# Patient Record
Sex: MALE | NOT HISPANIC OR LATINO | Employment: OTHER | URBAN - NONMETROPOLITAN AREA
[De-identification: names, ages, dates, MRNs, and addresses within clinical notes are randomized per-mention and may not be internally consistent; named-entity substitution may affect disease eponyms.]

---

## 2022-05-15 ENCOUNTER — HOSPITAL ENCOUNTER (EMERGENCY)
Facility: HOSPITAL | Age: 62
Discharge: HOME | End: 2022-05-15
Attending: EMERGENCY MEDICINE | Admitting: EMERGENCY MEDICINE
Payer: COMMERCIAL

## 2022-05-15 ENCOUNTER — APPOINTMENT (EMERGENCY)
Dept: RADIOLOGY | Facility: HOSPITAL | Age: 62
End: 2022-05-15
Payer: COMMERCIAL

## 2022-05-15 VITALS
HEIGHT: 70 IN | BODY MASS INDEX: 26.48 KG/M2 | WEIGHT: 185 LBS | SYSTOLIC BLOOD PRESSURE: 144 MMHG | DIASTOLIC BLOOD PRESSURE: 81 MMHG | HEART RATE: 57 BPM | OXYGEN SATURATION: 97 % | TEMPERATURE: 97.3 F | RESPIRATION RATE: 18 BRPM

## 2022-05-15 DIAGNOSIS — S91.332A PUNCTURE WOUND OF LEFT FOOT, INITIAL ENCOUNTER: Primary | ICD-10-CM

## 2022-05-15 PROCEDURE — 73630 X-RAY EXAM OF FOOT: CPT

## 2022-05-15 PROCEDURE — 99283 EMERGENCY DEPT VISIT LOW MDM: CPT

## 2022-05-15 PROCEDURE — 99284 EMERGENCY DEPT VISIT MOD MDM: CPT | Performed by: PHYSICIAN ASSISTANT

## 2022-05-15 PROCEDURE — 90471 IMMUNIZATION ADMIN: CPT

## 2022-05-15 RX ORDER — ACETAMINOPHEN 500 MG
2 TABLET ORAL 2 TIMES DAILY
COMMUNITY
Start: 2022-03-24

## 2022-05-15 RX ORDER — CIPROFLOXACIN 500 MG/1
500 TABLET, FILM COATED ORAL ONCE
Status: COMPLETED | OUTPATIENT
Start: 2022-05-15 | End: 2022-05-15

## 2022-05-15 RX ORDER — PRAVASTATIN SODIUM 40 MG
40 TABLET ORAL
COMMUNITY
Start: 2022-03-24

## 2022-05-15 RX ORDER — CIPROFLOXACIN 500 MG/1
500 TABLET, FILM COATED ORAL 2 TIMES DAILY
Qty: 14 TABLET | Refills: 0 | Status: SHIPPED | OUTPATIENT
Start: 2022-05-15 | End: 2022-05-22

## 2022-05-15 RX ORDER — LISINOPRIL 5 MG/1
1 TABLET ORAL EVERY MORNING
COMMUNITY
Start: 2022-03-24

## 2022-05-15 RX ORDER — ALLOPURINOL 100 MG/1
100 TABLET ORAL
COMMUNITY
Start: 2022-03-24

## 2022-05-15 RX ORDER — VENLAFAXINE HYDROCHLORIDE 37.5 MG/1
37.5 CAPSULE, EXTENDED RELEASE ORAL
COMMUNITY
Start: 2022-04-26

## 2022-05-15 RX ORDER — BACITRACIN, NEOMYCIN, POLYMYXIN B 400; 3.5; 5 [USP'U]/G; MG/G; [USP'U]/G
1 OINTMENT TOPICAL ONCE
Status: COMPLETED | OUTPATIENT
Start: 2022-05-15 | End: 2022-05-15

## 2022-05-15 RX ADMIN — BACITRACIN, NEOMYCIN, POLYMYXIN B 1 SMALL APPLICATION: 400; 3.5; 5 OINTMENT TOPICAL at 16:58

## 2022-05-15 RX ADMIN — CIPROFLOXACIN HYDROCHLORIDE 500 MG: 500 TABLET, FILM COATED ORAL at 15:58

## 2022-05-15 NOTE — ED PROVIDER NOTES
History  Chief Complaint   Patient presents with    Laceration     Pt arrives reporting he was in the barn and accidentally stepped on a nail  Pt thinks he is up to date on tetanus  26-year-old male presents emergency department for evaluation of left foot wound  Patient states prior to arrival he was walking in a barn when he stepped on a nail  Reports now went through the sole of his shoe into the foot  Tetanus up to date      History provided by:  Patient  Medical Problem  Location:  Left foot  Quality:  Puncture wound  Onset quality:  Sudden  Duration:  1 hour  Chronicity:  New  Context:  Stepped on nail  Ineffective treatments:  None tried      Prior to Admission Medications   Prescriptions Last Dose Informant Patient Reported? Taking? Cholecalciferol 25 MCG (1000 UT) tablet   Yes Yes   Sig: Take 1 tablet by mouth every other day   acetaminophen (TYLENOL) 500 mg tablet   Yes Yes   Sig: Take 2 tablets by mouth in the morning and 2 tablets in the evening  allopurinol (ZYLOPRIM) 100 mg tablet   Yes Yes   Sig: Take 100 mg by mouth   lisinopril (ZESTRIL) 5 mg tablet   Yes Yes   Sig: Take 1 tablet by mouth every morning   pravastatin (PRAVACHOL) 40 mg tablet   Yes Yes   Sig: Take 40 mg by mouth   venlafaxine (EFFEXOR-XR) 37 5 mg 24 hr capsule   Yes Yes   Sig: Take 37 5 mg by mouth      Facility-Administered Medications: None       Past Medical History:   Diagnosis Date    Hypertension        Past Surgical History:   Procedure Laterality Date    ELBOW SURGERY      REPLACEMENT TOTAL KNEE Bilateral        History reviewed  No pertinent family history  I have reviewed and agree with the history as documented  E-Cigarette/Vaping     E-Cigarette/Vaping Substances     Social History     Tobacco Use    Smoking status: Never Smoker    Smokeless tobacco: Never Used   Substance Use Topics    Alcohol use: Yes    Drug use: Never       Review of Systems   Constitutional: Negative  HENT: Negative  Respiratory: Negative  Cardiovascular: Negative  Gastrointestinal: Negative  Musculoskeletal: Negative  Skin: Positive for wound  Neurological: Negative  All other systems reviewed and are negative  Physical Exam  Physical Exam  Vitals and nursing note reviewed  Constitutional:       General: He is not in acute distress  Appearance: Normal appearance  He is normal weight  He is not ill-appearing, toxic-appearing or diaphoretic  HENT:      Head: Normocephalic  Nose: Nose normal    Eyes:      Conjunctiva/sclera: Conjunctivae normal    Pulmonary:      Effort: Pulmonary effort is normal    Musculoskeletal:      Left foot: No bony tenderness  Feet:    Skin:     General: Skin is warm and dry  Capillary Refill: Capillary refill takes less than 2 seconds  Findings: No bruising, erythema or rash  Comments: Puncture wound to the plantar aspect of the left foot  no obvious foreign body  Bleeding controlled  No gross contamination    Neurological:      General: No focal deficit present  Mental Status: He is alert and oriented to person, place, and time     Psychiatric:         Mood and Affect: Mood normal          Behavior: Behavior normal          Vital Signs  ED Triage Vitals [05/15/22 1541]   Temperature Pulse Respirations Blood Pressure SpO2   (!) 97 3 °F (36 3 °C) 57 18 144/81 97 %      Temp src Heart Rate Source Patient Position - Orthostatic VS BP Location FiO2 (%)   -- -- -- -- --      Pain Score       4           Vitals:    05/15/22 1541   BP: 144/81   Pulse: 57         Visual Acuity      ED Medications  Medications   ciprofloxacin (CIPRO) tablet 500 mg (500 mg Oral Given 5/15/22 1558)   neomycin-bacitracin-polymyxin b (NEOSPORIN) ointment 1 small application (1 small application Topical Given 5/15/22 1658)       Diagnostic Studies  Results Reviewed     None                 XR foot 3+ vw left    (Results Pending)              Procedures  Procedures ED Course  ED Course as of 05/15/22 1716   Sun May 15, 2022   1647 ED interpretation of foot x-ray negative for foreign body  Discussed all results and findings with the patient and spouse  Discussed symptomatic treatment and strict return precautions  Both are agreeable to strengthen  Patient was clinically and hemodynamically stable for discharge                               SBIRT 20yo+    Flowsheet Row Most Recent Value   SBIRT (25 yo +)    In order to provide better care to our patients, we are screening all of our patients for alcohol and drug use  Would it be okay to ask you these screening questions? No Filed at: 05/15/2022 1546                    [facility]  Number of Diagnoses or Management Options  Puncture wound of left foot, initial encounter: new and requires workup  Diagnosis management comments: 61-year-old male presenting emergency department for evaluation of puncture wound left foot  Nail went through the sole of his shoe  Vitals and medical records reviewed  Puncture wound to the plantar aspect of the left foot  No obvious foreign body  ED interpretation of x-ray negative for acute foreign body  Tetanus is up-to-date per patient  Started on antibiotics  Wound was cleaned and Neosporin and bandage applied  Appropriate follow-up was discussed and strict return precautions were discussed and patient verbalized understanding    Patient was clinically and hemodynamically stable for discharge       Amount and/or Complexity of Data Reviewed  Tests in the radiology section of CPT®: ordered and reviewed  Review and summarize past medical records: yes  Independent visualization of images, tracings, or specimens: yes        Disposition  Final diagnoses:   Puncture wound of left foot, initial encounter     Time reflects when diagnosis was documented in both MDM as applicable and the Disposition within this note     Time User Action Codes Description Comment    5/15/2022  4:38 PM Eleuterio Houser Add [[id number]] Puncture wound of left foot, initial encounter       ED Disposition     ED Disposition   Discharge    Condition   Stable    Date/Time   Sun May 15, 2022  4:38 PM    Comment   Cynthia Officer discharge to home/self care  Follow-up Information    None         Discharge Medication List as of 5/15/2022  4:41 PM      START taking these medications    Details   ciprofloxacin (CIPRO) 500 mg tablet Take 1 tablet (500 mg total) by mouth in the morning and 1 tablet (500 mg total) in the evening  Do all this for 7 days  , Starting Sun 5/15/2022, Until Sun 5/22/2022, Normal         CONTINUE these medications which have NOT CHANGED    Details   acetaminophen (TYLENOL) 500 mg tablet Take 2 tablets by mouth in the morning and 2 tablets in the evening , Starting Thu 3/24/2022, Historical Med      allopurinol (ZYLOPRIM) 100 mg tablet Take 100 mg by mouth, Starting Thu 3/24/2022, Historical Med      Cholecalciferol 25 MCG (1000 UT) tablet Take 1 tablet by mouth every other day, Starting Fri 3/25/2022, Historical Med      lisinopril (ZESTRIL) 5 mg tablet Take 1 tablet by mouth every morning, Starting Thu 3/24/2022, Historical Med      pravastatin (PRAVACHOL) 40 mg tablet Take 40 mg by mouth, Starting Thu 3/24/2022, Historical Med      venlafaxine (EFFEXOR-XR) 37 5 mg 24 hr capsule Take 37 5 mg by mouth, Starting Tue 4/26/2022, Historical Med             No discharge procedures on file      PDMP Review     None          ED Provider  Electronically Signed by           Candy Conway PA-C  05/15/22 1980

## 2022-05-15 NOTE — DISCHARGE INSTRUCTIONS
Please take antibiotics as prescribed  Please have wound rechecked in 48 hrs  Return with new or worsening symptoms

## 2022-05-15 NOTE — ED NOTES
Pt in no acute distress  Ambulates with a steady gait   Verbalizes understanding of discharge instructions       Mar Linder RN  05/15/22 0270

## 2023-06-26 RX ORDER — PREDNISONE 50 MG/1
50 TABLET ORAL
COMMUNITY
Start: 2023-06-06 | End: 2023-06-28

## 2023-06-26 RX ORDER — LIDOCAINE 50 MG/G
PATCH TOPICAL
COMMUNITY
Start: 2023-05-30 | End: 2023-06-28

## 2023-06-26 RX ORDER — PREDNISONE 20 MG/1
40 TABLET ORAL
COMMUNITY
Start: 2023-06-09 | End: 2023-06-28

## 2023-06-28 ENCOUNTER — CONSULT (OUTPATIENT)
Dept: PAIN MEDICINE | Facility: CLINIC | Age: 63
End: 2023-06-28
Payer: COMMERCIAL

## 2023-06-28 VITALS
OXYGEN SATURATION: 98 % | DIASTOLIC BLOOD PRESSURE: 80 MMHG | SYSTOLIC BLOOD PRESSURE: 110 MMHG | HEART RATE: 52 BPM | WEIGHT: 204.6 LBS | TEMPERATURE: 97.8 F | BODY MASS INDEX: 29.29 KG/M2 | HEIGHT: 70 IN

## 2023-06-28 DIAGNOSIS — M54.16 LUMBAR RADICULOPATHY: Primary | ICD-10-CM

## 2023-06-28 DIAGNOSIS — M47.816 LUMBAR SPONDYLOSIS: ICD-10-CM

## 2023-06-28 PROCEDURE — 99204 OFFICE O/P NEW MOD 45 MIN: CPT | Performed by: ANESTHESIOLOGY

## 2023-06-28 RX ORDER — GABAPENTIN 300 MG/1
300 CAPSULE ORAL 3 TIMES DAILY
Qty: 90 CAPSULE | Refills: 2 | Status: SHIPPED | OUTPATIENT
Start: 2023-06-28 | End: 2023-06-28

## 2023-06-28 RX ORDER — METHYLPREDNISOLONE 4 MG/1
TABLET ORAL
Qty: 21 TABLET | Refills: 0 | Status: SHIPPED | OUTPATIENT
Start: 2023-06-28

## 2023-06-28 NOTE — PATIENT INSTRUCTIONS
Core Strengthening Exercises   WHAT YOU NEED TO KNOW:   Your core includes the muscles of your lower back, hip, pelvis, and abdomen  Core strengthening exercises help heal and strengthen these muscles  This helps prevent another injury, and keeps your pelvis, spine, and hips in the correct position  DISCHARGE INSTRUCTIONS:   Call your doctor or physical therapist if:   You have sharp or worsening pain during exercise or at rest     You have questions or concerns about your shoulder exercises  Safety tips:  Talk to your healthcare provider before you start an exercise program  A physical therapist can teach you how to do core strengthening exercises safely  Do the exercises on a mat or firm surface  A firm surface will support your spine and prevent low back pain  Do not do these exercises on a bed  Move slowly and smoothly  Avoid fast or jerky motions  Stop if you feel pain  You may feel some discomfort at first, but you should not feel pain  Tell your provider or physical therapist if you have pain while you exercise  Regular exercise will help decrease your discomfort over time  Breathe normally during core exercises  Do not hold your breath  This may cause an increase in blood pressure and prevent muscle strengthening  Your healthcare provider will tell you when to inhale and exhale during the exercise  Begin all of your exercises with abdominal bracing  Abdominal bracing helps warm up your core muscles  You can also practice abdominal bracing throughout the day  Lie on your back with your knees bent and feet flat on the floor  Place your arms in a relaxed position beside your body  Tighten your abdominal muscles  Pull your belly button in and up toward your spine  Hold for 5 seconds  Relax your muscles  Repeat 10 times  Core strengthening exercises: Your healthcare provider will tell you how often to do these exercises   The provider will also tell you how many repetitions of each exercise you should do  Hold each exercise for 5 seconds or as directed  As you get stronger, increase your hold to 10 to 15 seconds  You can do some of these exercises on a stability ball, or with a weight  Ask your healthcare provider how to use a stability ball or weight for these exercises:  Bridging:  Lie on your back with your knees bent and feet flat on the floor  Rest your arms at your side  Tighten your buttocks, and then lift your hips 1 inch off the floor  Hold for 5 seconds  When you can do this exercise without pain for 10 seconds, increase the distance you lift your hips  A good goal is to be able to lift your hips so that your shoulders, hips, and knees are in a straight line  Dead bug:  Lie on your back with your knees bent and feet flat on the floor  Place your arms in a relaxed position beside your body  Begin with abdominal bracing  Next, raise one leg, keeping your knee bent  Hold for 5 seconds  Repeat with the other leg  When you can do this exercise without pain for 10 to 15 seconds, you may raise one straight leg and hold  Repeat with the other leg  Quadruped:  Place your hands and knees on the floor  Keep your wrists directly below your shoulders and your knees directly below your hips  Pull your belly button in toward your spine  Do not flatten or arch your back  Tighten your abdominal muscles below your belly button  Hold for 5 seconds  When you can do this exercise without pain for 10 to 15 seconds, you may extend one arm and hold  Repeat on the other side  Side bridge exercises:      Standing side bridge:  Stand next to a wall and extend one arm toward the wall  Place your palm flat on the wall with your fingers pointing upward  Begin with abdominal bracing  Next, without moving your feet, slowly bend your arm to 90 degrees  Hold for 5 seconds  Repeat on the other side   When you can do this exercise without pain for 10 to 15 seconds, you may do the bent leg side bridge on the floor  Bent leg side bridge:  Lie on one side with your legs, hips, and shoulders in a straight line  Prop yourself up onto your forearm so your elbow is directly below your shoulder  Bend your knees back to 90 degrees  Begin with abdominal bracing  Next, lift your hips and balance yourself on your forearm and knees  Hold for 5 seconds  Repeat on the other side  When you can do this exercise without pain for 10 to 15 seconds, you may do the straight leg side bridge on the floor  Straight leg side bridge:  Lie on one side with your legs, hips, and shoulders in a straight line  Prop yourself up onto your forearm so your elbow is directly below your shoulder  Begin with abdominal bracing  Lift your hips off the floor and balance yourself on your forearm and the outside of your flexed foot  Do not let your ankle bend sideways  Hold for 5 seconds  Repeat on the other side  When you can do this exercise without pain for 10 to 15 seconds, ask your healthcare provider for more advanced exercises  Superman:  Lie on your stomach  Extend your arms forward on the floor  Tighten your abdominal muscles and lift your right hand and left leg off the floor  Hold this position  Slowly return to the starting position  Tighten your abdominal muscles and lift your left hand and right leg off the floor  Hold this position  Slowly return to the starting position  Clam:  Lie on your side with your knees bent  Put your bottom arm under your head to keep your neck in line  Put your top hand on your hip to keep your pelvis from moving  Put your heels together, and keep them together during this exercise  Slowly raise your top knee toward the ceiling  Then lower your leg so your knees are together  Repeat this exercise 10 times  Then switch sides and do the exercise 10 times with the other leg  Curl up:  Lie on your back with your knees bent and feet flat on the floor   Place your hands, palms down, underneath your lower back  Next, with your elbows on the floor, lift your shoulders and chest 2 to 3 inches off the floor  Keep your head in line with your shoulders  Hold this position  Slowly return to the starting position  Straight leg raises:  Lie on your back with one leg straight  Bend the other knee and place your foot flat on the floor  Tighten your abdominal muscles  Keep your leg straight and slowly lift it straight up 6 to 12 inches off the floor  Hold this position  Lower your leg slowly  Do as many repetitions as directed on this side  Repeat with the other leg  Plank:  Lie on your stomach  Bend your elbows and place your forearms flat on the floor  Lift your chest, stomach, and knees off the floor  Make sure your elbows are below your shoulders  Your body should be in a straight line  Do not let your hips or lower back sink to the ground  Squeeze your abdominal muscles together and hold for 15 seconds  To make this exercise harder, hold for 30 seconds or lift 1 leg at a time  Bicycles:  Lie on your back  Bend both knees and bring them toward your chest  Your calves should be parallel to the floor  Place the palms of your hands on the back of your head  Straighten your right leg and keep it lifted 2 inches off the floor  Raise your head and shoulders off the floor and twist towards your left  Keep your head and shoulders lifted  Bend your right knee while you straighten your left leg  Keep your left leg 2 inches off the floor  Twist your head and chest towards the left leg  Continue to straighten 1 leg at a time and twist        Follow up with your doctor or physical therapist as directed:  Write down your questions so you remember to ask them during your visits  © Copyright [employer] Harrell 2022 Information is for End User's use only and may not be sold, redistributed or otherwise used for commercial purposes  The above information is an  only   It is not intended as medical advice for individual conditions or treatments  Talk to your doctor, nurse or pharmacist before following any medical regimen to see if it is safe and effective for you

## 2023-06-28 NOTE — PROGRESS NOTES
Assessment  1  Lumbar radiculopathy    2  Lumbar spondylosis - Bilateral    Left-sided lumbar radicular pain in the L5 dermatomal distribution accompanied by pain limited weakness numbness and paresthesias  Patient has not yet participated with PT recently but has in the past  Chronic pain with decreased participation with IADLs over the past 3 months  Has been taking OTC tylenol in addition to gabapentin and steroids with modest benefit  5/5 strength bilaterally, positive SLR left-sided  Reflexes 2+  Additionally there is positive facet loading, left greater than right  Denies any gait instability, saddle anesthesia  No imaging to review  Risks, benefits alternatives to epidural steroid injections thoroughly discussed with patient  Handouts provided questions answered to patient's satisfaction  Lifestyle modifications extensively discussed including diet, exercise and weight loss in addition to core strengthening  Will proceed with multimodal pain therapy plan as noted below:    Plan  -f/u after MRI lumbar spine noncontrast  -medrol dose pack rx  -script provided for formal physical therapy for right-sided lumbar radiculopathy; Physician directed home exercise plan as per [facility] demonstrated and handouts provided that patient plans to participate with for 1 hour, twice a week for the next 6 weeks  There are risks associated with opioid medications, including dependence, addiction and tolerance  The patient understands and agrees to use these medications only as prescribed  Potential side effects of the medications include, but are not limited to, constipation, drowsiness, addiction, impaired judgment and risk of fatal overdose if not taken as prescribed  The patient was warned against driving while taking sedation medications or operating heavy machinery  The patient voiced understanding  Sharing medications is a felony   At this point in time, the patient is showing no signs of addiction, abuse, diversion or suicidal ideation  [place] Prescription Drug Monitoring Program report was reviewed and was appropriate      Complete risks and benefits including bleeding, infection, tissue reaction, nerve injury and allergic reaction were discussed  The approach was demonstrated using models and literature was provided  Verbal and written consent was obtained  My impressions and treatment recommendations were discussed in detail with the patient who verbalized understanding and had no further questions  Discharge instructions were provided  I personally saw and examined the patient and I agree with the above discussed plan of care  New Medications Ordered This Visit   Medications   • lidocaine (LIDODERM) 5 %     Sig: Place on the skin   • predniSONE 20 mg tablet     Si mg   • predniSONE 50 mg tablet     Si mg       History of Present Illness    Dipesh Gilbert is a 70 y o  male  presenting with chronic lumbar radicular pain in the left L5 dermatomal distribution since April  Debilitating pain limited weakness numbness and paresthesias accompany the pain  The patient rates the pain at a 8/10 accompanied by electric shock-like shooting features and crampy burning pain in the aforementioned dermatomal distributions  The pain is worse in the mornings as well as the end of the day; exertion such as walking for long periods of time seems to exacerbate the pain  The patient can hardly walk more than a few blocks without having debilitating pain  He tries to maintain an active lifestyle and finds that the current degree of pain seems to compromises his efforts  The pain significantly impacts independent activities of daily living and contributes to significant disability  He has attempted physical therapy with exacerbation of the pain  He has taken naproxen, tylenol as well as gabapentin with limited relief of the pain as well    He has never tried epidural steroid injections in the past  He denies any bowel or bladder dysfunction/incontinence, saddle anesthesia or gait instability  I have personally reviewed and/or updated the patient's past medical history, past surgical history, family history, social history, current medications, allergies, and vital signs today  Review of Systems   Constitutional: Positive for activity change  HENT: Negative  Eyes: Negative  Respiratory: Negative  Cardiovascular: Negative  Gastrointestinal: Negative  Endocrine: Negative  Genitourinary: Negative  Musculoskeletal: Positive for arthralgias, back pain, gait problem and myalgias  Skin: Negative  Allergic/Immunologic: Negative  Neurological: Positive for weakness and numbness  Hematological: Negative  Psychiatric/Behavioral: Negative  All other systems reviewed and are negative  Patient Active Problem List   Diagnosis   • Lumbar spondylosis - Bilateral   • Lumbar radiculopathy       Past Medical History:   Diagnosis Date   • Hypertension        Past Surgical History:   Procedure Laterality Date   • ELBOW SURGERY     • REPLACEMENT TOTAL KNEE Bilateral        History reviewed  No pertinent family history  Social History     Occupational History   • Not on file   Tobacco Use   • Smoking status: Never   • Smokeless tobacco: Never   Substance and Sexual Activity   • Alcohol use: Yes   • Drug use: Never   • Sexual activity: Not on file       Current Outpatient Medications on File Prior to Visit   Medication Sig   • acetaminophen (TYLENOL) 500 mg tablet Take 2 tablets by mouth in the morning and 2 tablets in the evening     • allopurinol (ZYLOPRIM) 100 mg tablet Take 100 mg by mouth   • Cholecalciferol 25 MCG (1000 UT) tablet Take 1 tablet by mouth every other day   • lisinopril (ZESTRIL) 5 mg tablet Take 1 tablet by mouth every morning   • venlafaxine (EFFEXOR-XR) 37 5 mg 24 hr capsule Take 37 5 mg by mouth   • lidocaine (LIDODERM) 5 % Place on the skin (Patient not taking: "Reported on 6/28/2023)   • pravastatin (PRAVACHOL) 40 mg tablet Take 40 mg by mouth (Patient not taking: Reported on 6/28/2023)   • predniSONE 20 mg tablet 40 mg (Patient not taking: Reported on 6/28/2023)   • predniSONE 50 mg tablet 50 mg (Patient not taking: Reported on 6/28/2023)     No current facility-administered medications on file prior to visit  Allergies   Allergen Reactions   • Ibuprofen Other (See Comments)         Physical Exam    /80   Pulse (!) 52   Temp 97 8 °F (36 6 °C)   Ht 5' 10\" (1 778 m)   Wt 92 8 kg (204 lb 9 6 oz)   SpO2 98%   BMI 29 36 kg/m²     Constitutional: normal, well developed, well nourished, alert, in no distress and non-toxic and no overt pain behavior  and overweight  Eyes: anicteric  HEENT: grossly intact  Neck: supple, symmetric, trachea midline and no masses   Pulmonary:even and unlabored  Cardiovascular:No edema or pitting edema present  Skin:Normal without rashes or lesions and well hydrated  Psychiatric:Mood and affect appropriate  Neurologic:Cranial Nerves II-XII grossly intact Sensation grossly intact; no clonus negative anders's  Reflexes 2+ and brisk  SLR positive left sided  Musculoskeletal:normal gait  5/5 strength bilaterally with AROM in all extremities  Difficulty with normal heel toe and tip toe walking  Significant pain with lumbar facet loading bilaterally and with lateral spine rotation, left greater than right  ttp over lumbar paraspinal muscles  Negative basilio's test, negative gaenslen's negative SIJ loading bilaterally      Imaging    No pertinent imaging to review at this time      "

## 2023-06-28 NOTE — H&P (VIEW-ONLY)
Assessment  1. Lumbar radiculopathy    2. Lumbar spondylosis - Bilateral    Left-sided lumbar radicular pain in the L5 dermatomal distribution accompanied by pain limited weakness numbness and paresthesias. Patient has not yet participated with PT recently but has in the past. Chronic pain with decreased participation with IADLs over the past 3 months. Has been taking OTC tylenol in addition to gabapentin and steroids with modest benefit. 5/5 strength bilaterally, positive SLR left-sided. Reflexes 2+. Additionally there is positive facet loading, left greater than right. Denies any gait instability, saddle anesthesia. No imaging to review. Risks, benefits alternatives to epidural steroid injections thoroughly discussed with patient. Handouts provided questions answered to patient's satisfaction. Lifestyle modifications extensively discussed including diet, exercise and weight loss in addition to core strengthening. Will proceed with multimodal pain therapy plan as noted below:    Plan  -f/u after MRI lumbar spine noncontrast  -medrol dose pack rx  -script provided for formal physical therapy for right-sided lumbar radiculopathy; Physician directed home exercise plan as per [facility] demonstrated and handouts provided that patient plans to participate with for 1 hour, twice a week for the next 6 weeks. There are risks associated with opioid medications, including dependence, addiction and tolerance. The patient understands and agrees to use these medications only as prescribed. Potential side effects of the medications include, but are not limited to, constipation, drowsiness, addiction, impaired judgment and risk of fatal overdose if not taken as prescribed. The patient was warned against driving while taking sedation medications or operating heavy machinery. The patient voiced understanding. Sharing medications is a felony.  At this point in time, the patient is showing no signs of addiction, abuse, diversion or suicidal ideation. [place] Prescription Drug Monitoring Program report was reviewed and was appropriate      Complete risks and benefits including bleeding, infection, tissue reaction, nerve injury and allergic reaction were discussed. The approach was demonstrated using models and literature was provided. Verbal and written consent was obtained. My impressions and treatment recommendations were discussed in detail with the patient who verbalized understanding and had no further questions. Discharge instructions were provided. I personally saw and examined the patient and I agree with the above discussed plan of care. New Medications Ordered This Visit   Medications   • lidocaine (LIDODERM) 5 %     Sig: Place on the skin   • predniSONE 20 mg tablet     Si mg   • predniSONE 50 mg tablet     Si mg       History of Present Illness    Hildreth Dandy is a 70 y.o. male  presenting with chronic lumbar radicular pain in the left L5 dermatomal distribution since April. Debilitating pain limited weakness numbness and paresthesias accompany the pain. The patient rates the pain at a 8/10 accompanied by electric shock-like shooting features and crampy burning pain in the aforementioned dermatomal distributions. The pain is worse in the mornings as well as the end of the day; exertion such as walking for long periods of time seems to exacerbate the pain. The patient can hardly walk more than a few blocks without having debilitating pain. He tries to maintain an active lifestyle and finds that the current degree of pain seems to compromises his efforts. The pain significantly impacts independent activities of daily living and contributes to significant disability. He has attempted physical therapy with exacerbation of the pain. He has taken naproxen, tylenol as well as gabapentin with limited relief of the pain as well.   He has never tried epidural steroid injections in the past. He denies any bowel or bladder dysfunction/incontinence, saddle anesthesia or gait instability. I have personally reviewed and/or updated the patient's past medical history, past surgical history, family history, social history, current medications, allergies, and vital signs today. Review of Systems   Constitutional: Positive for activity change. HENT: Negative. Eyes: Negative. Respiratory: Negative. Cardiovascular: Negative. Gastrointestinal: Negative. Endocrine: Negative. Genitourinary: Negative. Musculoskeletal: Positive for arthralgias, back pain, gait problem and myalgias. Skin: Negative. Allergic/Immunologic: Negative. Neurological: Positive for weakness and numbness. Hematological: Negative. Psychiatric/Behavioral: Negative. All other systems reviewed and are negative. Patient Active Problem List   Diagnosis   • Lumbar spondylosis - Bilateral   • Lumbar radiculopathy       Past Medical History:   Diagnosis Date   • Hypertension        Past Surgical History:   Procedure Laterality Date   • ELBOW SURGERY     • REPLACEMENT TOTAL KNEE Bilateral        History reviewed. No pertinent family history. Social History     Occupational History   • Not on file   Tobacco Use   • Smoking status: Never   • Smokeless tobacco: Never   Substance and Sexual Activity   • Alcohol use: Yes   • Drug use: Never   • Sexual activity: Not on file       Current Outpatient Medications on File Prior to Visit   Medication Sig   • acetaminophen (TYLENOL) 500 mg tablet Take 2 tablets by mouth in the morning and 2 tablets in the evening.    • allopurinol (ZYLOPRIM) 100 mg tablet Take 100 mg by mouth   • Cholecalciferol 25 MCG (1000 UT) tablet Take 1 tablet by mouth every other day   • lisinopril (ZESTRIL) 5 mg tablet Take 1 tablet by mouth every morning   • venlafaxine (EFFEXOR-XR) 37.5 mg 24 hr capsule Take 37.5 mg by mouth   • lidocaine (LIDODERM) 5 % Place on the skin (Patient not taking: Reported on 6/28/2023)   • pravastatin (PRAVACHOL) 40 mg tablet Take 40 mg by mouth (Patient not taking: Reported on 6/28/2023)   • predniSONE 20 mg tablet 40 mg (Patient not taking: Reported on 6/28/2023)   • predniSONE 50 mg tablet 50 mg (Patient not taking: Reported on 6/28/2023)     No current facility-administered medications on file prior to visit. Allergies   Allergen Reactions   • Ibuprofen Other (See Comments)         Physical Exam    /80   Pulse (!) 52   Temp 97.8 °F (36.6 °C)   Ht 5' 10" (1.778 m)   Wt 92.8 kg (204 lb 9.6 oz)   SpO2 98%   BMI 29.36 kg/m²     Constitutional: normal, well developed, well nourished, alert, in no distress and non-toxic and no overt pain behavior. and overweight  Eyes: anicteric  HEENT: grossly intact  Neck: supple, symmetric, trachea midline and no masses   Pulmonary:even and unlabored  Cardiovascular:No edema or pitting edema present  Skin:Normal without rashes or lesions and well hydrated  Psychiatric:Mood and affect appropriate  Neurologic:Cranial Nerves II-XII grossly intact Sensation grossly intact; no clonus negative anders's. Reflexes 2+ and brisk. SLR positive left sided. Musculoskeletal:normal gait. 5/5 strength bilaterally with AROM in all extremities. Difficulty with normal heel toe and tip toe walking. Significant pain with lumbar facet loading bilaterally and with lateral spine rotation, left greater than right. ttp over lumbar paraspinal muscles. Negative basilio's test, negative gaenslen's negative SIJ loading bilaterally.     Imaging    No pertinent imaging to review at this time

## 2023-07-20 ENCOUNTER — TELEPHONE (OUTPATIENT)
Dept: PAIN MEDICINE | Facility: CLINIC | Age: 63
End: 2023-07-20

## 2023-07-20 ENCOUNTER — PREP FOR PROCEDURE (OUTPATIENT)
Dept: PAIN MEDICINE | Facility: CLINIC | Age: 63
End: 2023-07-20

## 2023-07-20 DIAGNOSIS — M54.16 LUMBAR RADICULOPATHY: Primary | ICD-10-CM

## 2023-07-20 NOTE — TELEPHONE ENCOUNTER
RN attempted to reach pt regarding previous. [facility] with c/b number office hours and location provided. Pt is on the schedule for a procedure on 7/27.

## 2023-07-20 NOTE — TELEPHONE ENCOUNTER
.Caller: pt    Doctor: Godwin Isabel    Reason for call: Pt is calling in asking to schedule for a procedure. uhe is still in a lot of pain. [facility]      Call back#: [phone]

## 2023-07-24 NOTE — TELEPHONE ENCOUNTER
Attempted to reach patient [facility] with call back number & office hours provided.    Pt scheduled for PRO: L5-S1 ILESI on 7/27 with VS.

## 2023-07-24 NOTE — TELEPHONE ENCOUNTER
S/W pt. Advised Pt he is scheduled for PRO: L5-S1 ILESI on 7/27 with VS. Pt verbalized understanding & was appreciative for the clarification.

## 2023-07-26 NOTE — DISCHARGE INSTR - AVS FIRST PAGE
YOUR 2 WEEK FOLLOW UP HAS BEEN SCHEDULED; IF YOU WISH TO CHANGE THE FOLLOW UP, PLEASE CALL THE [facility] AT [place]: [phone]    Epidural Steroid Injection   WHAT YOU NEED TO KNOW:   An epidural steroid injection (LUIS) is a procedure to inject steroid medicine into the epidural space. The epidural space is between your spinal cord and vertebrae. Steroids reduce inflammation and fluid buildup in your spine that may be causing pain. You may be given pain medicine along with the steroids. DISCHARGE INSTRUCTIONS:   Call your local emergency number (911 in the [address]) if:   You have a seizure. You have trouble moving your legs. Seek care immediately if:   Blood soaks through your bandage. You have a fever or chills, severe back pain, and the procedure area is sensitive to the touch. You cannot control when you urinate or have a bowel movement. Call your doctor if:   You have weakness or numbness in your legs. Your wound is red, swollen, or draining pus. You have nausea or are vomiting. Your face or neck is red and you feel warm. You have more pain than you had before the procedure. You have swelling in your hands or feet. You have questions or concerns about your condition or care. Care for your wound as directed: You may remove the bandage before you go to bed the day of your procedure. You may take a shower, but do not take a bath for at least 24 hours. Self-care:   Do not drive,  use machines, or do strenuous activity for 24 hours after your procedure or as directed. Continue other treatments  as directed. Steroid injections alone will not control your pain. The injections are meant to be used with other treatments, such as physical therapy. Follow up with your doctor as directed:  Write down your questions so you remember to ask them during your visits.    © Copyright [employer]  Information is for End User's use only and may not be sold, redistributed or otherwise used for commercial purposes. The above information is an  only. It is not intended as medical advice for individual conditions or treatments. Talk to your doctor, nurse or pharmacist before following any medical regimen to see if it is safe and effective for you.

## 2023-07-27 ENCOUNTER — APPOINTMENT (OUTPATIENT)
Dept: RADIOLOGY | Facility: HOSPITAL | Age: 63
End: 2023-07-27
Payer: COMMERCIAL

## 2023-07-27 ENCOUNTER — HOSPITAL ENCOUNTER (OUTPATIENT)
Facility: HOSPITAL | Age: 63
Setting detail: OUTPATIENT SURGERY
Discharge: HOME | End: 2023-07-27
Attending: ANESTHESIOLOGY | Admitting: ANESTHESIOLOGY
Payer: COMMERCIAL

## 2023-07-27 VITALS
DIASTOLIC BLOOD PRESSURE: 83 MMHG | WEIGHT: 204 LBS | OXYGEN SATURATION: 96 % | RESPIRATION RATE: 18 BRPM | TEMPERATURE: 97.9 F | HEIGHT: 70 IN | HEART RATE: 50 BPM | BODY MASS INDEX: 29.2 KG/M2 | SYSTOLIC BLOOD PRESSURE: 141 MMHG

## 2023-07-27 PROCEDURE — 62323 NJX INTERLAMINAR LMBR/SAC: CPT | Performed by: ANESTHESIOLOGY

## 2023-07-27 RX ORDER — LIDOCAINE HYDROCHLORIDE 10 MG/ML
INJECTION, SOLUTION EPIDURAL; INFILTRATION; INTRACAUDAL; PERINEURAL AS NEEDED
Status: DISCONTINUED | OUTPATIENT
Start: 2023-07-27 | End: 2023-07-27 | Stop reason: HOSPADM

## 2023-07-27 RX ORDER — SODIUM CHLORIDE 9 MG/ML
INJECTION INTRAVENOUS AS NEEDED
Status: DISCONTINUED | OUTPATIENT
Start: 2023-07-27 | End: 2023-07-27 | Stop reason: HOSPADM

## 2023-07-27 RX ORDER — METHYLPREDNISOLONE ACETATE 80 MG/ML
INJECTION, SUSPENSION INTRA-ARTICULAR; INTRALESIONAL; INTRAMUSCULAR; SOFT TISSUE AS NEEDED
Status: DISCONTINUED | OUTPATIENT
Start: 2023-07-27 | End: 2023-07-27 | Stop reason: HOSPADM

## 2023-07-27 NOTE — PROCEDURES
Pre-procedure Diagnosis:       Lumbar Radiculopathy  Post-procedure Diagnosis:     Lumbar Radiculopathy  Procedure Title(s):                  1. [L5-S1] interlaminar epidural steroid injection                                                  2. Intraoperative fluoroscopy  Attending Surgeon:                 Bri Flores MD  Anesthesia:                             Local      Indications: The patient is a  70y.o. year-old male  with a diagnosis of Lumbar Radiculopathy. The patient's history and physical exam were reviewed. The risks, benefits and alternatives to the procedure were discussed, and all questions were answered to the patient's satisfaction. The patient agreed to proceed, and written informed consent was obtained. Procedure in Detail: The patient was brought into the procedure room and placed in the prone position on the fluoroscopy table. The area of the lumbar spine was prepped with chlorhexidine gluconate solution times one and draped in a sterile manner. The [L5-S1] interspace was identified and marked under AP fluoroscopy. The skin and subcutaneous tissues in the area were anesthetized with 1% lidocaine. A 18-gauge Tuohy epidural needle was directed toward the interspace under fluoroscopic guidance until the ligamentum flavum was engaged. From this point, a loss of resistance technique with saline was used to identify entrance of the needle into the epidural space. Once an appropriate loss was obtained, negative aspiration was confirmed, and 1 ml Omnipaque 240 contrast solution was injected. An appropriate epidurogram was noted. Then, after negative aspiration, a solution consisting of 1-mL depo-medrol (80mg/mL) and 3-mL preservative-free saline was easily injected. The needle was removed with a 1% lidocaine flush. The patient's back was cleaned and a bandage was placed over the site of needle insertion.      Disposition: The patient tolerated the procedure well, and there were no apparent complications. The patient was taken to the recovery area where written discharge instructions for the procedure were given.       Estimated Blood Loss: None  Specimens Obtained: N/A

## 2023-07-27 NOTE — OP NOTE
OPERATIVE REPORT  PATIENT NAME: Obed Cole    :  1960  MRN: [id number]  Pt Location:  GI ROOM 01    SURGERY DATE: 2023    Surgeon(s) and Role: Kenna Snow MD - Primary    Preop Diagnosis:  Lumbar radiculopathy [M54.16]    Post-Op Diagnosis Codes:     * Lumbar radiculopathy [M54.16]    Procedure(s):  L5-S1 ILESI    Specimen(s):  * No specimens in log *    Estimated Blood Loss:   Minimal    Drains:  * No LDAs found *    Anesthesia Type:   Local    Operative Indications:  Lumbar radiculopathy [M54.16]    Operative Findings:  L5-S1 epidurogram    Complications:   None    Procedure and Technique:  Please see detailed procedure note    I was present for the entire procedure.     Patient Disposition:  PACU     SIGNATURE: Kenna Snow MD  DATE: 2023  TIME: 12:36 PM

## 2023-07-27 NOTE — INTERVAL H&P NOTE
H&P reviewed. After examining the patient I find no changes in the patients condition since the H&P had been written.     Vitals:    07/27/23 1214   BP: 128/81   Pulse: 56   Resp: 20   Temp: 97.7 °F (36.5 °C)   SpO2: 96%

## 2023-08-16 ENCOUNTER — PREP FOR PROCEDURE (OUTPATIENT)
Dept: PAIN MEDICINE | Facility: CLINIC | Age: 63
End: 2023-08-16

## 2023-08-16 DIAGNOSIS — M54.16 LUMBAR RADICULOPATHY: Primary | ICD-10-CM

## 2023-08-16 DIAGNOSIS — M47.816 LUMBAR SPONDYLOSIS: ICD-10-CM

## 2023-08-21 ENCOUNTER — OFFICE VISIT (OUTPATIENT)
Dept: PAIN MEDICINE | Facility: CLINIC | Age: 63
End: 2023-08-21
Payer: COMMERCIAL

## 2023-08-21 VITALS
SYSTOLIC BLOOD PRESSURE: 120 MMHG | HEART RATE: 61 BPM | WEIGHT: 208.4 LBS | DIASTOLIC BLOOD PRESSURE: 72 MMHG | BODY MASS INDEX: 29.84 KG/M2 | HEIGHT: 70 IN | OXYGEN SATURATION: 96 % | TEMPERATURE: 97.2 F

## 2023-08-21 DIAGNOSIS — M54.16 LUMBAR RADICULOPATHY: Primary | ICD-10-CM

## 2023-08-21 DIAGNOSIS — M47.816 LUMBAR SPONDYLOSIS: ICD-10-CM

## 2023-08-21 PROCEDURE — 99214 OFFICE O/P EST MOD 30 MIN: CPT | Performed by: ANESTHESIOLOGY

## 2023-08-21 RX ORDER — PREGABALIN 50 MG/1
50 CAPSULE ORAL 3 TIMES DAILY
Qty: 90 CAPSULE | Refills: 2 | Status: SHIPPED | OUTPATIENT
Start: 2023-08-21

## 2023-08-21 RX ORDER — LIDOCAINE 50 MG/G
1 PATCH TOPICAL DAILY
COMMUNITY

## 2023-08-21 NOTE — PATIENT INSTRUCTIONS
Core Strengthening Exercises   WHAT YOU NEED TO KNOW:   Your core includes the muscles of your lower back, hip, pelvis, and abdomen. Core strengthening exercises help heal and strengthen these muscles. This helps prevent another injury, and keeps your pelvis, spine, and hips in the correct position. DISCHARGE INSTRUCTIONS:   Call your doctor or physical therapist if:   You have sharp or worsening pain during exercise or at rest.    You have questions or concerns about your shoulder exercises. Safety tips:  Talk to your healthcare provider before you start an exercise program. A physical therapist can teach you how to do core strengthening exercises safely. Do the exercises on a mat or firm surface. A firm surface will support your spine and prevent low back pain. Do not do these exercises on a bed. Move slowly and smoothly. Avoid fast or jerky motions. Stop if you feel pain. You may feel some discomfort at first, but you should not feel pain. Tell your provider or physical therapist if you have pain while you exercise. Regular exercise will help decrease your discomfort over time. Breathe normally during core exercises. Do not hold your breath. This may cause an increase in blood pressure and prevent muscle strengthening. Your healthcare provider will tell you when to inhale and exhale during the exercise. Begin all of your exercises with abdominal bracing. Abdominal bracing helps warm up your core muscles. You can also practice abdominal bracing throughout the day. Lie on your back with your knees bent and feet flat on the floor. Place your arms in a relaxed position beside your body. Tighten your abdominal muscles. Pull your belly button in and up toward your spine. Hold for 5 seconds. Relax your muscles. Repeat 10 times. Core strengthening exercises: Your healthcare provider will tell you how often to do these exercises.  The provider will also tell you how many repetitions of each exercise you should do. Hold each exercise for 5 seconds or as directed. As you get stronger, increase your hold to 10 to 15 seconds. You can do some of these exercises on a stability ball, or with a weight. Ask your healthcare provider how to use a stability ball or weight for these exercises:  Bridging:  Lie on your back with your knees bent and feet flat on the floor. Rest your arms at your side. Tighten your buttocks, and then lift your hips 1 inch off the floor. Hold for 5 seconds. When you can do this exercise without pain for 10 seconds, increase the distance you lift your hips. A good goal is to be able to lift your hips so that your shoulders, hips, and knees are in a straight line. Dead bug:  Lie on your back with your knees bent and feet flat on the floor. Place your arms in a relaxed position beside your body. Begin with abdominal bracing. Next, raise one leg, keeping your knee bent. Hold for 5 seconds. Repeat with the other leg. When you can do this exercise without pain for 10 to 15 seconds, you may raise one straight leg and hold. Repeat with the other leg. Quadruped:  Place your hands and knees on the floor. Keep your wrists directly below your shoulders and your knees directly below your hips. Pull your belly button in toward your spine. Do not flatten or arch your back. Tighten your abdominal muscles below your belly button. Hold for 5 seconds. When you can do this exercise without pain for 10 to 15 seconds, you may extend one arm and hold. Repeat on the other side. Side bridge exercises:      Standing side bridge:  Stand next to a wall and extend one arm toward the wall. Place your palm flat on the wall with your fingers pointing upward. Begin with abdominal bracing. Next, without moving your feet, slowly bend your arm to 90 degrees. Hold for 5 seconds. Repeat on the other side.  When you can do this exercise without pain for 10 to 15 seconds, you may do the bent leg side bridge on the floor. Bent leg side bridge:  Lie on one side with your legs, hips, and shoulders in a straight line. Prop yourself up onto your forearm so your elbow is directly below your shoulder. Bend your knees back to 90 degrees. Begin with abdominal bracing. Next, lift your hips and balance yourself on your forearm and knees. Hold for 5 seconds. Repeat on the other side. When you can do this exercise without pain for 10 to 15 seconds, you may do the straight leg side bridge on the floor. Straight leg side bridge:  Lie on one side with your legs, hips, and shoulders in a straight line. Prop yourself up onto your forearm so your elbow is directly below your shoulder. Begin with abdominal bracing. Lift your hips off the floor and balance yourself on your forearm and the outside of your flexed foot. Do not let your ankle bend sideways. Hold for 5 seconds. Repeat on the other side. When you can do this exercise without pain for 10 to 15 seconds, ask your healthcare provider for more advanced exercises. Superman:  Lie on your stomach. Extend your arms forward on the floor. Tighten your abdominal muscles and lift your right hand and left leg off the floor. Hold this position. Slowly return to the starting position. Tighten your abdominal muscles and lift your left hand and right leg off the floor. Hold this position. Slowly return to the starting position. Clam:  Lie on your side with your knees bent. Put your bottom arm under your head to keep your neck in line. Put your top hand on your hip to keep your pelvis from moving. Put your heels together, and keep them together during this exercise. Slowly raise your top knee toward the ceiling. Then lower your leg so your knees are together. Repeat this exercise 10 times. Then switch sides and do the exercise 10 times with the other leg. Curl up:  Lie on your back with your knees bent and feet flat on the floor.  Place your hands, palms down, underneath your lower back. Next, with your elbows on the floor, lift your shoulders and chest 2 to 3 inches off the floor. Keep your head in line with your shoulders. Hold this position. Slowly return to the starting position. Straight leg raises:  Lie on your back with one leg straight. Bend the other knee and place your foot flat on the floor. Tighten your abdominal muscles. Keep your leg straight and slowly lift it straight up 6 to 12 inches off the floor. Hold this position. Lower your leg slowly. Do as many repetitions as directed on this side. Repeat with the other leg. Plank:  Lie on your stomach. Bend your elbows and place your forearms flat on the floor. Lift your chest, stomach, and knees off the floor. Make sure your elbows are below your shoulders. Your body should be in a straight line. Do not let your hips or lower back sink to the ground. Squeeze your abdominal muscles together and hold for 15 seconds. To make this exercise harder, hold for 30 seconds or lift 1 leg at a time. Bicycles:  Lie on your back. Bend both knees and bring them toward your chest. Your calves should be parallel to the floor. Place the palms of your hands on the back of your head. Straighten your right leg and keep it lifted 2 inches off the floor. Raise your head and shoulders off the floor and twist towards your left. Keep your head and shoulders lifted. Bend your right knee while you straighten your left leg. Keep your left leg 2 inches off the floor. Twist your head and chest towards the left leg. Continue to straighten 1 leg at a time and twist.       Follow up with your doctor or physical therapist as directed:  Write down your questions so you remember to ask them during your visits. © Copyright [facility] 2022 Information is for End User's use only and may not be sold, redistributed or otherwise used for commercial purposes. The above information is an  only.  It is not intended as medical advice for individual conditions or treatments. Talk to your doctor, nurse or pharmacist before following any medical regimen to see if it is safe and effective for you.

## 2023-08-21 NOTE — PROGRESS NOTES
Assessment  1. Lumbar radiculopathy - Left  -     pregabalin (LYRICA) 50 mg capsule; Take 1 capsule (50 mg total) by mouth 3 (three) times a day    2. Lumbar spondylosis - Bilateral  -     pregabalin (LYRICA) 50 mg capsule; Take 1 capsule (50 mg total) by mouth 3 (three) times a day    No significant relief of pain or improved ability to participate with IADLs after ILESI at L5-S1. Previously reported the following symptomatology:     Left-sided lumbar radicular pain in the L5 dermatomal distribution accompanied by pain limited weakness numbness and paresthesias. Patient has not yet participated with PT recently but has in the past. Chronic pain with decreased participation with IADLs over the past 3 months. Has been taking OTC tylenol in addition to gabapentin and steroids with modest benefit. 5/5 strength bilaterally, positive SLR left-sided. Reflexes 2+. Additionally there is positive facet loading, left greater than right. Denies any gait instability, saddle anesthesia. No imaging to review. Risks, benefits alternatives to epidural steroid injections thoroughly discussed with patient. Handouts provided questions answered to patient's satisfaction. Lifestyle modifications extensively discussed including diet, exercise and weight loss in addition to core strengthening. Will proceed with multimodal pain therapy plan as noted below:    Plan  -Left L5 and S1 TFESI; f/u 2 weeks post procedure  -lyrica 50 mg t.i.d. Ordered for patient; counseled regarding sedative effects of taking this medication and provided up titration calendar. Counseled not to take medication while driving or operating heavy machinery/using stairs  -script provided for formal physical therapy for left-sided lumbar radiculopathy; Physician directed home exercise plan as per [facility] demonstrated and handouts provided that patient plans to participate with for 1 hour, twice a week for the next 6 weeks.      There are risks associated with opioid medications, including dependence, addiction and tolerance. The patient understands and agrees to use these medications only as prescribed. Potential side effects of the medications include, but are not limited to, constipation, drowsiness, addiction, impaired judgment and risk of fatal overdose if not taken as prescribed. The patient was warned against driving while taking sedation medications or operating heavy machinery. The patient voiced understanding. Sharing medications is a felony. At this point in time, the patient is showing no signs of addiction, abuse, diversion or suicidal ideation. [place] Prescription Drug Monitoring Program report was reviewed and was appropriate      Complete risks and benefits including bleeding, infection, tissue reaction, nerve injury and allergic reaction were discussed. The approach was demonstrated using models and literature was provided. Verbal and written consent was obtained. My impressions and treatment recommendations were discussed in detail with the patient who verbalized understanding and had no further questions. Discharge instructions were provided. I personally saw and examined the patient and I agree with the above discussed plan of care. New Medications Ordered This Visit   Medications   • lidocaine (LIDODERM) 5 %     Sig: Apply 1 patch topically daily Remove & Discard patch within 12 hours or as directed by MD       History of Present Illness    No significant relief of pain or improved ability to participate with IADLs after ILESI at L5-S1. Previously reported the following symptomatology:     Crescencio Carreon is a 70 y.o. male  presenting with chronic lumbar radicular pain in the left L5 dermatomal distribution since April. Debilitating pain limited weakness numbness and paresthesias accompany the pain.  The patient rates the pain at a 8/10 accompanied by electric shock-like shooting features and crampy burning pain in the aforementioned dermatomal distributions. The pain is worse in the mornings as well as the end of the day; exertion such as walking for long periods of time seems to exacerbate the pain. The patient can hardly walk more than a few blocks without having debilitating pain. He tries to maintain an active lifestyle and finds that the current degree of pain seems to compromises his efforts. The pain significantly impacts independent activities of daily living and contributes to significant disability. He has attempted physical therapy with exacerbation of the pain. He has taken naproxen, tylenol as well as gabapentin with limited relief of the pain as well. He has never tried epidural steroid injections in the past. He denies any bowel or bladder dysfunction/incontinence, saddle anesthesia or gait instability. I have personally reviewed and/or updated the patient's past medical history, past surgical history, family history, social history, current medications, allergies, and vital signs today. Review of Systems   Constitutional: Positive for activity change. HENT: Negative. Eyes: Negative. Respiratory: Negative. Cardiovascular: Negative. Gastrointestinal: Negative. Endocrine: Negative. Genitourinary: Negative. Musculoskeletal: Positive for arthralgias, back pain, gait problem and myalgias. Skin: Negative. Allergic/Immunologic: Negative. Neurological: Positive for weakness and numbness. Hematological: Negative. Psychiatric/Behavioral: Negative. All other systems reviewed and are negative.       Patient Active Problem List   Diagnosis   • Lumbar spondylosis - Bilateral   • Lumbar radiculopathy       Past Medical History:   Diagnosis Date   • Hypertension        Past Surgical History:   Procedure Laterality Date   • ELBOW SURGERY     • EPIDURAL BLOCK INJECTION N/A 7/27/2023    Procedure: L5-S1 ILESI;  Surgeon: Pili Wright MD;  Location: [facility];  Service: Pain Management    • REPLACEMENT TOTAL KNEE Bilateral        History reviewed. No pertinent family history. Social History     Occupational History   • Not on file   Tobacco Use   • Smoking status: Never   • Smokeless tobacco: Never   Substance and Sexual Activity   • Alcohol use: Yes   • Drug use: Never   • Sexual activity: Not on file       Current Outpatient Medications on File Prior to Visit   Medication Sig   • acetaminophen (TYLENOL) 500 mg tablet Take 2 tablets by mouth in the morning and 2 tablets in the evening. • Cholecalciferol 25 MCG (1000 UT) tablet Take 1 tablet by mouth every other day   • lidocaine (LIDODERM) 5 % Apply 1 patch topically daily Remove & Discard patch within 12 hours or as directed by MD   • lisinopril (ZESTRIL) 5 mg tablet Take 1 tablet by mouth every morning   • pravastatin (PRAVACHOL) 40 mg tablet Take 40 mg by mouth   • venlafaxine (EFFEXOR-XR) 37.5 mg 24 hr capsule Take 37.5 mg by mouth   • allopurinol (ZYLOPRIM) 100 mg tablet Take 100 mg by mouth (Patient not taking: Reported on 8/21/2023)   • methylPREDNISolone 4 MG tablet therapy pack Use as directed on package (Patient not taking: Reported on 8/21/2023)     No current facility-administered medications on file prior to visit. Allergies   Allergen Reactions   • Ibuprofen Other (See Comments)         Physical Exam    /72 (BP Location: Left arm, Patient Position: Sitting, Cuff Size: Adult)   Pulse 61   Temp (!) 97.2 °F (36.2 °C)   Ht 5' 10" (1.778 m)   Wt 94.5 kg (208 lb 6.4 oz)   SpO2 96%   BMI 29.90 kg/m²     Constitutional: normal, well developed, well nourished, alert, in no distress and non-toxic and no overt pain behavior.  and overweight  Eyes: anicteric  HEENT: grossly intact  Neck: supple, symmetric, trachea midline and no masses   Pulmonary:even and unlabored  Cardiovascular:No edema or pitting edema present  Skin:Normal without rashes or lesions and well hydrated  Psychiatric:Mood and affect appropriate  Neurologic:Cranial Nerves II-XII grossly intact Sensation grossly intact; no clonus negative anders's. Reflexes 2+ and brisk. SLR positive left sided. Musculoskeletal:normal gait. 5/5 strength bilaterally with AROM in all extremities. Difficulty with normal heel toe and tip toe walking. Significant pain with lumbar facet loading bilaterally and with lateral spine rotation, left greater than right. ttp over lumbar paraspinal muscles. Negative basilio's test, negative gaenslen's negative SIJ loading bilaterally.     Imaging    No pertinent imaging to review at this time

## 2023-08-21 NOTE — H&P (VIEW-ONLY)
Assessment  1. Lumbar radiculopathy - Left  -     pregabalin (LYRICA) 50 mg capsule; Take 1 capsule (50 mg total) by mouth 3 (three) times a day    2. Lumbar spondylosis - Bilateral  -     pregabalin (LYRICA) 50 mg capsule; Take 1 capsule (50 mg total) by mouth 3 (three) times a day    No significant relief of pain or improved ability to participate with IADLs after ILESI at L5-S1. Previously reported the following symptomatology:     Left-sided lumbar radicular pain in the L5 dermatomal distribution accompanied by pain limited weakness numbness and paresthesias. Patient has not yet participated with PT recently but has in the past. Chronic pain with decreased participation with IADLs over the past 3 months. Has been taking OTC tylenol in addition to gabapentin and steroids with modest benefit. 5/5 strength bilaterally, positive SLR left-sided. Reflexes 2+. Additionally there is positive facet loading, left greater than right. Denies any gait instability, saddle anesthesia. No imaging to review. Risks, benefits alternatives to epidural steroid injections thoroughly discussed with patient. Handouts provided questions answered to patient's satisfaction. Lifestyle modifications extensively discussed including diet, exercise and weight loss in addition to core strengthening. Will proceed with multimodal pain therapy plan as noted below:    Plan  -Left L5 and S1 TFESI; f/u 2 weeks post procedure  -lyrica 50 mg t.i.d. Ordered for patient; counseled regarding sedative effects of taking this medication and provided up titration calendar. Counseled not to take medication while driving or operating heavy machinery/using stairs  -script provided for formal physical therapy for left-sided lumbar radiculopathy; Physician directed home exercise plan as per AAOS demonstrated and handouts provided that patient plans to participate with for 1 hour, twice a week for the next 6 weeks.      There are risks associated with opioid medications, including dependence, addiction and tolerance. The patient understands and agrees to use these medications only as prescribed. Potential side effects of the medications include, but are not limited to, constipation, drowsiness, addiction, impaired judgment and risk of fatal overdose if not taken as prescribed. The patient was warned against driving while taking sedation medications or operating heavy machinery. The patient voiced understanding. Sharing medications is a felony. At this point in time, the patient is showing no signs of addiction, abuse, diversion or suicidal ideation. [place] Prescription Drug Monitoring Program report was reviewed and was appropriate      Complete risks and benefits including bleeding, infection, tissue reaction, nerve injury and allergic reaction were discussed. The approach was demonstrated using models and literature was provided. Verbal and written consent was obtained. My impressions and treatment recommendations were discussed in detail with the patient who verbalized understanding and had no further questions. Discharge instructions were provided. I personally saw and examined the patient and I agree with the above discussed plan of care. New Medications Ordered This Visit   Medications   • lidocaine (LIDODERM) 5 %     Sig: Apply 1 patch topically daily Remove & Discard patch within 12 hours or as directed by MD       History of Present Illness    No significant relief of pain or improved ability to participate with IADLs after ILESI at L5-S1. Previously reported the following symptomatology:     Genoveva Spence is a 70 y.o. male  presenting with chronic lumbar radicular pain in the left L5 dermatomal distribution since April. Debilitating pain limited weakness numbness and paresthesias accompany the pain.  The patient rates the pain at a 8/10 accompanied by electric shock-like shooting features and crampy burning pain in the aforementioned dermatomal distributions. The pain is worse in the mornings as well as the end of the day; exertion such as walking for long periods of time seems to exacerbate the pain. The patient can hardly walk more than a few blocks without having debilitating pain. He tries to maintain an active lifestyle and finds that the current degree of pain seems to compromises his efforts. The pain significantly impacts independent activities of daily living and contributes to significant disability. He has attempted physical therapy with exacerbation of the pain. He has taken naproxen, tylenol as well as gabapentin with limited relief of the pain as well. He has never tried epidural steroid injections in the past. He denies any bowel or bladder dysfunction/incontinence, saddle anesthesia or gait instability. I have personally reviewed and/or updated the patient's past medical history, past surgical history, family history, social history, current medications, allergies, and vital signs today. Review of Systems   Constitutional: Positive for activity change. HENT: Negative. Eyes: Negative. Respiratory: Negative. Cardiovascular: Negative. Gastrointestinal: Negative. Endocrine: Negative. Genitourinary: Negative. Musculoskeletal: Positive for arthralgias, back pain, gait problem and myalgias. Skin: Negative. Allergic/Immunologic: Negative. Neurological: Positive for weakness and numbness. Hematological: Negative. Psychiatric/Behavioral: Negative. All other systems reviewed and are negative.       Patient Active Problem List   Diagnosis   • Lumbar spondylosis - Bilateral   • Lumbar radiculopathy       Past Medical History:   Diagnosis Date   • Hypertension        Past Surgical History:   Procedure Laterality Date   • ELBOW SURGERY     • EPIDURAL BLOCK INJECTION N/A 7/27/2023    Procedure: L5-S1 ILESI;  Surgeon: hRea Rosas MD;  Location: [facility];  Service: Pain Management    • REPLACEMENT TOTAL KNEE Bilateral        History reviewed. No pertinent family history. Social History     Occupational History   • Not on file   Tobacco Use   • Smoking status: Never   • Smokeless tobacco: Never   Substance and Sexual Activity   • Alcohol use: Yes   • Drug use: Never   • Sexual activity: Not on file       Current Outpatient Medications on File Prior to Visit   Medication Sig   • acetaminophen (TYLENOL) 500 mg tablet Take 2 tablets by mouth in the morning and 2 tablets in the evening. • Cholecalciferol 25 MCG (1000 UT) tablet Take 1 tablet by mouth every other day   • lidocaine (LIDODERM) 5 % Apply 1 patch topically daily Remove & Discard patch within 12 hours or as directed by MD   • lisinopril (ZESTRIL) 5 mg tablet Take 1 tablet by mouth every morning   • pravastatin (PRAVACHOL) 40 mg tablet Take 40 mg by mouth   • venlafaxine (EFFEXOR-XR) 37.5 mg 24 hr capsule Take 37.5 mg by mouth   • allopurinol (ZYLOPRIM) 100 mg tablet Take 100 mg by mouth (Patient not taking: Reported on 8/21/2023)   • methylPREDNISolone 4 MG tablet therapy pack Use as directed on package (Patient not taking: Reported on 8/21/2023)     No current facility-administered medications on file prior to visit. Allergies   Allergen Reactions   • Ibuprofen Other (See Comments)         Physical Exam    /72 (BP Location: Left arm, Patient Position: Sitting, Cuff Size: Adult)   Pulse 61   Temp (!) 97.2 °F (36.2 °C)   Ht 5' 10" (1.778 m)   Wt 94.5 kg (208 lb 6.4 oz)   SpO2 96%   BMI 29.90 kg/m²     Constitutional: normal, well developed, well nourished, alert, in no distress and non-toxic and no overt pain behavior.  and overweight  Eyes: anicteric  HEENT: grossly intact  Neck: supple, symmetric, trachea midline and no masses   Pulmonary:even and unlabored  Cardiovascular:No edema or pitting edema present  Skin:Normal without rashes or lesions and well hydrated  Psychiatric:Mood and affect appropriate  Neurologic:Cranial Nerves II-XII grossly intact Sensation grossly intact; no clonus negative anders's. Reflexes 2+ and brisk. SLR positive left sided. Musculoskeletal:normal gait. 5/5 strength bilaterally with AROM in all extremities. Difficulty with normal heel toe and tip toe walking. Significant pain with lumbar facet loading bilaterally and with lateral spine rotation, left greater than right. ttp over lumbar paraspinal muscles. Negative basilio's test, negative gaenslen's negative SIJ loading bilaterally.     Imaging    No pertinent imaging to review at this time

## 2023-08-22 ENCOUNTER — HOSPITAL ENCOUNTER (OUTPATIENT)
Facility: HOSPITAL | Age: 63
Setting detail: OUTPATIENT SURGERY
Discharge: HOME | End: 2023-08-22
Attending: ANESTHESIOLOGY | Admitting: ANESTHESIOLOGY
Payer: COMMERCIAL

## 2023-08-22 ENCOUNTER — APPOINTMENT (OUTPATIENT)
Dept: RADIOLOGY | Facility: HOSPITAL | Age: 63
End: 2023-08-22
Payer: COMMERCIAL

## 2023-08-22 VITALS
SYSTOLIC BLOOD PRESSURE: 143 MMHG | WEIGHT: 208 LBS | DIASTOLIC BLOOD PRESSURE: 69 MMHG | OXYGEN SATURATION: 99 % | BODY MASS INDEX: 29.78 KG/M2 | HEIGHT: 70 IN | HEART RATE: 53 BPM | RESPIRATION RATE: 20 BRPM | TEMPERATURE: 97.7 F

## 2023-08-22 PROCEDURE — 64483 NJX AA&/STRD TFRM EPI L/S 1: CPT | Performed by: ANESTHESIOLOGY

## 2023-08-22 PROCEDURE — 64484 NJX AA&/STRD TFRM EPI L/S EA: CPT | Performed by: ANESTHESIOLOGY

## 2023-08-22 RX ORDER — BETAMETHASONE SODIUM PHOSPHATE AND BETAMETHASONE ACETATE 3; 3 MG/ML; MG/ML
INJECTION, SUSPENSION INTRA-ARTICULAR; INTRALESIONAL; INTRAMUSCULAR; SOFT TISSUE AS NEEDED
Status: DISCONTINUED | OUTPATIENT
Start: 2023-08-22 | End: 2023-08-22 | Stop reason: HOSPADM

## 2023-08-22 RX ORDER — LIDOCAINE HYDROCHLORIDE 10 MG/ML
INJECTION, SOLUTION EPIDURAL; INFILTRATION; INTRACAUDAL; PERINEURAL AS NEEDED
Status: DISCONTINUED | OUTPATIENT
Start: 2023-08-22 | End: 2023-08-22 | Stop reason: HOSPADM

## 2023-08-22 NOTE — INTERVAL H&P NOTE
H&P reviewed. After examining the patient I find no changes in the patients condition since the H&P had been written.     Vitals:    08/22/23 1333   BP: 134/74   Pulse: (!) 50   Resp: 18   Temp: 97.7 °F (36.5 °C)   SpO2: 98%

## 2023-08-22 NOTE — DISCHARGE INSTR - AVS FIRST PAGE
YOUR 2 WEEK FOLLOW UP HAS BEEN SCHEDULED; IF YOU WISH TO CHANGE THE FOLLOW UP, PLEASE CALL THE [facility] [place]: [phone]    Epidural Steroid Injection   AMBULATORY CARE:   What you need to know about an epidural steroid injection (LUIS):  An LUIS is a procedure to inject steroid medicine into the epidural space. The epidural space is between your spinal cord and vertebrae. Steroids reduce inflammation and fluid buildup in your spine that may be causing pain. You may be given pain medicine along with the steroids. How to prepare for an LUIS:  Your provider will talk to you about how to prepare for your procedure. He or she will tell you what medicines to take or not take on the day of your procedure. You may need to stop taking blood thinners or other medicines several days before your procedure. You may need to adjust any diabetes medicine you take on the day of your procedure. Steroid medicine can increase your blood sugar level. Arrange for someone to drive you home when you are discharged. What will happen during an LUIS:   You will be given medicine to numb the procedure area. You will be awake for the procedure, but you will not feel pain. You may also be given medicine to help you relax. Contrast liquid will be used to help your provider see the area better. Tell the provider if you have ever had an allergic reaction to contrast liquid. Your provider may place the needle into your neck area, middle of your back, or tailbone area. He or she may inject the medicine next to the nerves that are causing your pain. He or she may instead inject the medicine into a larger area of the epidural space. This helps the medicine spread to more nerves. Your provider will use a fluoroscope to help guide the needle to the right place. A fluoroscope is a type of x-ray. After the procedure, a bandage will be placed over the injection site to prevent infection.     What will happen after an LUIS: You will have a bandage over the injection site to prevent infection. Your provider will tell you when you can bathe and any activity guidelines. You will be able to go home. Risks of an LUIS:  You may have temporary or permanent nerve damage or paralysis. You may have bleeding or develop a serious infection, such as meningitis (swelling of the brain coverings). An abscess may also develop. An abscess is a pus-filled area under the skin. You may need surgery to fix the abscess. You may have a seizure, anxiety, or trouble sleeping. If you are a man, you may have temporary erectile dysfunction (not able to have an erection). Call your local emergency number (911 in the [address]) if:   You have a seizure. You have trouble moving your legs. Seek care immediately if:   Blood soaks through your bandage. You have a fever or chills, severe back pain, and the procedure area is sensitive to the touch. You cannot control when you urinate or have a bowel movement. Call your doctor if:   You have weakness or numbness in your legs. Your wound is red, swollen, or draining pus. You have nausea or are vomiting. Your face or neck is red and you feel warm. You have more pain than you had before the procedure. You have swelling in your hands or feet. You have questions or concerns about your condition or care. Care for your wound as directed: You may remove the bandage before you go to bed the day of your procedure. You may take a shower, but do not take a bath for at least 24 hours. Self-care:   Do not drive,  use machines, or do strenuous activity for 24 hours after your procedure or as directed. Continue other treatments  as directed. Steroid injections alone will not control your pain. The injections are meant to be used with other treatments, such as physical therapy. Follow up with your doctor as directed:  Write down your questions so you remember to ask them during your visits.    © Copyright [employer] 2022 Information is for End User's use only and may not be sold, redistributed or otherwise used for commercial purposes. The above information is an  only. It is not intended as medical advice for individual conditions or treatments. Talk to your doctor, nurse or pharmacist before following any medical regimen to see if it is safe and effective for you.

## 2023-08-22 NOTE — PROCEDURES
Pre-procedure Diagnosis: Lumbar radiculopathy  Post-procedure Diagnosis: Lumbar radiculopathy  Procedure Title(s):  [LEFT L5 and S1 TRANSFORAMINAL EPIDURAL STEROID INJECTION]  Attending Surgeon:   Latesha Miller MD  Anesthesia:   Local     Indications: The patient is a 70y.o. year-old male with a diagnosis of Lumbar radiculopathyThe patient's history and physical exam were reviewed. The risks, benefits and alternatives to the procedure were discussed, and all questions were answered to the patient's satisfaction. The patient agreed to proceed, and written informed consent was obtained. Procedure in Detail: The patient was brought into the procedure room and placed in the prone position on the fluoroscopy table. The area of the lumbar spine was prepped with chloraprep solution  then draped in a sterile manner. The [L5] vertebral body was identified with AP fluoroscopy. An oblique view to the [LEFT] was obtained to better visualize the inferior junction of the pedicle and transverse process. The 6 o'clock position of the pedicle was marked and identified. The skin and subcutaneous tissues in the area were anesthetized with 1% lidocaine. A 22-gauge, [3.5] inch needle was directed toward the targeted point under fluoroscopy until bone was contacted. The needle was then walked inferiorly until the neural foramen was entered. A lateral fluoroscopic view was then used to place the needle tip at the 10 o'clock position of the foramen. The [LEFT]  S1 foramen was identified and the 2 o'clock position was marked. The skin and subcutaneous tissues in the area were anesthetized with 1% lidocaine. A 22-gauge,  [3.5] inch needle was directed toward the targeted point under fluoroscopy until bone was contacted. The needle was then walked inferiorly until the neural foramen was entered. A lateral fluoroscopic view was then used to place the needle tip in the middle of the foramen.     Negative aspiration was confirmed, and 1 ml Omnipaque 240 was injected at each level. Appropriate neurograms were observed under AP fluoroscopy. Digital subtraction angiography was performed showing no vascular uptake and appropriate spread in the epidural space. Then, after negative aspiration, a solution consisting of [2.5mL] betamethasone (6mg/mL) was easily injected at each level. The needles were removed with a 1% lidocaine flush. The patient's back was cleaned and a bandage was placed over the needle insertion points. Disposition: The patient tolerated the procedure well, and there were no apparent complications. Strength at baseline when examined in post-op area. The patient was taken to the recovery area where written discharge instructions for the procedure were given.       Estimated Blood Loss: None  Specimens Obtained: N/A

## 2023-08-22 NOTE — OP NOTE
OPERATIVE REPORT  PATIENT NAME: Dany Farias    :  1960  MRN: [id number]  Pt Location:  GI ROOM 01    SURGERY DATE: 2023    Surgeon(s) and Role: Dylan Burton MD - Primary    Preop Diagnosis:  Lumbar radiculopathy [M54.16]  Lumbar spondylosis [M47.816]    Post-Op Diagnosis Codes:     * Lumbar radiculopathy [M54.16]     * Lumbar spondylosis [M47.816]    Procedure(s):  Left - LT L5 & S1 TFESI    Specimen(s):  * No specimens in log *    Estimated Blood Loss:   Minimal    Drains:  * No LDAs found *    Anesthesia Type:   Local    Operative Indications:  Lumbar radiculopathy [M54.16]  Lumbar spondylosis [M47.816]    Operative Findings:  Left L5 and S1 nerve roots identified under fluoroscopic guidance with contrast    Complications:   None    Procedure and Technique:  Please see detailed procedure note    I was present for the entire procedure.     Patient Disposition:  PACU     SIGNATURE: Dylan Burton MD  DATE: 2023  TIME: 2:10 PM

## 2023-08-29 ENCOUNTER — TELEPHONE (OUTPATIENT)
Dept: RADIOLOGY | Facility: MEDICAL CENTER | Age: 63
End: 2023-08-29

## 2023-09-18 ENCOUNTER — OFFICE VISIT (OUTPATIENT)
Dept: PAIN MEDICINE | Facility: CLINIC | Age: 63
End: 2023-09-18
Payer: COMMERCIAL

## 2023-09-18 VITALS
SYSTOLIC BLOOD PRESSURE: 138 MMHG | OXYGEN SATURATION: 96 % | HEART RATE: 66 BPM | DIASTOLIC BLOOD PRESSURE: 88 MMHG | WEIGHT: 205 LBS | TEMPERATURE: 97.7 F | HEIGHT: 70 IN | BODY MASS INDEX: 29.35 KG/M2

## 2023-09-18 DIAGNOSIS — M47.816 LUMBAR SPONDYLOSIS: Primary | ICD-10-CM

## 2023-09-18 PROCEDURE — 99214 OFFICE O/P EST MOD 30 MIN: CPT | Performed by: ANESTHESIOLOGY

## 2023-09-18 RX ORDER — LIDOCAINE HYDROCHLORIDE 10 MG/ML
10 INJECTION, SOLUTION EPIDURAL; INFILTRATION; INTRACAUDAL; PERINEURAL ONCE
OUTPATIENT
Start: 2023-09-18 | End: 2023-09-18

## 2023-09-18 RX ORDER — METHYLPREDNISOLONE ACETATE 80 MG/ML
80 INJECTION, SUSPENSION INTRA-ARTICULAR; INTRALESIONAL; INTRAMUSCULAR; PARENTERAL; SOFT TISSUE ONCE
OUTPATIENT
Start: 2023-09-18 | End: 2023-09-18

## 2023-09-18 RX ORDER — BUPIVACAINE HCL/PF 2.5 MG/ML
10 VIAL (ML) INJECTION ONCE
OUTPATIENT
Start: 2023-09-18 | End: 2023-09-18

## 2023-09-18 NOTE — PROGRESS NOTES
Assessment  1. Lumbar spondylosis - Bilateral  -     Case request operating room: BLOCK MEDIAL BRANCH L3, L4, L5 #1; Standing  -     Case request operating room: BLOCK MEDIAL BRANCH L3, L4, L5 #1     Greater than 90% relief of radicular pain and improved ability to participate with IADLs after Left L5 and S1 TFESI. Now mostly describes achy, nagging, indolent, crampy and throbbing pain described by arthritic features; +ttp and facet loading in lumbar spine eliciting pain consistent with lumbar facet arthropathy noted on MRI. Risks discussed with regard to MBB/RFA. Previously reported the following symptomatology:     Left-sided lumbar radicular pain in the L5 dermatomal distribution accompanied by pain limited weakness numbness and paresthesias. Patient has not yet participated with PT recently but has in the past. Chronic pain with decreased participation with IADLs over the past 3 months. Has been taking OTC tylenol in addition to gabapentin and steroids with modest benefit. 5/5 strength bilaterally, positive SLR left-sided. Reflexes 2+. Additionally there is positive facet loading, left greater than right. Denies any gait instability, saddle anesthesia. No imaging to review. Risks, benefits alternatives to epidural steroid injections thoroughly discussed with patient. Handouts provided questions answered to patient's satisfaction. Lifestyle modifications extensively discussed including diet, exercise and weight loss in addition to core strengthening. Will proceed with multimodal pain therapy plan as noted below:    Plan  -Bilateral L3, L4, L5 medial branch blocks #1; f/u 2 weeks post procedure  -lyrica 50 mg t.i.d. Ordered for patient; counseled regarding sedative effects of taking this medication and provided up titration calendar.   Counseled not to take medication while driving or operating heavy machinery/using stairs  -script provided for formal physical therapy for left-sided lumbar radiculopathy; Physician directed home exercise plan as per [employer] demonstrated and handouts provided that patient plans to participate with for 1 hour, twice a week for the next 6 weeks. There are risks associated with opioid medications, including dependence, addiction and tolerance. The patient understands and agrees to use these medications only as prescribed. Potential side effects of the medications include, but are not limited to, constipation, drowsiness, addiction, impaired judgment and risk of fatal overdose if not taken as prescribed. The patient was warned against driving while taking sedation medications or operating heavy machinery. The patient voiced understanding. Sharing medications is a felony. At this point in time, the patient is showing no signs of addiction, abuse, diversion or suicidal ideation. [place] Prescription Drug Monitoring Program report was reviewed and was appropriate      Complete risks and benefits including bleeding, infection, tissue reaction, nerve injury and allergic reaction were discussed. The approach was demonstrated using models and literature was provided. Verbal and written consent was obtained. My impressions and treatment recommendations were discussed in detail with the patient who verbalized understanding and had no further questions. Discharge instructions were provided. I personally saw and examined the patient and I agree with the above discussed plan of care. No orders of the defined types were placed in this encounter. History of Present Illness     Greater than 90% relief of radicular pain and improved ability to participate with IADLs after Left L5 and S1 TFESI.  Now mostly describes achy, nagging, indolent, crampy and throbbing pain described by arthritic featuresPreviously reported the following symptomatology:     Azalea Leach is a 70 y.o. male  presenting with chronic lumbar radicular pain in the left L5 dermatomal distribution since April. Debilitating pain limited weakness numbness and paresthesias accompany the pain. The patient rates the pain at a 8/10 accompanied by electric shock-like shooting features and crampy burning pain in the aforementioned dermatomal distributions. The pain is worse in the mornings as well as the end of the day; exertion such as walking for long periods of time seems to exacerbate the pain. The patient can hardly walk more than a few blocks without having debilitating pain. He tries to maintain an active lifestyle and finds that the current degree of pain seems to compromises his efforts. The pain significantly impacts independent activities of daily living and contributes to significant disability. He has attempted physical therapy with exacerbation of the pain. He has taken naproxen, tylenol as well as gabapentin with limited relief of the pain as well. He has never tried epidural steroid injections in the past. He denies any bowel or bladder dysfunction/incontinence, saddle anesthesia or gait instability. I have personally reviewed and/or updated the patient's past medical history, past surgical history, family history, social history, current medications, allergies, and vital signs today. Review of Systems   Constitutional: Positive for activity change. HENT: Negative. Eyes: Negative. Respiratory: Negative. Cardiovascular: Negative. Gastrointestinal: Negative. Endocrine: Negative. Genitourinary: Negative. Musculoskeletal: Positive for arthralgias, back pain, gait problem and myalgias. Skin: Negative. Allergic/Immunologic: Negative. Neurological: Positive for weakness and numbness. Hematological: Negative. Psychiatric/Behavioral: Negative. All other systems reviewed and are negative.       Patient Active Problem List   Diagnosis   • Lumbar spondylosis - Bilateral   • Lumbar radiculopathy       Past Medical History:   Diagnosis Date   • Hypertension Past Surgical History:   Procedure Laterality Date   • ELBOW SURGERY     • EPIDURAL BLOCK INJECTION N/A 7/27/2023    Procedure: L5-S1 ILESI;  Surgeon: Camelia Wen MD;  Location: [facility];  Service: Pain Management    • EPIDURAL BLOCK INJECTION Left 8/22/2023    Procedure: LT L5 & S1 TFESI;  Surgeon: Camelia Wen MD;  Location: [facility];  Service: Pain Management    • REPLACEMENT TOTAL KNEE Bilateral        History reviewed. No pertinent family history. Social History     Occupational History   • Not on file   Tobacco Use   • Smoking status: Never   • Smokeless tobacco: Never   Substance and Sexual Activity   • Alcohol use: Yes   • Drug use: Never   • Sexual activity: Not on file       Current Outpatient Medications on File Prior to Visit   Medication Sig   • acetaminophen (TYLENOL) 500 mg tablet Take 2 tablets by mouth in the morning and 2 tablets in the evening. • Cholecalciferol 25 MCG (1000 UT) tablet Take 1 tablet by mouth every other day   • lidocaine (LIDODERM) 5 % Apply 1 patch topically daily Remove & Discard patch within 12 hours or as directed by MD   • lisinopril (ZESTRIL) 5 mg tablet Take 1 tablet by mouth every morning   • pravastatin (PRAVACHOL) 40 mg tablet Take 40 mg by mouth   • allopurinol (ZYLOPRIM) 100 mg tablet Take 100 mg by mouth (Patient not taking: Reported on 8/21/2023)   • methylPREDNISolone 4 MG tablet therapy pack Use as directed on package (Patient not taking: Reported on 8/21/2023)   • pregabalin (LYRICA) 50 mg capsule Take 1 capsule (50 mg total) by mouth 3 (three) times a day (Patient not taking: Reported on 9/18/2023)   • venlafaxine (EFFEXOR-XR) 37.5 mg 24 hr capsule Take 37.5 mg by mouth (Patient not taking: Reported on 9/18/2023)     No current facility-administered medications on file prior to visit.        Allergies   Allergen Reactions   • Ibuprofen Other (See Comments)         Physical Exam    /88 (BP Location: Left arm, Patient Position: Sitting, Cuff Size: Adult)   Pulse 66   Temp 97.7 °F (36.5 °C)   Ht 5' 10" (1.778 m)   Wt 93 kg (205 lb)   SpO2 96%   BMI 29.41 kg/m²     Constitutional: normal, well developed, well nourished, alert, in no distress and non-toxic and no overt pain behavior. and overweight  Eyes: anicteric  HEENT: grossly intact  Neck: supple, symmetric, trachea midline and no masses   Pulmonary:even and unlabored  Cardiovascular:No edema or pitting edema present  Skin:Normal without rashes or lesions and well hydrated  Psychiatric:Mood and affect appropriate  Neurologic:Cranial Nerves II-XII grossly intact Sensation grossly intact; no clonus negative anders's. Reflexes 2+ and brisk. SLR positive left sided. Musculoskeletal:normal gait. 5/5 strength bilaterally with AROM in all extremities. Difficulty with normal heel toe and tip toe walking. Significant pain with lumbar facet loading bilaterally and with lateral spine rotation, left greater than right. ttp over lumbar paraspinal muscles. Negative basilio's test, negative gaenslen's negative SIJ loading bilaterally.     Imaging    No pertinent imaging to review at this time

## 2023-09-18 NOTE — PATIENT INSTRUCTIONS
Core Strengthening Exercises   WHAT YOU NEED TO KNOW:   Your core includes the muscles of your lower back, hip, pelvis, and abdomen. Core strengthening exercises help heal and strengthen these muscles. This helps prevent another injury, and keeps your pelvis, spine, and hips in the correct position. DISCHARGE INSTRUCTIONS:   Call your doctor or physical therapist if:   You have sharp or worsening pain during exercise or at rest.    You have questions or concerns about your shoulder exercises. Safety tips:  Talk to your healthcare provider before you start an exercise program. A physical therapist can teach you how to do core strengthening exercises safely. Do the exercises on a mat or firm surface. A firm surface will support your spine and prevent low back pain. Do not do these exercises on a bed. Move slowly and smoothly. Avoid fast or jerky motions. Stop if you feel pain. You may feel some discomfort at first, but you should not feel pain. Tell your provider or physical therapist if you have pain while you exercise. Regular exercise will help decrease your discomfort over time. Breathe normally during core exercises. Do not hold your breath. This may cause an increase in blood pressure and prevent muscle strengthening. Your healthcare provider will tell you when to inhale and exhale during the exercise. Begin all of your exercises with abdominal bracing. Abdominal bracing helps warm up your core muscles. You can also practice abdominal bracing throughout the day. Lie on your back with your knees bent and feet flat on the floor. Place your arms in a relaxed position beside your body. Tighten your abdominal muscles. Pull your belly button in and up toward your spine. Hold for 5 seconds. Relax your muscles. Repeat 10 times. Core strengthening exercises: Your healthcare provider will tell you how often to do these exercises.  The provider will also tell you how many repetitions of each exercise you should do. Hold each exercise for 5 seconds or as directed. As you get stronger, increase your hold to 10 to 15 seconds. You can do some of these exercises on a stability ball, or with a weight. Ask your healthcare provider how to use a stability ball or weight for these exercises:  Bridging:  Lie on your back with your knees bent and feet flat on the floor. Rest your arms at your side. Tighten your buttocks, and then lift your hips 1 inch off the floor. Hold for 5 seconds. When you can do this exercise without pain for 10 seconds, increase the distance you lift your hips. A good goal is to be able to lift your hips so that your shoulders, hips, and knees are in a straight line. Dead bug:  Lie on your back with your knees bent and feet flat on the floor. Place your arms in a relaxed position beside your body. Begin with abdominal bracing. Next, raise one leg, keeping your knee bent. Hold for 5 seconds. Repeat with the other leg. When you can do this exercise without pain for 10 to 15 seconds, you may raise one straight leg and hold. Repeat with the other leg. Quadruped:  Place your hands and knees on the floor. Keep your wrists directly below your shoulders and your knees directly below your hips. Pull your belly button in toward your spine. Do not flatten or arch your back. Tighten your abdominal muscles below your belly button. Hold for 5 seconds. When you can do this exercise without pain for 10 to 15 seconds, you may extend one arm and hold. Repeat on the other side. Side bridge exercises:      Standing side bridge:  Stand next to a wall and extend one arm toward the wall. Place your palm flat on the wall with your fingers pointing upward. Begin with abdominal bracing. Next, without moving your feet, slowly bend your arm to 90 degrees. Hold for 5 seconds. Repeat on the other side.  When you can do this exercise without pain for 10 to 15 seconds, you may do the bent leg side bridge on the floor. Bent leg side bridge:  Lie on one side with your legs, hips, and shoulders in a straight line. Prop yourself up onto your forearm so your elbow is directly below your shoulder. Bend your knees back to 90 degrees. Begin with abdominal bracing. Next, lift your hips and balance yourself on your forearm and knees. Hold for 5 seconds. Repeat on the other side. When you can do this exercise without pain for 10 to 15 seconds, you may do the straight leg side bridge on the floor. Straight leg side bridge:  Lie on one side with your legs, hips, and shoulders in a straight line. Prop yourself up onto your forearm so your elbow is directly below your shoulder. Begin with abdominal bracing. Lift your hips off the floor and balance yourself on your forearm and the outside of your flexed foot. Do not let your ankle bend sideways. Hold for 5 seconds. Repeat on the other side. When you can do this exercise without pain for 10 to 15 seconds, ask your healthcare provider for more advanced exercises. Superman:  Lie on your stomach. Extend your arms forward on the floor. Tighten your abdominal muscles and lift your right hand and left leg off the floor. Hold this position. Slowly return to the starting position. Tighten your abdominal muscles and lift your left hand and right leg off the floor. Hold this position. Slowly return to the starting position. Clam:  Lie on your side with your knees bent. Put your bottom arm under your head to keep your neck in line. Put your top hand on your hip to keep your pelvis from moving. Put your heels together, and keep them together during this exercise. Slowly raise your top knee toward the ceiling. Then lower your leg so your knees are together. Repeat this exercise 10 times. Then switch sides and do the exercise 10 times with the other leg. Curl up:  Lie on your back with your knees bent and feet flat on the floor.  Place your hands, palms down, underneath your lower back. Next, with your elbows on the floor, lift your shoulders and chest 2 to 3 inches off the floor. Keep your head in line with your shoulders. Hold this position. Slowly return to the starting position. Straight leg raises:  Lie on your back with one leg straight. Bend the other knee and place your foot flat on the floor. Tighten your abdominal muscles. Keep your leg straight and slowly lift it straight up 6 to 12 inches off the floor. Hold this position. Lower your leg slowly. Do as many repetitions as directed on this side. Repeat with the other leg. Plank:  Lie on your stomach. Bend your elbows and place your forearms flat on the floor. Lift your chest, stomach, and knees off the floor. Make sure your elbows are below your shoulders. Your body should be in a straight line. Do not let your hips or lower back sink to the ground. Squeeze your abdominal muscles together and hold for 15 seconds. To make this exercise harder, hold for 30 seconds or lift 1 leg at a time. Bicycles:  Lie on your back. Bend both knees and bring them toward your chest. Your calves should be parallel to the floor. Place the palms of your hands on the back of your head. Straighten your right leg and keep it lifted 2 inches off the floor. Raise your head and shoulders off the floor and twist towards your left. Keep your head and shoulders lifted. Bend your right knee while you straighten your left leg. Keep your left leg 2 inches off the floor. Twist your head and chest towards the left leg. Continue to straighten 1 leg at a time and twist.       Follow up with your doctor or physical therapist as directed:  Write down your questions so you remember to ask them during your visits. © Copyright Juadh Campbell 2022 Information is for End User's use only and may not be sold, redistributed or otherwise used for commercial purposes. The above information is an  only.  It is not intended as medical advice for individual conditions or treatments. Talk to your doctor, nurse or pharmacist before following any medical regimen to see if it is safe and effective for you.

## 2023-10-09 ENCOUNTER — TELEPHONE (OUTPATIENT)
Dept: PAIN MEDICINE | Facility: CLINIC | Age: 63
End: 2023-10-09

## 2023-10-09 NOTE — TELEPHONE ENCOUNTER
Caller: Joni    Doctor: Shavon Adams    Reason for call: patient was called with time to confirm appt but he does not have ride so he cancelled procedure he is calling to reschedule procedure     Call back#: [phone]

## 2023-10-09 NOTE — TELEPHONE ENCOUNTER
Called patient and he needs to hold off for now so procedure is canceled for 10/10/23. He is aware his [facility] is only good until 12/25/23 and then he will need to get updated auth beyond that.

## 2023-11-06 NOTE — DISCHARGE INSTR - AVS FIRST PAGE
YOUR 2 WEEK FOLLOW UP HAS BEEN SCHEDULED; IF YOU WISH TO CHANGE THE FOLLOW UP, PLEASE CALL THE [facility] [place]: [phone]    Epidural Steroid Injection   WHAT YOU NEED TO KNOW:   An epidural steroid injection (LUIS) is a procedure to inject steroid medicine into the epidural space. The epidural space is between your spinal cord and vertebrae. Steroids reduce inflammation and fluid buildup in your spine that may be causing pain. You may be given pain medicine along with the steroids. DISCHARGE INSTRUCTIONS:   Call your local emergency number (91 in the [address]) if:   You have a seizure. You have trouble moving your legs. Seek care immediately if:   Blood soaks through your bandage. You have a fever or chills, severe back pain, and the procedure area is sensitive to the touch. You cannot control when you urinate or have a bowel movement. Call your doctor if:   You have weakness or numbness in your legs. Your wound is red, swollen, or draining pus. You have nausea or are vomiting. Your face or neck is red and you feel warm. You have more pain than you had before the procedure. You have swelling in your hands or feet. You have questions or concerns about your condition or care. Care for your wound as directed: You may remove the bandage before you go to bed the day of your procedure. You may take a shower, but do not take a bath for at least 24 hours. Self-care:   Do not drive,  use machines, or do strenuous activity for 24 hours after your procedure or as directed. Continue other treatments  as directed. Steroid injections alone will not control your pain. The injections are meant to be used with other treatments, such as physical therapy. Follow up with your doctor as directed:  Write down your questions so you remember to ask them during your visits.      EPIDURAL STEROID INJECTION DISCHARGE INSTRUCTIONS      ACTIVITY  Do not drive or operate machinery today. No strenuous activity today - bending, lifting, etc.   You may resume normal activities starting tomorrow - start slowly and as tolerated. You may shower today, but not tub baths or hot tubs. You may have numbness for several hours from the local anesthetics. Please use caution and common sense, especially with weight-bearing activities. CARE OF THE INJECTION SITE  If you have soreness or pain apply ice to the area today (20 minutes on and 20 minutes off). Starting tomorrow, you   Notify the [facility] if you have any of the following: redness, drainage, swelling or fever above 100°F.    SPECIAL INSTRUCTIONS  Please return the MBB diary to our office by mail, fax, or drop it off. MEDICATIONS  Please do not take any break through or short acting pain medications for 8 hours after the block. Continue to take all routine medications. Our office may have instructed you to hold some medications.   You may resume ______

## 2023-11-07 ENCOUNTER — APPOINTMENT (OUTPATIENT)
Dept: RADIOLOGY | Facility: HOSPITAL | Age: 63
End: 2023-11-07
Payer: COMMERCIAL

## 2023-11-07 ENCOUNTER — HOSPITAL ENCOUNTER (OUTPATIENT)
Facility: HOSPITAL | Age: 63
Setting detail: OUTPATIENT SURGERY
Discharge: HOME | End: 2023-11-07
Attending: ANESTHESIOLOGY | Admitting: ANESTHESIOLOGY
Payer: COMMERCIAL

## 2023-11-07 VITALS
DIASTOLIC BLOOD PRESSURE: 80 MMHG | TEMPERATURE: 97.7 F | RESPIRATION RATE: 18 BRPM | BODY MASS INDEX: 29.35 KG/M2 | HEIGHT: 70 IN | SYSTOLIC BLOOD PRESSURE: 129 MMHG | WEIGHT: 205 LBS | OXYGEN SATURATION: 100 % | HEART RATE: 47 BPM

## 2023-11-07 PROCEDURE — 64484 NJX AA&/STRD TFRM EPI L/S EA: CPT | Performed by: ANESTHESIOLOGY

## 2023-11-07 PROCEDURE — 64483 NJX AA&/STRD TFRM EPI L/S 1: CPT | Performed by: ANESTHESIOLOGY

## 2023-11-07 RX ORDER — LIDOCAINE HYDROCHLORIDE 10 MG/ML
INJECTION, SOLUTION EPIDURAL; INFILTRATION; INTRACAUDAL; PERINEURAL AS NEEDED
Status: DISCONTINUED | OUTPATIENT
Start: 2023-11-07 | End: 2023-11-07 | Stop reason: HOSPADM

## 2023-11-07 RX ORDER — BETAMETHASONE SODIUM PHOSPHATE AND BETAMETHASONE ACETATE 3; 3 MG/ML; MG/ML
INJECTION, SUSPENSION INTRA-ARTICULAR; INTRALESIONAL; INTRAMUSCULAR; SOFT TISSUE AS NEEDED
Status: DISCONTINUED | OUTPATIENT
Start: 2023-11-07 | End: 2023-11-07 | Stop reason: HOSPADM

## 2023-11-07 NOTE — H&P
Assessment  1. Lumbar spondylosis - Bilateral  -     Case request operating room: BLOCK MEDIAL BRANCH L3, L4, L5 #1; Standing  -     Case request operating room: BLOCK MEDIAL BRANCH L3, L4, L5 #1     Greater than 90% relief of radicular pain and improved ability to participate with IADLs after Left L5 and S1 TFESI for nearly 2 months. Additionally describes achy, nagging, indolent, crampy and throbbing pain described by arthritic features; +ttp and facet loading in lumbar spine eliciting pain consistent with lumbar facet arthropathy noted on MRI. Risks discussed with regard to MBB/RFA. Previously reported the following symptomatology:     Left-sided lumbar radicular pain in the L5 dermatomal distribution accompanied by pain limited weakness numbness and paresthesias. Patient has not yet participated with PT recently but has in the past. Chronic pain with decreased participation with IADLs over the past 3 months. Has been taking OTC tylenol in addition to gabapentin and steroids with modest benefit. 5/5 strength bilaterally, positive SLR left-sided. Reflexes 2+. Additionally there is positive facet loading, left greater than right. Denies any gait instability, saddle anesthesia. No imaging to review. Risks, benefits alternatives to epidural steroid injections thoroughly discussed with patient. Handouts provided questions answered to patient's satisfaction. Lifestyle modifications extensively discussed including diet, exercise and weight loss in addition to core strengthening. Will proceed with multimodal pain therapy plan as noted below:    Plan  -Left L5 and S1 TFESI; f/u 2 weeks post procedure  -lyrica 50 mg t.i.d. Ordered for patient; counseled regarding sedative effects of taking this medication and provided up titration calendar.   Counseled not to take medication while driving or operating heavy machinery/using stairs  -script provided for formal physical therapy for left-sided lumbar radiculopathy; Physician directed home exercise plan as per [employer] demonstrated and handouts provided that patient plans to participate with for 1 hour, twice a week for the next 6 weeks. There are risks associated with opioid medications, including dependence, addiction and tolerance. The patient understands and agrees to use these medications only as prescribed. Potential side effects of the medications include, but are not limited to, constipation, drowsiness, addiction, impaired judgment and risk of fatal overdose if not taken as prescribed. The patient was warned against driving while taking sedation medications or operating heavy machinery. The patient voiced understanding. Sharing medications is a felony. At this point in time, the patient is showing no signs of addiction, abuse, diversion or suicidal ideation. [place] Prescription Drug Monitoring Program report was reviewed and was appropriate      Complete risks and benefits including bleeding, infection, tissue reaction, nerve injury and allergic reaction were discussed. The approach was demonstrated using models and literature was provided. Verbal and written consent was obtained. My impressions and treatment recommendations were discussed in detail with the patient who verbalized understanding and had no further questions. Discharge instructions were provided. I personally saw and examined the patient and I agree with the above discussed plan of care. No orders of the defined types were placed in this encounter. History of Present Illness     Greater than 90% relief of radicular pain and improved ability to participate with IADLs after Left L5 and S1 TFESI for nearly 2 months.  Additionally describes achy, nagging, indolent, crampy and throbbing pain described by arthritic featuresPreviously reported the following symptomatology:     Oksana Ramirez is a 70 y.o. male  presenting with chronic lumbar radicular pain in the left L5 dermatomal distribution since April. Debilitating pain limited weakness numbness and paresthesias accompany the pain. The patient rates the pain at a 8/10 accompanied by electric shock-like shooting features and crampy burning pain in the aforementioned dermatomal distributions. The pain is worse in the mornings as well as the end of the day; exertion such as walking for long periods of time seems to exacerbate the pain. The patient can hardly walk more than a few blocks without having debilitating pain. He tries to maintain an active lifestyle and finds that the current degree of pain seems to compromises his efforts. The pain significantly impacts independent activities of daily living and contributes to significant disability. He has attempted physical therapy with exacerbation of the pain. He has taken naproxen, tylenol as well as gabapentin with limited relief of the pain as well. He has never tried epidural steroid injections in the past. He denies any bowel or bladder dysfunction/incontinence, saddle anesthesia or gait instability. I have personally reviewed and/or updated the patient's past medical history, past surgical history, family history, social history, current medications, allergies, and vital signs today. Review of Systems   Constitutional:  Positive for activity change. HENT: Negative. Eyes: Negative. Respiratory: Negative. Cardiovascular: Negative. Gastrointestinal: Negative. Endocrine: Negative. Genitourinary: Negative. Musculoskeletal:  Positive for arthralgias, back pain, gait problem and myalgias. Skin: Negative. Allergic/Immunologic: Negative. Neurological:  Positive for weakness and numbness. Hematological: Negative. Psychiatric/Behavioral: Negative. All other systems reviewed and are negative.       Patient Active Problem List   Diagnosis    Lumbar spondylosis - Bilateral    Lumbar radiculopathy       Past Medical History:   Diagnosis Date Hypertension        Past Surgical History:   Procedure Laterality Date    ELBOW SURGERY      EPIDURAL BLOCK INJECTION N/A 7/27/2023    Procedure: L5-S1 ILESI;  Surgeon: Bridget Helm MD;  Location: [facility];  Service: Pain Management     EPIDURAL BLOCK INJECTION Left 8/22/2023    Procedure: LT L5 & S1 TFESI;  Surgeon: Bridget Helm MD;  Location: [facility];  Service: Pain Management     REPLACEMENT TOTAL KNEE Bilateral        History reviewed. No pertinent family history. Social History     Occupational History    Not on file   Tobacco Use    Smoking status: Never    Smokeless tobacco: Never   Substance and Sexual Activity    Alcohol use: Yes    Drug use: Never    Sexual activity: Not on file       Current Outpatient Medications on File Prior to Visit   Medication Sig    acetaminophen (TYLENOL) 500 mg tablet Take 2 tablets by mouth in the morning and 2 tablets in the evening. Cholecalciferol 25 MCG (1000 UT) tablet Take 1 tablet by mouth every other day    lidocaine (LIDODERM) 5 % Apply 1 patch topically daily Remove & Discard patch within 12 hours or as directed by MD    lisinopril (ZESTRIL) 5 mg tablet Take 1 tablet by mouth every morning    pravastatin (PRAVACHOL) 40 mg tablet Take 40 mg by mouth    allopurinol (ZYLOPRIM) 100 mg tablet Take 100 mg by mouth (Patient not taking: Reported on 8/21/2023)    methylPREDNISolone 4 MG tablet therapy pack Use as directed on package (Patient not taking: Reported on 8/21/2023)    pregabalin (LYRICA) 50 mg capsule Take 1 capsule (50 mg total) by mouth 3 (three) times a day (Patient not taking: Reported on 9/18/2023)    venlafaxine (EFFEXOR-XR) 37.5 mg 24 hr capsule Take 37.5 mg by mouth (Patient not taking: Reported on 9/18/2023)     No current facility-administered medications on file prior to visit.        Allergies   Allergen Reactions    Ibuprofen Other (See Comments)         Physical Exam    /88 (BP Location: Left arm, Patient Position: Sitting, Cuff Size: Adult)   Pulse 66   Temp 97.7 °F (36.5 °C)   Ht 5' 10" (1.778 m)   Wt 93 kg (205 lb)   SpO2 96%   BMI 29.41 kg/m²     Constitutional: normal, well developed, well nourished, alert, in no distress and non-toxic and no overt pain behavior. and overweight  Eyes: anicteric  HEENT: grossly intact  Neck: supple, symmetric, trachea midline and no masses   Pulmonary:even and unlabored  Cardiovascular:No edema or pitting edema present  Skin:Normal without rashes or lesions and well hydrated  Psychiatric:Mood and affect appropriate  Neurologic:Cranial Nerves II-XII grossly intact Sensation grossly intact; no clonus negative anders's. Reflexes 2+ and brisk. SLR positive left sided. Musculoskeletal:normal gait. 5/5 strength bilaterally with AROM in all extremities. Difficulty with normal heel toe and tip toe walking. Significant pain with lumbar facet loading bilaterally and with lateral spine rotation, left greater than right. ttp over lumbar paraspinal muscles. Negative basilio's test, negative gaenslen's negative SIJ loading bilaterally.     Imaging    No pertinent imaging to review at this time

## 2023-11-07 NOTE — PROCEDURES
Pre-procedure Diagnosis: Lumbar radiculopathy  Post-procedure Diagnosis: Lumbar radiculopathy  Procedure Title(s):  [LEFT L5 and S1 TRANSFORAMINAL EPIDURAL STEROID INJECTION]  Attending Surgeon:   Gilma Horton MD  Anesthesia:   Local     Indications: The patient is a 70y.o. year-old male with a diagnosis of Lumbar radiculopathy. The patient's history and physical exam were reviewed. The risks, benefits and alternatives to the procedure were discussed, and all questions were answered to the patient's satisfaction. The patient agreed to proceed, and written informed consent was obtained. Procedure in Detail: The patient was brought into the procedure room and placed in the prone position on the fluoroscopy table. The area of the lumbar spine was prepped with chloraprep solution  then draped in a sterile manner. The [L5] vertebral body was identified with AP fluoroscopy. An oblique view to the [LEFT] was obtained to better visualize the inferior junction of the pedicle and transverse process. The 6 o'clock position of the pedicle was marked and identified. The skin and subcutaneous tissues in the area were anesthetized with 1% lidocaine. A 22-gauge, [3.5] inch needle was directed toward the targeted point under fluoroscopy until bone was contacted. The needle was then walked inferiorly until the neural foramen was entered. A lateral fluoroscopic view was then used to place the needle tip at the 10 o'clock position of the foramen. The [LEFT]  S1 foramen was identified and the 2 o'clock position was marked. The skin and subcutaneous tissues in the area were anesthetized with 1% lidocaine. A 22-gauge,  [3.5] inch needle was directed toward the targeted point under fluoroscopy until bone was contacted. The needle was then walked inferiorly until the neural foramen was entered. A lateral fluoroscopic view was then used to place the needle tip in the middle of the foramen.     Negative aspiration was confirmed, and 1 ml Omnipaque 240 was injected at each level. Appropriate neurograms were observed under AP fluoroscopy. Digital subtraction angiography was performed showing no vascular uptake and appropriate spread in the epidural space. Then, after negative aspiration, a solution consisting of [2.5mL] betamethasone (6mg/mL) was easily injected at each level. The needles were removed with a 1% lidocaine flush. The patient's back was cleaned and a bandage was placed over the needle insertion points. Disposition: The patient tolerated the procedure well, and there were no apparent complications. Strength at baseline when examined in post-op area. The patient was taken to the recovery area where written discharge instructions for the procedure were given.       Estimated Blood Loss: None  Specimens Obtained: N/A

## 2023-11-07 NOTE — OP NOTE
OPERATIVE REPORT  PATIENT NAME: Karen Casey    :  1960  MRN: [id number]  Pt Location:  GI ROOM 01    SURGERY DATE: 2023    Surgeon(s) and Role: Rockney Epley, MD - Primary    Preop Diagnosis:  Lumbar radiculopathy [M54.16]  Lumbar spondylosis [M47.816]    Post-Op Diagnosis Codes:     * Lumbar radiculopathy [M54.16]     * Lumbar spondylosis [M47.816]    Procedure(s):  Left - LT L5 & S1 TFESI    Specimen(s):  * No specimens in log *    Estimated Blood Loss:   Minimal    Drains:  * No LDAs found *    Anesthesia Type:   Local    Operative Indications:  Lumbar radiculopathy [M54.16]  Lumbar spondylosis [M47.816]    Operative Findings:  Left L5 and S1 nerve roots identified under fluoroscopic guidance with contrast    Complications:   None    Procedure and Technique:  Please see detailed procedure note    I was present for the entire procedure.     Patient Disposition:  PACU     SIGNATURE: Rockney Epley, MD  DATE: 2023  TIME: 10:09 AM

## 2023-11-14 ENCOUNTER — TELEPHONE (OUTPATIENT)
Dept: RADIOLOGY | Facility: MEDICAL CENTER | Age: 63
End: 2023-11-14

## 2023-11-14 NOTE — TELEPHONE ENCOUNTER
Patient Reports      70   %     improvement post injection    Pain Level   overall much improved can't give  a number  /10  Discomfort in lower spine area. pain in the ..pain in the better

## 2023-11-22 ENCOUNTER — OFFICE VISIT (OUTPATIENT)
Dept: PAIN MEDICINE | Facility: CLINIC | Age: 63
End: 2023-11-22
Payer: COMMERCIAL

## 2023-11-22 VITALS
HEART RATE: 68 BPM | SYSTOLIC BLOOD PRESSURE: 128 MMHG | HEIGHT: 70 IN | WEIGHT: 210 LBS | OXYGEN SATURATION: 97 % | TEMPERATURE: 98.1 F | BODY MASS INDEX: 30.06 KG/M2 | DIASTOLIC BLOOD PRESSURE: 90 MMHG

## 2023-11-22 DIAGNOSIS — M54.16 LUMBAR RADICULOPATHY: ICD-10-CM

## 2023-11-22 DIAGNOSIS — M47.816 LUMBAR SPONDYLOSIS: Primary | ICD-10-CM

## 2023-11-22 PROCEDURE — 99214 OFFICE O/P EST MOD 30 MIN: CPT | Performed by: ANESTHESIOLOGY

## 2023-11-22 RX ORDER — LIDOCAINE HYDROCHLORIDE 10 MG/ML
10 INJECTION, SOLUTION EPIDURAL; INFILTRATION; INTRACAUDAL; PERINEURAL ONCE
OUTPATIENT
Start: 2023-11-22 | End: 2023-11-22

## 2023-11-22 RX ORDER — METHYLPREDNISOLONE ACETATE 80 MG/ML
80 INJECTION, SUSPENSION INTRA-ARTICULAR; INTRALESIONAL; INTRAMUSCULAR; PARENTERAL; SOFT TISSUE ONCE
OUTPATIENT
Start: 2023-11-22 | End: 2023-11-22

## 2023-11-22 RX ORDER — BUPIVACAINE HCL/PF 2.5 MG/ML
10 VIAL (ML) INJECTION ONCE
OUTPATIENT
Start: 2023-11-22 | End: 2023-11-22

## 2023-11-22 NOTE — PROGRESS NOTES
Assessment  1. Lumbar spondylosis - Bilateral    2. Lumbar radiculopathy     Greater than 90% relief of radicular pain and improved ability to participate with IADLs after repeat Left L5 and S1 TFESI. Now mostly describes achy, nagging, indolent, crampy and throbbing pain described by arthritic features; +ttp and facet loading in lumbar spine eliciting pain consistent with lumbar facet arthropathy noted on MRI. Risks discussed with regard to MBB/RFA. Previously reported the following symptomatology:     Left-sided lumbar radicular pain in the L5 dermatomal distribution accompanied by pain limited weakness numbness and paresthesias. Patient has not yet participated with PT recently but has in the past. Chronic pain with decreased participation with IADLs over the past 3 months. Has been taking OTC tylenol in addition to gabapentin and steroids with modest benefit. 5/5 strength bilaterally, positive SLR left-sided. Reflexes 2+. Additionally there is positive facet loading, left greater than right. Denies any gait instability, saddle anesthesia. No imaging to review. Risks, benefits alternatives to epidural steroid injections thoroughly discussed with patient. Handouts provided questions answered to patient's satisfaction. Lifestyle modifications extensively discussed including diet, exercise and weight loss in addition to core strengthening. Will proceed with multimodal pain therapy plan as noted below:    Plan  -Bilateral L3, L4, L5 medial branch blocks #1; f/u 2 weeks post procedure  -lyrica 50 mg t.i.d. Ordered for patient; has since weaned given side effect profile. counseled regarding sedative effects of taking this medication and provided up titration calendar.   Counseled not to take medication while driving or operating heavy machinery/using stairs  -script provided for formal physical therapy for left-sided lumbar radiculopathy; Physician directed home exercise plan as per [employer] demonstrated and handouts provided that patient plans to participate with for 1 hour, twice a week for the next 6 weeks. There are risks associated with opioid medications, including dependence, addiction and tolerance. The patient understands and agrees to use these medications only as prescribed. Potential side effects of the medications include, but are not limited to, constipation, drowsiness, addiction, impaired judgment and risk of fatal overdose if not taken as prescribed. The patient was warned against driving while taking sedation medications or operating heavy machinery. The patient voiced understanding. Sharing medications is a felony. At this point in time, the patient is showing no signs of addiction, abuse, diversion or suicidal ideation. [place] Prescription Drug Monitoring Program report was reviewed and was appropriate      Complete risks and benefits including bleeding, infection, tissue reaction, nerve injury and allergic reaction were discussed. The approach was demonstrated using models and literature was provided. Verbal and written consent was obtained. My impressions and treatment recommendations were discussed in detail with the patient who verbalized understanding and had no further questions. Discharge instructions were provided. I personally saw and examined the patient and I agree with the above discussed plan of care. No orders of the defined types were placed in this encounter. History of Present Illness     Greater than 90% relief of radicular pain and improved ability to participate with IADLs after repeat Left L5 and S1 TFESI. Now mostly describes achy, nagging, indolent, crampy and throbbing pain described by arthritic featuresPreviously reported the following symptomatology:     Pete Wood is a 70 y.o. male  presenting with chronic lumbar radicular pain in the left L5 dermatomal distribution since April.  Debilitating pain limited weakness numbness and paresthesias accompany the pain. The patient rates the pain at a 8/10 accompanied by electric shock-like shooting features and crampy burning pain in the aforementioned dermatomal distributions. The pain is worse in the mornings as well as the end of the day; exertion such as walking for long periods of time seems to exacerbate the pain. The patient can hardly walk more than a few blocks without having debilitating pain. He tries to maintain an active lifestyle and finds that the current degree of pain seems to compromises his efforts. The pain significantly impacts independent activities of daily living and contributes to significant disability. He has attempted physical therapy with exacerbation of the pain. He has taken naproxen, tylenol as well as gabapentin with limited relief of the pain as well. He has never tried epidural steroid injections in the past. He denies any bowel or bladder dysfunction/incontinence, saddle anesthesia or gait instability. I have personally reviewed and/or updated the patient's past medical history, past surgical history, family history, social history, current medications, allergies, and vital signs today. Review of Systems   Constitutional:  Positive for activity change. HENT: Negative. Eyes: Negative. Respiratory: Negative. Cardiovascular: Negative. Gastrointestinal: Negative. Endocrine: Negative. Genitourinary: Negative. Musculoskeletal:  Positive for arthralgias, back pain, gait problem and myalgias. Skin: Negative. Allergic/Immunologic: Negative. Neurological:  Positive for weakness and numbness. Hematological: Negative. Psychiatric/Behavioral: Negative. All other systems reviewed and are negative.       Patient Active Problem List   Diagnosis    Lumbar spondylosis - Bilateral    Lumbar radiculopathy       Past Medical History:   Diagnosis Date    Hypertension        Past Surgical History:   Procedure Laterality Date    ELBOW SURGERY EPIDURAL BLOCK INJECTION N/A 7/27/2023    Procedure: L5-S1 ILESI;  Surgeon: Josey Matias MD;  Location: [facility];  Service: Pain Management     EPIDURAL BLOCK INJECTION Left 8/22/2023    Procedure: LT L5 & S1 TFESI;  Surgeon: Josey Matias MD;  Location: [facility];  Service: Pain Management     EPIDURAL BLOCK INJECTION Left 11/7/2023    Procedure: LT L5 & S1 TFESI;  Surgeon: Josey Matias MD;  Location: [facility];  Service: Pain Management     REPLACEMENT TOTAL KNEE Bilateral        History reviewed. No pertinent family history. Social History     Occupational History    Not on file   Tobacco Use    Smoking status: Never    Smokeless tobacco: Never   Substance and Sexual Activity    Alcohol use: Yes    Drug use: Never    Sexual activity: Not on file       Current Outpatient Medications on File Prior to Visit   Medication Sig    acetaminophen (TYLENOL) 500 mg tablet Take 2 tablets by mouth in the morning and 2 tablets in the evening. Cholecalciferol 25 MCG (1000 UT) tablet Take 1 tablet by mouth every other day    lidocaine (LIDODERM) 5 % Apply 1 patch topically daily Remove & Discard patch within 12 hours or as directed by MD    lisinopril (ZESTRIL) 5 mg tablet Take 1 tablet by mouth every morning    pravastatin (PRAVACHOL) 40 mg tablet Take 40 mg by mouth    venlafaxine (EFFEXOR-XR) 37.5 mg 24 hr capsule Take 37.5 mg by mouth    allopurinol (ZYLOPRIM) 100 mg tablet Take 100 mg by mouth (Patient not taking: Reported on 8/21/2023)    methylPREDNISolone 4 MG tablet therapy pack Use as directed on package (Patient not taking: Reported on 8/21/2023)    pregabalin (LYRICA) 50 mg capsule Take 1 capsule (50 mg total) by mouth 3 (three) times a day (Patient not taking: Reported on 9/18/2023)     No current facility-administered medications on file prior to visit.        Allergies   Allergen Reactions    Ibuprofen Other (See Comments)         Physical Exam    /90 (BP Location: Left arm, Patient Position: Sitting, Cuff Size: Adult)   Pulse 68   Temp 98.1 °F (36.7 °C)   Ht 5' 10" (1.778 m)   Wt 95.3 kg (210 lb)   SpO2 97%   BMI 30.13 kg/m²     Constitutional: normal, well developed, well nourished, alert, in no distress and non-toxic and no overt pain behavior. and overweight  Eyes: anicteric  HEENT: grossly intact  Neck: supple, symmetric, trachea midline and no masses   Pulmonary:even and unlabored  Cardiovascular:No edema or pitting edema present  Skin:Normal without rashes or lesions and well hydrated  Psychiatric:Mood and affect appropriate  Neurologic:Cranial Nerves II-XII grossly intact Sensation grossly intact; no clonus negative anders's. Reflexes 2+ and brisk. SLR positive left sided. Musculoskeletal:normal gait. 5/5 strength bilaterally with AROM in all extremities. Difficulty with normal heel toe and tip toe walking. Significant pain with lumbar facet loading bilaterally and with lateral spine rotation, left greater than right. ttp over lumbar paraspinal muscles. Negative basilio's test, negative gaenslen's negative SIJ loading bilaterally.     Imaging    No pertinent imaging to review at this time

## 2023-11-22 NOTE — PATIENT INSTRUCTIONS
Core Strengthening Exercises   WHAT YOU NEED TO KNOW:   Your core includes the muscles of your lower back, hip, pelvis, and abdomen. Core strengthening exercises help heal and strengthen these muscles. This helps prevent another injury, and keeps your pelvis, spine, and hips in the correct position. DISCHARGE INSTRUCTIONS:   Call your doctor or physical therapist if:   You have sharp or worsening pain during exercise or at rest.    You have questions or concerns about your shoulder exercises. Safety tips:  Talk to your healthcare provider before you start an exercise program. A physical therapist can teach you how to do core strengthening exercises safely. Do the exercises on a mat or firm surface. A firm surface will support your spine and prevent low back pain. Do not do these exercises on a bed. Move slowly and smoothly. Avoid fast or jerky motions. Stop if you feel pain. You may feel some discomfort at first, but you should not feel pain. Tell your provider or physical therapist if you have pain while you exercise. Regular exercise will help decrease your discomfort over time. Breathe normally during core exercises. Do not hold your breath. This may cause an increase in blood pressure and prevent muscle strengthening. Your healthcare provider will tell you when to inhale and exhale during the exercise. Begin all of your exercises with abdominal bracing. Abdominal bracing helps warm up your core muscles. You can also practice abdominal bracing throughout the day. Lie on your back with your knees bent and feet flat on the floor. Place your arms in a relaxed position beside your body. Tighten your abdominal muscles. Pull your belly button in and up toward your spine. Hold for 5 seconds. Relax your muscles. Repeat 10 times. Core strengthening exercises: Your healthcare provider will tell you how often to do these exercises.  The provider will also tell you how many repetitions of each exercise you should do. Hold each exercise for 5 seconds or as directed. As you get stronger, increase your hold to 10 to 15 seconds. You can do some of these exercises on a stability ball, or with a weight. Ask your healthcare provider how to use a stability ball or weight for these exercises:  Bridging:  Lie on your back with your knees bent and feet flat on the floor. Rest your arms at your side. Tighten your buttocks, and then lift your hips 1 inch off the floor. Hold for 5 seconds. When you can do this exercise without pain for 10 seconds, increase the distance you lift your hips. A good goal is to be able to lift your hips so that your shoulders, hips, and knees are in a straight line. Dead bug:  Lie on your back with your knees bent and feet flat on the floor. Place your arms in a relaxed position beside your body. Begin with abdominal bracing. Next, raise one leg, keeping your knee bent. Hold for 5 seconds. Repeat with the other leg. When you can do this exercise without pain for 10 to 15 seconds, you may raise one straight leg and hold. Repeat with the other leg. Quadruped:  Place your hands and knees on the floor. Keep your wrists directly below your shoulders and your knees directly below your hips. Pull your belly button in toward your spine. Do not flatten or arch your back. Tighten your abdominal muscles below your belly button. Hold for 5 seconds. When you can do this exercise without pain for 10 to 15 seconds, you may extend one arm and hold. Repeat on the other side. Side bridge exercises:      Standing side bridge:  Stand next to a wall and extend one arm toward the wall. Place your palm flat on the wall with your fingers pointing upward. Begin with abdominal bracing. Next, without moving your feet, slowly bend your arm to 90 degrees. Hold for 5 seconds. Repeat on the other side.  When you can do this exercise without pain for 10 to 15 seconds, you may do the bent leg side bridge on the floor. Bent leg side bridge:  Lie on one side with your legs, hips, and shoulders in a straight line. Prop yourself up onto your forearm so your elbow is directly below your shoulder. Bend your knees back to 90 degrees. Begin with abdominal bracing. Next, lift your hips and balance yourself on your forearm and knees. Hold for 5 seconds. Repeat on the other side. When you can do this exercise without pain for 10 to 15 seconds, you may do the straight leg side bridge on the floor. Straight leg side bridge:  Lie on one side with your legs, hips, and shoulders in a straight line. Prop yourself up onto your forearm so your elbow is directly below your shoulder. Begin with abdominal bracing. Lift your hips off the floor and balance yourself on your forearm and the outside of your flexed foot. Do not let your ankle bend sideways. Hold for 5 seconds. Repeat on the other side. When you can do this exercise without pain for 10 to 15 seconds, ask your healthcare provider for more advanced exercises. Superman:  Lie on your stomach. Extend your arms forward on the floor. Tighten your abdominal muscles and lift your right hand and left leg off the floor. Hold this position. Slowly return to the starting position. Tighten your abdominal muscles and lift your left hand and right leg off the floor. Hold this position. Slowly return to the starting position. Clam:  Lie on your side with your knees bent. Put your bottom arm under your head to keep your neck in line. Put your top hand on your hip to keep your pelvis from moving. Put your heels together, and keep them together during this exercise. Slowly raise your top knee toward the ceiling. Then lower your leg so your knees are together. Repeat this exercise 10 times. Then switch sides and do the exercise 10 times with the other leg. Curl up:  Lie on your back with your knees bent and feet flat on the floor.  Place your hands, palms down, underneath your lower back. Next, with your elbows on the floor, lift your shoulders and chest 2 to 3 inches off the floor. Keep your head in line with your shoulders. Hold this position. Slowly return to the starting position. Straight leg raises:  Lie on your back with one leg straight. Bend the other knee and place your foot flat on the floor. Tighten your abdominal muscles. Keep your leg straight and slowly lift it straight up 6 to 12 inches off the floor. Hold this position. Lower your leg slowly. Do as many repetitions as directed on this side. Repeat with the other leg. Plank:  Lie on your stomach. Bend your elbows and place your forearms flat on the floor. Lift your chest, stomach, and knees off the floor. Make sure your elbows are below your shoulders. Your body should be in a straight line. Do not let your hips or lower back sink to the ground. Squeeze your abdominal muscles together and hold for 15 seconds. To make this exercise harder, hold for 30 seconds or lift 1 leg at a time. Bicycles:  Lie on your back. Bend both knees and bring them toward your chest. Your calves should be parallel to the floor. Place the palms of your hands on the back of your head. Straighten your right leg and keep it lifted 2 inches off the floor. Raise your head and shoulders off the floor and twist towards your left. Keep your head and shoulders lifted. Bend your right knee while you straighten your left leg. Keep your left leg 2 inches off the floor. Twist your head and chest towards the left leg. Continue to straighten 1 leg at a time and twist.       Follow up with your doctor or physical therapist as directed:  Write down your questions so you remember to ask them during your visits. © Copyright Remy Aguiar 2023 Information is for End User's use only and may not be sold, redistributed or otherwise used for commercial purposes. The above information is an  only.  It is not intended as medical advice for individual conditions or treatments. Talk to your doctor, nurse or pharmacist before following any medical regimen to see if it is safe and effective for you.

## 2023-11-22 NOTE — H&P (VIEW-ONLY)
Assessment  1. Lumbar spondylosis - Bilateral    2. Lumbar radiculopathy     Greater than 90% relief of radicular pain and improved ability to participate with IADLs after repeat Left L5 and S1 TFESI. Now mostly describes achy, nagging, indolent, crampy and throbbing pain described by arthritic features; +ttp and facet loading in lumbar spine eliciting pain consistent with lumbar facet arthropathy noted on MRI. Risks discussed with regard to MBB/RFA. Previously reported the following symptomatology:     Left-sided lumbar radicular pain in the L5 dermatomal distribution accompanied by pain limited weakness numbness and paresthesias. Patient has not yet participated with PT recently but has in the past. Chronic pain with decreased participation with IADLs over the past 3 months. Has been taking OTC tylenol in addition to gabapentin and steroids with modest benefit. 5/5 strength bilaterally, positive SLR left-sided. Reflexes 2+. Additionally there is positive facet loading, left greater than right. Denies any gait instability, saddle anesthesia. No imaging to review. Risks, benefits alternatives to epidural steroid injections thoroughly discussed with patient. Handouts provided questions answered to patient's satisfaction. Lifestyle modifications extensively discussed including diet, exercise and weight loss in addition to core strengthening. Will proceed with multimodal pain therapy plan as noted below:    Plan  -Bilateral L3, L4, L5 medial branch blocks #1; f/u 2 weeks post procedure  -lyrica 50 mg t.i.d. Ordered for patient; has since weaned given side effect profile. counseled regarding sedative effects of taking this medication and provided up titration calendar.   Counseled not to take medication while driving or operating heavy machinery/using stairs  -script provided for formal physical therapy for left-sided lumbar radiculopathy; Physician directed home exercise plan as per [employer] demonstrated and handouts provided that patient plans to participate with for 1 hour, twice a week for the next 6 weeks. There are risks associated with opioid medications, including dependence, addiction and tolerance. The patient understands and agrees to use these medications only as prescribed. Potential side effects of the medications include, but are not limited to, constipation, drowsiness, addiction, impaired judgment and risk of fatal overdose if not taken as prescribed. The patient was warned against driving while taking sedation medications or operating heavy machinery. The patient voiced understanding. Sharing medications is a felony. At this point in time, the patient is showing no signs of addiction, abuse, diversion or suicidal ideation. [place] Prescription Drug Monitoring Program report was reviewed and was appropriate      Complete risks and benefits including bleeding, infection, tissue reaction, nerve injury and allergic reaction were discussed. The approach was demonstrated using models and literature was provided. Verbal and written consent was obtained. My impressions and treatment recommendations were discussed in detail with the patient who verbalized understanding and had no further questions. Discharge instructions were provided. I personally saw and examined the patient and I agree with the above discussed plan of care. No orders of the defined types were placed in this encounter. History of Present Illness     Greater than 90% relief of radicular pain and improved ability to participate with IADLs after repeat Left L5 and S1 TFESI. Now mostly describes achy, nagging, indolent, crampy and throbbing pain described by arthritic featuresPreviously reported the following symptomatology:     Kelsey Smith is a 70 y.o. male  presenting with chronic lumbar radicular pain in the left L5 dermatomal distribution since April.  Debilitating pain limited weakness numbness and paresthesias accompany the pain. The patient rates the pain at a 8/10 accompanied by electric shock-like shooting features and crampy burning pain in the aforementioned dermatomal distributions. The pain is worse in the mornings as well as the end of the day; exertion such as walking for long periods of time seems to exacerbate the pain. The patient can hardly walk more than a few blocks without having debilitating pain. He tries to maintain an active lifestyle and finds that the current degree of pain seems to compromises his efforts. The pain significantly impacts independent activities of daily living and contributes to significant disability. He has attempted physical therapy with exacerbation of the pain. He has taken naproxen, tylenol as well as gabapentin with limited relief of the pain as well. He has never tried epidural steroid injections in the past. He denies any bowel or bladder dysfunction/incontinence, saddle anesthesia or gait instability. I have personally reviewed and/or updated the patient's past medical history, past surgical history, family history, social history, current medications, allergies, and vital signs today. Review of Systems   Constitutional:  Positive for activity change. HENT: Negative. Eyes: Negative. Respiratory: Negative. Cardiovascular: Negative. Gastrointestinal: Negative. Endocrine: Negative. Genitourinary: Negative. Musculoskeletal:  Positive for arthralgias, back pain, gait problem and myalgias. Skin: Negative. Allergic/Immunologic: Negative. Neurological:  Positive for weakness and numbness. Hematological: Negative. Psychiatric/Behavioral: Negative. All other systems reviewed and are negative.       Patient Active Problem List   Diagnosis    Lumbar spondylosis - Bilateral    Lumbar radiculopathy       Past Medical History:   Diagnosis Date    Hypertension        Past Surgical History:   Procedure Laterality Date    ELBOW SURGERY EPIDURAL BLOCK INJECTION N/A 7/27/2023    Procedure: L5-S1 ILESI;  Surgeon: Pili Wright MD;  Location: [facility];  Service: Pain Management     EPIDURAL BLOCK INJECTION Left 8/22/2023    Procedure: LT L5 & S1 TFESI;  Surgeon: Pili Wright MD;  Location: [facility];  Service: Pain Management     EPIDURAL BLOCK INJECTION Left 11/7/2023    Procedure: LT L5 & S1 TFESI;  Surgeon: Pili Wright MD;  Location: [facility];  Service: Pain Management     REPLACEMENT TOTAL KNEE Bilateral        History reviewed. No pertinent family history. Social History     Occupational History    Not on file   Tobacco Use    Smoking status: Never    Smokeless tobacco: Never   Substance and Sexual Activity    Alcohol use: Yes    Drug use: Never    Sexual activity: Not on file       Current Outpatient Medications on File Prior to Visit   Medication Sig    acetaminophen (TYLENOL) 500 mg tablet Take 2 tablets by mouth in the morning and 2 tablets in the evening. Cholecalciferol 25 MCG (1000 UT) tablet Take 1 tablet by mouth every other day    lidocaine (LIDODERM) 5 % Apply 1 patch topically daily Remove & Discard patch within 12 hours or as directed by MD    lisinopril (ZESTRIL) 5 mg tablet Take 1 tablet by mouth every morning    pravastatin (PRAVACHOL) 40 mg tablet Take 40 mg by mouth    venlafaxine (EFFEXOR-XR) 37.5 mg 24 hr capsule Take 37.5 mg by mouth    allopurinol (ZYLOPRIM) 100 mg tablet Take 100 mg by mouth (Patient not taking: Reported on 8/21/2023)    methylPREDNISolone 4 MG tablet therapy pack Use as directed on package (Patient not taking: Reported on 8/21/2023)    pregabalin (LYRICA) 50 mg capsule Take 1 capsule (50 mg total) by mouth 3 (three) times a day (Patient not taking: Reported on 9/18/2023)     No current facility-administered medications on file prior to visit.        Allergies   Allergen Reactions    Ibuprofen Other (See Comments)         Physical Exam    /90 (BP Location: Left arm, Patient Position: Sitting, Cuff Size: Adult)   Pulse 68   Temp 98.1 °F (36.7 °C)   Ht 5' 10" (1.778 m)   Wt 95.3 kg (210 lb)   SpO2 97%   BMI 30.13 kg/m²     Constitutional: normal, well developed, well nourished, alert, in no distress and non-toxic and no overt pain behavior. and overweight  Eyes: anicteric  HEENT: grossly intact  Neck: supple, symmetric, trachea midline and no masses   Pulmonary:even and unlabored  Cardiovascular:No edema or pitting edema present  Skin:Normal without rashes or lesions and well hydrated  Psychiatric:Mood and affect appropriate  Neurologic:Cranial Nerves II-XII grossly intact Sensation grossly intact; no clonus negative anders's. Reflexes 2+ and brisk. SLR positive left sided. Musculoskeletal:normal gait. 5/5 strength bilaterally with AROM in all extremities. Difficulty with normal heel toe and tip toe walking. Significant pain with lumbar facet loading bilaterally and with lateral spine rotation, left greater than right. ttp over lumbar paraspinal muscles. Negative basilio's test, negative gaenslen's negative SIJ loading bilaterally.     Imaging    No pertinent imaging to review at this time

## 2023-12-04 NOTE — DISCHARGE INSTR - AVS FIRST PAGE
YOUR 2 WEEK FOLLOW UP HAS BEEN SCHEDULED; IF YOU WISH TO CHANGE THE FOLLOW UP, PLEASE CALL THE [facility] [place]: [phone]    MEDIAL BRANCH BLOCK DISCHARGE INSTRUCTIONS      ACTIVITY  Please do activities that will bring the normal pain that we are rating. For example, if vacuuming or walking increases the pain, then do them. This will give the most accurate response to the diary. You may shower, but no tub baths today, or applied heat. CARE OF THE INJECTION SITE  This area may be numb for several hours after the injection. Notify the [facility] if you have any of the following: redness, drainage, swelling or fever above 100°F.    SPECIAL INSTRUCTIONS  Please return the MBB diary to our office by mail, fax, or drop it off. MEDICATIONS  Please do not take any break through or short acting pain medications for 8 hours after the block. Continue to take all routine medications. Our office may have instructed you to hold some medications. You may resume these medications now. If you have any problems specifically related to your procedure, please call our office at [phone]. Problems not related to your procedure should be directed at your primary care physician. Lumbar Radiofrequency Ablation   WHAT YOU NEED TO KNOW:   What do I need to know about lumbar radiofrequency ablation? Lumbar radiofrequency ablation (RFA) is a procedure used to treat facet joint pain in your lower back. Facet joints are found at the back of each vertebra. A needle electrode is used to send electrical currents to the nerves in your facet joint. The electrical currents create heat that damages the nerve so it cannot send pain signals. How do I prepare for lumbar RFA? Your healthcare provider will talk to you about how to prepare for this procedure. You may be told to not to eat or drink anything after midnight on the day of your procedure.  Your provider will tell you what medicines to take or not take on the day of your procedure. What will happen during lumbar RFA? You will lie on your stomach. You will be given local anesthesia to numb the area of your back where the needle electrode will be inserted. You may be given a sedative to help keep you relaxed. You may still feel pressure or pushing during the procedure, but you should not feel any pain. Your healthcare provider will use fluoroscopy (a type of x-ray) to guide the needle electrode to the nerves near your facet joint. Your healthcare provider may touch the affected nerve to make sure the needle electrode is in the right place. You will feel tingling or pressure when your provider does this. Your provider will then apply local anesthesia to the nerve to numb it. This will prevent you from feeling pain when your provider applies heat to the nerve. Your provider will then apply heat to the nerve using the needle electrode. Your provider may need to apply heat to more than one nerve. Your provider will remove the needle electrode and apply a bandage over the area. What are the risks of lumbar RFA? You may have pain, numbness, tingling, or burning in the area where the lumbar RFA was done. These normally go away within 6 weeks. The needle electrode may injure your spinal nerves. This may cause permanent leg weakness or nerve pain. CARE AGREEMENT:   You have the right to help plan your care. Learn about your health condition and how it may be treated. Discuss treatment options with your healthcare providers to decide what care you want to receive. You always have the right to refuse treatment. The above information is an  only. It is not intended as medical advice for individual conditions or treatments. Talk to your doctor, nurse or pharmacist before following any medical regimen to see if it is safe and effective for you.   © Copyright Alina Alicea 2023 Information is for End User's use only and may not be sold, redistributed or otherwise used for commercial purposes.

## 2023-12-05 ENCOUNTER — APPOINTMENT (OUTPATIENT)
Dept: RADIOLOGY | Facility: HOSPITAL | Age: 63
End: 2023-12-05
Payer: COMMERCIAL

## 2023-12-05 ENCOUNTER — HOSPITAL ENCOUNTER (OUTPATIENT)
Facility: HOSPITAL | Age: 63
Setting detail: OUTPATIENT SURGERY
Discharge: HOME | End: 2023-12-05
Attending: ANESTHESIOLOGY | Admitting: ANESTHESIOLOGY
Payer: COMMERCIAL

## 2023-12-05 VITALS
HEART RATE: 51 BPM | SYSTOLIC BLOOD PRESSURE: 124 MMHG | TEMPERATURE: 97 F | DIASTOLIC BLOOD PRESSURE: 71 MMHG | RESPIRATION RATE: 18 BRPM | BODY MASS INDEX: 30.06 KG/M2 | OXYGEN SATURATION: 97 % | WEIGHT: 210 LBS | HEIGHT: 70 IN

## 2023-12-05 PROCEDURE — 64494 INJ PARAVERT F JNT L/S 2 LEV: CPT | Performed by: ANESTHESIOLOGY

## 2023-12-05 PROCEDURE — 64493 INJ PARAVERT F JNT L/S 1 LEV: CPT | Performed by: ANESTHESIOLOGY

## 2023-12-05 RX ORDER — LIDOCAINE HYDROCHLORIDE 10 MG/ML
INJECTION, SOLUTION EPIDURAL; INFILTRATION; INTRACAUDAL; PERINEURAL AS NEEDED
Status: DISCONTINUED | OUTPATIENT
Start: 2023-12-05 | End: 2023-12-05 | Stop reason: HOSPADM

## 2023-12-05 RX ORDER — BUPIVACAINE HYDROCHLORIDE 2.5 MG/ML
INJECTION, SOLUTION EPIDURAL; INFILTRATION; INTRACAUDAL AS NEEDED
Status: DISCONTINUED | OUTPATIENT
Start: 2023-12-05 | End: 2023-12-05 | Stop reason: HOSPADM

## 2023-12-05 RX ORDER — METHYLPREDNISOLONE ACETATE 80 MG/ML
INJECTION, SUSPENSION INTRA-ARTICULAR; INTRALESIONAL; INTRAMUSCULAR; SOFT TISSUE AS NEEDED
Status: DISCONTINUED | OUTPATIENT
Start: 2023-12-05 | End: 2023-12-05 | Stop reason: HOSPADM

## 2023-12-05 NOTE — PROCEDURES
Pre-procedure Diagnosis: Lumbar Facet Arthropathy  Post-procedure Diagnosis: Lumbar Facet Arthropathy  Procedure Title(s):  [BILATERAL L3, L4, L5] medial branch nerve blocks [(DIAGNOSTIC)]  Attending Surgeon:   Austen Pena MD  Anesthesia:   Local     Indications: The patient is a 70y.o. year-old male with a diagnosis of lumbar facet arthropathy. The patient's history and physical exam were reviewed. The risks, benefits and alternatives to the procedure were discussed, and all questions were answered to the patient's satisfaction. The patient agreed to proceed, and written informed consent was obtained. Procedure in Detail: The patient was brought into the procedure room and placed in the prone position on the fluoroscopy table. The area of the lumbar spine was prepped with chloraprep and then draped in a sterile manner. AP fluoroscopy was used to identify the [L3-L5] levels on the [LEFT] side. The C-arm was obliqued to visualize the junction of the superior articulate process and transverse process. The sacral ala was identified and marked. The skin in these identified areas was anesthetized with 1% lidocaine. A 22-gauge, 3½-inch spinal needle was advanced toward each of these points under fluoroscopic guidance. Once bone was contacted, negative aspiration was confirmed and [1-mL] of a [6mL]mixture of [5mL] [0.25% bupivicaine] and 1mL of 80mg/mL Depomedrol was injected at each level. (The same procedure was performed on the RIGHT side.)    After the procedure was completed, the patient's back was cleaned and bandages were placed at the needle insertion sites. Disposition: The patient tolerated the procedure well, and there were no apparent complications. The patient was taken to the recovery area where written discharge instructions for the procedure were given. The patient was given a pain diary to determine if the patient's pain improves following the injection.  Once the diary is returned we will consider next appropriate course of treatment.     Postoperative pain relief [WAS] significant    Estimated Blood Loss: None  Specimens Obtained: N/A

## 2023-12-05 NOTE — OP NOTE
OPERATIVE REPORT  PATIENT NAME: Ray Galvan    :  1960  MRN: [id number]  Pt Location:  GI ROOM 01    SURGERY DATE: 2023    Surgeon(s) and Role: Aurora Villasenor MD - Primary    Preop Diagnosis:  Lumbar spondylosis [M47.816]    Post-Op Diagnosis Codes:     * Lumbar spondylosis [M47.816]    Procedure(s):  Bilateral - BLOCK MEDIAL BRANCH L3. L4. L5 #1    Specimen(s):  * No specimens in log *    Estimated Blood Loss:   Minimal    Drains:  * No LDAs found *    Anesthesia Type:   Local    Operative Indications:  Lumbar spondylosis [M47.816]    Operative Findings:  Bilateral L3, L4, L5 medial branch nerve regions identified under fluoroscopic guidance. Complications:   None    Procedure and Technique:  Please see detailed procedure note    I was present for the entire procedure.     Patient Disposition:  PACU     SIGNATURE: Aurora Villasenor MD  DATE: 2023  TIME: 10:30 AM

## 2023-12-05 NOTE — INTERVAL H&P NOTE
H&P reviewed. After examining the patient I find no changes in the patients condition since the H&P had been written.     Vitals:    12/05/23 0931   BP: 115/68   Pulse: 64   Resp: 20   Temp: 97.7 °F (36.5 °C)   SpO2: 95%

## 2023-12-13 ENCOUNTER — OFFICE VISIT (OUTPATIENT)
Dept: PAIN MEDICINE | Facility: CLINIC | Age: 63
End: 2023-12-13
Payer: COMMERCIAL

## 2023-12-13 VITALS
SYSTOLIC BLOOD PRESSURE: 122 MMHG | BODY MASS INDEX: 29.78 KG/M2 | DIASTOLIC BLOOD PRESSURE: 84 MMHG | HEART RATE: 75 BPM | HEIGHT: 70 IN | TEMPERATURE: 97.2 F | OXYGEN SATURATION: 96 % | WEIGHT: 208 LBS

## 2023-12-13 DIAGNOSIS — M47.816 LUMBAR SPONDYLOSIS: Primary | ICD-10-CM

## 2023-12-13 PROCEDURE — 99214 OFFICE O/P EST MOD 30 MIN: CPT | Performed by: ANESTHESIOLOGY

## 2023-12-13 RX ORDER — BUPIVACAINE HCL/PF 2.5 MG/ML
10 VIAL (ML) INJECTION ONCE
OUTPATIENT
Start: 2023-12-13 | End: 2023-12-13

## 2023-12-13 RX ORDER — LIDOCAINE HYDROCHLORIDE 10 MG/ML
10 INJECTION, SOLUTION EPIDURAL; INFILTRATION; INTRACAUDAL; PERINEURAL ONCE
OUTPATIENT
Start: 2023-12-13 | End: 2023-12-13

## 2023-12-13 RX ORDER — METHYLPREDNISOLONE ACETATE 80 MG/ML
80 INJECTION, SUSPENSION INTRA-ARTICULAR; INTRALESIONAL; INTRAMUSCULAR; PARENTERAL; SOFT TISSUE ONCE
OUTPATIENT
Start: 2023-12-13 | End: 2023-12-13

## 2023-12-13 NOTE — PROGRESS NOTES
Assessment  1. Lumbar spondylosis - Bilateral  -     Case request operating room: BLOCK MEDIAL BRANCH L3, L4, L5 nerves #2; Standing  -     Case request operating room: BLOCK MEDIAL BRANCH L3, L4, L5 nerves #2    Greater than 90% relief of radicular pain and improved ability to participate with IADLs after bilateral L3, L4, L5 medial branch blocks #1      Axial low back pain described primarily by arthritic features. Aching, nagging, indolent, stabbing, throbbing features in axial low back without radicular components. 5/5 strength bilaterally, negative SLR. Positive facet loading maneuvers in lumbar spine elicited pain, positive tenderness to palpation over lumbar paraspinal muscles. Findings correlate with prominent lumbar facet arthropathy seen throughout axial low back on x-ray. Currently he is neurologically intact without gait instability, saddle anesthesia or bowel/bladder abnormality. Risks, benefits alternative to medial branch blocks and subsequent radiofrequency ablation of successful thoroughly discussed with patient. Handouts provided questions answered to patient satisfaction. The patient has been experiencing moderate to severe axial spine pain that is causing functional deficit. The pain has been present for at least 3 months and is not improving with conservative care. Currently the patient is not experiencing any radicular features nor neurogenic claudication. Non-facet pathology has been ruled out on clinical evaluation. Previously reported the following symptomatology:     Left-sided lumbar radicular pain in the L5 dermatomal distribution accompanied by pain limited weakness numbness and paresthesias. Patient has not yet participated with PT recently but has in the past. Chronic pain with decreased participation with IADLs over the past 3 months. Has been taking OTC tylenol in addition to gabapentin and steroids with modest benefit.   5/5 strength bilaterally, positive SLR left-sided. Reflexes 2+. Additionally there is positive facet loading, left greater than right. Denies any gait instability, saddle anesthesia. No imaging to review. Risks, benefits alternatives to epidural steroid injections thoroughly discussed with patient. Handouts provided questions answered to patient's satisfaction. Lifestyle modifications extensively discussed including diet, exercise and weight loss in addition to core strengthening. Will proceed with multimodal pain therapy plan as noted below:    Plan  -Bilateral L3, L4, L5 medial branch blocks #2; f/u 2 weeks post procedure  -lyrica 50 mg t.i.d. Ordered for patient; has since weaned given side effect profile. counseled regarding sedative effects of taking this medication and provided up titration calendar. Counseled not to take medication while driving or operating heavy machinery/using stairs  -script provided for formal physical therapy for left-sided lumbar radiculopathy; Physician directed home exercise plan as per [employer] demonstrated and handouts provided that patient plans to participate with for 1 hour, twice a week for the next 6 weeks. There are risks associated with opioid medications, including dependence, addiction and tolerance. The patient understands and agrees to use these medications only as prescribed. Potential side effects of the medications include, but are not limited to, constipation, drowsiness, addiction, impaired judgment and risk of fatal overdose if not taken as prescribed. The patient was warned against driving while taking sedation medications or operating heavy machinery. The patient voiced understanding. Sharing medications is a felony. At this point in time, the patient is showing no signs of addiction, abuse, diversion or suicidal ideation.      [place] Prescription Drug Monitoring Program report was reviewed and was appropriate      Complete risks and benefits including bleeding, infection, tissue reaction, nerve injury and allergic reaction were discussed. The approach was demonstrated using models and literature was provided. Verbal and written consent was obtained. My impressions and treatment recommendations were discussed in detail with the patient who verbalized understanding and had no further questions. Discharge instructions were provided. I personally saw and examined the patient and I agree with the above discussed plan of care. New Medications Ordered This Visit   Medications    Polyvinyl Alcohol-Povidone PF 1.4-0.6 % SOLN     Sig: INSTILL 1 DROP OF REFRESH TEARS IN EACH EYE 5 TIMES A DAY AS NEEDED FOR CHRONIC DRY EYE/BLEPHARITIS       History of Present Illness    Greater than 90% relief of radicular pain and improved ability to participate with IADLs after bilateral L3, L4, L5 medial branch blocks #1      Peri Beck is a 70 y.o. male  presenting with with a past medical history of chronic low back pain described primarily as arthritic in nature. He describes 8/10 low back pain that is worse in the mornings and worse at the end of the day. The pain is characterized by achy, nagging, indolent, crampy, stabbing pain in his axial low back. The patient describes that the pain is worse with standing for long periods of time on hard surfaces as well as with walking. The patient is a very active individual and feels as though this pain compromises his participation with independent activities of daily living. The pain can be debilitating at times and contribute to significant disability, compromising overall activity and independent activities of daily living. He has tried physical therapy with limited relief of symptoms. Medications the patient has tried in the past include nsaids, tylenol. He describes no radicular symptoms and has good strength. He denies any weakness numbness or paresthesias.     Previously reported the following symptomatology:     Previously reported chronic lumbar radicular pain in the left L5 dermatomal distribution since April. Debilitating pain limited weakness numbness and paresthesias accompany the pain. The patient rates the pain at a 8/10 accompanied by electric shock-like shooting features and crampy burning pain in the aforementioned dermatomal distributions. The pain is worse in the mornings as well as the end of the day; exertion such as walking for long periods of time seems to exacerbate the pain. The patient can hardly walk more than a few blocks without having debilitating pain. He tries to maintain an active lifestyle and finds that the current degree of pain seems to compromises his efforts. The pain significantly impacts independent activities of daily living and contributes to significant disability. He has attempted physical therapy with exacerbation of the pain. He has taken naproxen, tylenol as well as gabapentin with limited relief of the pain as well. He has never tried epidural steroid injections in the past. He denies any bowel or bladder dysfunction/incontinence, saddle anesthesia or gait instability. I have personally reviewed and/or updated the patient's past medical history, past surgical history, family history, social history, current medications, allergies, and vital signs today. Review of Systems   Constitutional:  Positive for activity change. HENT: Negative. Eyes: Negative. Respiratory: Negative. Cardiovascular: Negative. Gastrointestinal: Negative. Endocrine: Negative. Genitourinary: Negative. Musculoskeletal:  Positive for arthralgias, back pain, gait problem and myalgias. Skin: Negative. Allergic/Immunologic: Negative. Neurological:  Positive for weakness and numbness. Hematological: Negative. Psychiatric/Behavioral: Negative. All other systems reviewed and are negative.       Patient Active Problem List   Diagnosis    Lumbar spondylosis - Bilateral    Lumbar radiculopathy       Past Medical History:   Diagnosis Date    Hypertension        Past Surgical History:   Procedure Laterality Date    ELBOW SURGERY      EPIDURAL BLOCK INJECTION N/A 7/27/2023    Procedure: L5-S1 ILESI;  Surgeon: Keyona Van MD;  Location: [facility];  Service: Pain Management     EPIDURAL BLOCK INJECTION Left 8/22/2023    Procedure: LT L5 & S1 TFESI;  Surgeon: Keyona Van MD;  Location: [facility];  Service: Pain Management     EPIDURAL BLOCK INJECTION Left 11/7/2023    Procedure: LT L5 & S1 TFESI;  Surgeon: Keyona Van MD;  Location: [facility];  Service: Pain Management     NERVE BLOCK Bilateral 12/5/2023    Procedure: BLOCK MEDIAL BRANCH L3, L4, L5 #1;  Surgeon: Keyona Van MD;  Location: [facility];  Service: Pain Management     REPLACEMENT TOTAL KNEE Bilateral        History reviewed. No pertinent family history. Social History     Occupational History    Not on file   Tobacco Use    Smoking status: Never    Smokeless tobacco: Never   Substance and Sexual Activity    Alcohol use: Yes    Drug use: Never    Sexual activity: Not on file       Current Outpatient Medications on File Prior to Visit   Medication Sig    acetaminophen (TYLENOL) 500 mg tablet Take 2 tablets by mouth in the morning and 2 tablets in the evening. Cholecalciferol 25 MCG (1000 UT) tablet Take 1 tablet by mouth every other day    lidocaine (LIDODERM) 5 % Apply 1 patch topically daily Remove & Discard patch within 12 hours or as directed by MD    lisinopril (ZESTRIL) 5 mg tablet Take 1 tablet by mouth every morning    Polyvinyl Alcohol-Povidone PF 1.4-0.6 % SOLN INSTILL 1 DROP OF REFRESH TEARS IN EACH EYE 5 TIMES A DAY AS NEEDED FOR CHRONIC DRY EYE/BLEPHARITIS    pravastatin (PRAVACHOL) 40 mg tablet Take 40 mg by mouth    venlafaxine (EFFEXOR-XR) 37.5 mg 24 hr capsule Take 37.5 mg by mouth     No current facility-administered medications on file prior to visit.        Allergies   Allergen Reactions    Ibuprofen Other (See Comments) Physical Exam    /84 (BP Location: Left arm, Patient Position: Sitting, Cuff Size: Adult)   Pulse 75   Temp (!) 97.2 °F (36.2 °C)   Ht 5' 10" (1.778 m)   Wt 94.3 kg (208 lb)   SpO2 96%   BMI 29.84 kg/m²     Constitutional: normal, well developed, well nourished, alert, in no distress and non-toxic and no overt pain behavior. and overweight  Eyes: anicteric  HEENT: grossly intact  Neck: supple, symmetric, trachea midline and no masses   Pulmonary:even and unlabored  Cardiovascular:No edema or pitting edema present  Skin:Normal without rashes or lesions and well hydrated  Psychiatric:Mood and affect appropriate  Neurologic:Cranial Nerves II-XII grossly intact Sensation grossly intact; no clonus negative anders's. Reflexes 2+ and brisk. SLR positive left sided. Musculoskeletal:normal gait. 5/5 strength bilaterally with AROM in all extremities. Difficulty with normal heel toe and tip toe walking. Significant pain with lumbar facet loading bilaterally and with lateral spine rotation, left greater than right. ttp over lumbar paraspinal muscles. Negative basilio's test, negative gaenslen's negative SIJ loading bilaterally.     Imaging    No pertinent imaging to review at this time

## 2023-12-13 NOTE — PATIENT INSTRUCTIONS
Core Strengthening Exercises   WHAT YOU NEED TO KNOW:   Your core includes the muscles of your lower back, hip, pelvis, and abdomen. Core strengthening exercises help heal and strengthen these muscles. This helps prevent another injury, and keeps your pelvis, spine, and hips in the correct position. DISCHARGE INSTRUCTIONS:   Call your doctor or physical therapist if:   You have sharp or worsening pain during exercise or at rest.    You have questions or concerns about your shoulder exercises. Safety tips:  Talk to your healthcare provider before you start an exercise program. A physical therapist can teach you how to do core strengthening exercises safely. Do the exercises on a mat or firm surface. A firm surface will support your spine and prevent low back pain. Do not do these exercises on a bed. Move slowly and smoothly. Avoid fast or jerky motions. Stop if you feel pain. You may feel some discomfort at first, but you should not feel pain. Tell your provider or physical therapist if you have pain while you exercise. Regular exercise will help decrease your discomfort over time. Breathe normally during core exercises. Do not hold your breath. This may cause an increase in blood pressure and prevent muscle strengthening. Your healthcare provider will tell you when to inhale and exhale during the exercise. Begin all of your exercises with abdominal bracing. Abdominal bracing helps warm up your core muscles. You can also practice abdominal bracing throughout the day. Lie on your back with your knees bent and feet flat on the floor. Place your arms in a relaxed position beside your body. Tighten your abdominal muscles. Pull your belly button in and up toward your spine. Hold for 5 seconds. Relax your muscles. Repeat 10 times. Core strengthening exercises: Your healthcare provider will tell you how often to do these exercises.  The provider will also tell you how many repetitions of each exercise you should do. Hold each exercise for 5 seconds or as directed. As you get stronger, increase your hold to 10 to 15 seconds. You can do some of these exercises on a stability ball, or with a weight. Ask your healthcare provider how to use a stability ball or weight for these exercises:  Bridging:  Lie on your back with your knees bent and feet flat on the floor. Rest your arms at your side. Tighten your buttocks, and then lift your hips 1 inch off the floor. Hold for 5 seconds. When you can do this exercise without pain for 10 seconds, increase the distance you lift your hips. A good goal is to be able to lift your hips so that your shoulders, hips, and knees are in a straight line. Dead bug:  Lie on your back with your knees bent and feet flat on the floor. Place your arms in a relaxed position beside your body. Begin with abdominal bracing. Next, raise one leg, keeping your knee bent. Hold for 5 seconds. Repeat with the other leg. When you can do this exercise without pain for 10 to 15 seconds, you may raise one straight leg and hold. Repeat with the other leg. Quadruped:  Place your hands and knees on the floor. Keep your wrists directly below your shoulders and your knees directly below your hips. Pull your belly button in toward your spine. Do not flatten or arch your back. Tighten your abdominal muscles below your belly button. Hold for 5 seconds. When you can do this exercise without pain for 10 to 15 seconds, you may extend one arm and hold. Repeat on the other side. Side bridge exercises:      Standing side bridge:  Stand next to a wall and extend one arm toward the wall. Place your palm flat on the wall with your fingers pointing upward. Begin with abdominal bracing. Next, without moving your feet, slowly bend your arm to 90 degrees. Hold for 5 seconds. Repeat on the other side.  When you can do this exercise without pain for 10 to 15 seconds, you may do the bent leg side bridge on the floor. Bent leg side bridge:  Lie on one side with your legs, hips, and shoulders in a straight line. Prop yourself up onto your forearm so your elbow is directly below your shoulder. Bend your knees back to 90 degrees. Begin with abdominal bracing. Next, lift your hips and balance yourself on your forearm and knees. Hold for 5 seconds. Repeat on the other side. When you can do this exercise without pain for 10 to 15 seconds, you may do the straight leg side bridge on the floor. Straight leg side bridge:  Lie on one side with your legs, hips, and shoulders in a straight line. Prop yourself up onto your forearm so your elbow is directly below your shoulder. Begin with abdominal bracing. Lift your hips off the floor and balance yourself on your forearm and the outside of your flexed foot. Do not let your ankle bend sideways. Hold for 5 seconds. Repeat on the other side. When you can do this exercise without pain for 10 to 15 seconds, ask your healthcare provider for more advanced exercises. Superman:  Lie on your stomach. Extend your arms forward on the floor. Tighten your abdominal muscles and lift your right hand and left leg off the floor. Hold this position. Slowly return to the starting position. Tighten your abdominal muscles and lift your left hand and right leg off the floor. Hold this position. Slowly return to the starting position. Clam:  Lie on your side with your knees bent. Put your bottom arm under your head to keep your neck in line. Put your top hand on your hip to keep your pelvis from moving. Put your heels together, and keep them together during this exercise. Slowly raise your top knee toward the ceiling. Then lower your leg so your knees are together. Repeat this exercise 10 times. Then switch sides and do the exercise 10 times with the other leg. Curl up:  Lie on your back with your knees bent and feet flat on the floor.  Place your hands, palms down, underneath your lower back. Next, with your elbows on the floor, lift your shoulders and chest 2 to 3 inches off the floor. Keep your head in line with your shoulders. Hold this position. Slowly return to the starting position. Straight leg raises:  Lie on your back with one leg straight. Bend the other knee and place your foot flat on the floor. Tighten your abdominal muscles. Keep your leg straight and slowly lift it straight up 6 to 12 inches off the floor. Hold this position. Lower your leg slowly. Do as many repetitions as directed on this side. Repeat with the other leg. Plank:  Lie on your stomach. Bend your elbows and place your forearms flat on the floor. Lift your chest, stomach, and knees off the floor. Make sure your elbows are below your shoulders. Your body should be in a straight line. Do not let your hips or lower back sink to the ground. Squeeze your abdominal muscles together and hold for 15 seconds. To make this exercise harder, hold for 30 seconds or lift 1 leg at a time. Bicycles:  Lie on your back. Bend both knees and bring them toward your chest. Your calves should be parallel to the floor. Place the palms of your hands on the back of your head. Straighten your right leg and keep it lifted 2 inches off the floor. Raise your head and shoulders off the floor and twist towards your left. Keep your head and shoulders lifted. Bend your right knee while you straighten your left leg. Keep your left leg 2 inches off the floor. Twist your head and chest towards the left leg. Continue to straighten 1 leg at a time and twist.       Follow up with your doctor or physical therapist as directed:  Write down your questions so you remember to ask them during your visits. © Copyright [employer] 2023 Information is for End User's use only and may not be sold, redistributed or otherwise used for commercial purposes. The above information is an  only.  It is not intended as medical advice for individual conditions or treatments. Talk to your doctor, nurse or pharmacist before following any medical regimen to see if it is safe and effective for you.

## 2023-12-27 NOTE — DISCHARGE INSTR - AVS FIRST PAGE
YOUR 2 WEEK FOLLOW UP HAS BEEN SCHEDULED; IF YOU WISH TO CHANGE THE FOLLOW UP, PLEASE CALL THE [facility] [place]: [phone]    Epidural Steroid Injection   WHAT YOU NEED TO KNOW:   An epidural steroid injection (LUIS) is a procedure to inject steroid medicine into the epidural space. The epidural space is between your spinal cord and vertebrae. Steroids reduce inflammation and fluid buildup in your spine that may be causing pain. You may be given pain medicine along with the steroids.        DISCHARGE INSTRUCTIONS:   Call your local emergency number (911 in the [place]) if:   You have a seizure.    You have trouble moving your legs.    Seek care immediately if:   Blood soaks through your bandage.    You have a fever or chills, severe back pain, and the procedure area is sensitive to the touch.    You cannot control when you urinate or have a bowel movement.    Call your doctor if:   You have weakness or numbness in your legs.    Your wound is red, swollen, or draining pus.    You have nausea or are vomiting.    Your face or neck is red and you feel warm.    You have more pain than you had before the procedure.    You have swelling in your hands or feet.    You have questions or concerns about your condition or care.    Care for your wound as directed:  You may remove the bandage before you go to bed the day of your procedure. You may take a shower, but do not take a bath for at least 24 hours.   Self-care:   Do not drive,  use machines, or do strenuous activity for 24 hours after your procedure or as directed.     Continue other treatments  as directed. Steroid injections alone will not control your pain. The injections are meant to be used with other treatments, such as physical therapy.    Follow up with your doctor as directed:  Write down your questions so you remember to ask them during your visits.     EPIDURAL STEROID INJECTION DISCHARGE INSTRUCTIONS      ACTIVITY  Do not drive or operate  machinery today.  No strenuous activity today - bending, lifting, etc.   You may resume normal activities starting tomorrow - start slowly and as tolerated.  You may shower today, but not tub baths or hot tubs.  You may have numbness for several hours from the local anesthetics. Please use caution and common sense, especially with weight-bearing activities.    CARE OF THE INJECTION SITE  If you have soreness or pain apply ice to the area today (20 minutes on and 20 minutes off).  Starting tomorrow, you   Notify the [facility] if you have any of the following: redness, drainage, swelling or fever above 100°F.      MEDICATIONS  Continue to take all routine medications.  Our office may have instructed you to hold some medications. You may restart medications, including blood thinners.

## 2023-12-28 ENCOUNTER — APPOINTMENT (OUTPATIENT)
Dept: RADIOLOGY | Facility: HOSPITAL | Age: 63
End: 2023-12-28
Payer: COMMERCIAL

## 2023-12-28 ENCOUNTER — HOSPITAL ENCOUNTER (OUTPATIENT)
Facility: HOSPITAL | Age: 63
Setting detail: OUTPATIENT SURGERY
Discharge: HOME | End: 2023-12-28
Attending: ANESTHESIOLOGY | Admitting: ANESTHESIOLOGY
Payer: COMMERCIAL

## 2023-12-28 VITALS
WEIGHT: 208 LBS | TEMPERATURE: 97.6 F | OXYGEN SATURATION: 97 % | BODY MASS INDEX: 29.78 KG/M2 | RESPIRATION RATE: 20 BRPM | HEART RATE: 57 BPM | SYSTOLIC BLOOD PRESSURE: 125 MMHG | HEIGHT: 70 IN | DIASTOLIC BLOOD PRESSURE: 81 MMHG

## 2023-12-28 PROCEDURE — 64483 NJX AA&/STRD TFRM EPI L/S 1: CPT | Performed by: ANESTHESIOLOGY

## 2023-12-28 PROCEDURE — 64484 NJX AA&/STRD TFRM EPI L/S EA: CPT | Performed by: ANESTHESIOLOGY

## 2023-12-28 RX ORDER — LIDOCAINE HYDROCHLORIDE 10 MG/ML
INJECTION, SOLUTION EPIDURAL; INFILTRATION; INTRACAUDAL; PERINEURAL AS NEEDED
Status: DISCONTINUED | OUTPATIENT
Start: 2023-12-28 | End: 2023-12-28 | Stop reason: HOSPADM

## 2023-12-28 RX ORDER — BETAMETHASONE SODIUM PHOSPHATE AND BETAMETHASONE ACETATE 3; 3 MG/ML; MG/ML
INJECTION, SUSPENSION INTRA-ARTICULAR; INTRALESIONAL; INTRAMUSCULAR; SOFT TISSUE AS NEEDED
Status: DISCONTINUED | OUTPATIENT
Start: 2023-12-28 | End: 2023-12-28 | Stop reason: HOSPADM

## 2023-12-28 NOTE — OP NOTE
OPERATIVE REPORT  PATIENT NAME: Jordi Dias    :  1960  MRN: [id number]  Pt Location:  GI ROOM 01    SURGERY DATE: 2023    Surgeons and Role:     * Melquiades Loving MD - Primary    Preop Diagnosis:  Lumbar spondylosis [M47.816]    Post-Op Diagnosis Codes:     * Lumbar spondylosis [M47.816]    Procedure(s):  Left - left L5 and S1 transforaminal LUIS    Specimen(s):  * No specimens in log *    Estimated Blood Loss:   Minimal    Drains:  * No LDAs found *    Anesthesia Type:   Local    Operative Indications:  Lumbar spondylosis [M47.816]    Operative Findings:  Left L5 and S1 nerve roots identified under fluoroscopic guidance with contrast    Complications:   None    Procedure and Technique:  Please see detailed procedure note    I was present for the entire procedure.    Patient Disposition:  PACU     SIGNATURE: Melquiades Loving MD  DATE: 2023  TIME: 1:12 PM

## 2023-12-28 NOTE — PROCEDURES
Pre-procedure Diagnosis: Lumbar radiculopathy  Post-procedure Diagnosis: Lumbar radiculopathy  Procedure Title(s):  [LEFT L5 and S1 TRANSFORAMINAL EPIDURAL STEROID INJECTION]  Attending Surgeon:   Melquiades Loving MD  Anesthesia:   Local     Indications: The patient is a 63 y.o. year-old male with a diagnosis of Lumbar radiculopathyThe patient's history and physical exam were reviewed. The risks, benefits and alternatives to the procedure were discussed, and all questions were answered to the patient's satisfaction. The patient agreed to proceed, and written informed consent was obtained.    Procedure in Detail: The patient was brought into the procedure room and placed in the prone position on the fluoroscopy table. The area of the lumbar spine was prepped with chloraprep solution  then draped in a sterile manner.    The [L5] vertebral body was identified with AP fluoroscopy. An oblique view to the [LEFT] was obtained to better visualize the inferior junction of the pedicle and transverse process. The 6 o'clock position of the pedicle was marked and identified. The skin and subcutaneous tissues in the area were anesthetized with 1% lidocaine. A 22-gauge, [3.5] inch needle was directed toward the targeted point under fluoroscopy until bone was contacted. The needle was then walked inferiorly until the neural foramen was entered. A lateral fluoroscopic view was then used to place the needle tip at the 10 o'clock position of the foramen.     The [LEFT]  S1 foramen was identified and the 2 o'clock position was marked. The skin and subcutaneous tissues in the area were anesthetized with 1% lidocaine. A 22-gauge,  [3.5] inch needle was directed toward the targeted point under fluoroscopy until bone was contacted. The needle was then walked inferiorly until the neural foramen was entered. A lateral fluoroscopic view was then used to place the needle tip in the middle of the foramen.    Negative aspiration was confirmed,  and 1 ml Omnipaque 240 was injected at each level. Appropriate neurograms were observed under AP fluoroscopy. Digital subtraction angiography was performed showing no vascular uptake and appropriate spread in the epidural space.  Then, after negative aspiration, a solution consisting of [2.5mL] betamethasone (6mg/mL) was easily injected at each level. The needles were removed with a 1% lidocaine flush. The patient's back was cleaned and a bandage was placed over the needle insertion points.     Disposition: The patient tolerated the procedure well, and there were no apparent complications. Strength at baseline when examined in post-op area. The patient was taken to the recovery area where written discharge instructions for the procedure were given.      Estimated Blood Loss: None  Specimens Obtained: N/A

## 2024-01-04 ENCOUNTER — TELEPHONE (OUTPATIENT)
Dept: PAIN MEDICINE | Facility: CLINIC | Age: 64
End: 2024-01-04

## 2024-01-26 ENCOUNTER — TELEPHONE (OUTPATIENT)
Dept: NEUROSURGERY | Facility: CLINIC | Age: 64
End: 2024-01-26

## 2024-01-26 ENCOUNTER — TELEPHONE (OUTPATIENT)
Age: 64
End: 2024-01-26

## 2024-01-26 ENCOUNTER — OFFICE VISIT (OUTPATIENT)
Dept: PAIN MEDICINE | Facility: CLINIC | Age: 64
End: 2024-01-26
Payer: COMMERCIAL

## 2024-01-26 VITALS
OXYGEN SATURATION: 95 % | SYSTOLIC BLOOD PRESSURE: 160 MMHG | WEIGHT: 214.6 LBS | HEART RATE: 59 BPM | DIASTOLIC BLOOD PRESSURE: 86 MMHG | BODY MASS INDEX: 30.79 KG/M2 | TEMPERATURE: 97.5 F

## 2024-01-26 DIAGNOSIS — M54.16 LUMBAR RADICULOPATHY: Primary | ICD-10-CM

## 2024-01-26 PROCEDURE — 99214 OFFICE O/P EST MOD 30 MIN: CPT | Performed by: ANESTHESIOLOGY

## 2024-01-26 NOTE — TELEPHONE ENCOUNTER
Caller: Jordi Dias     Doctor: Dr Loving     Reason for call: Patient calling would like to speak to  please advise     Call back#: [phone]

## 2024-01-26 NOTE — TELEPHONE ENCOUNTER
Preferred phone# [phone]   Workman's Comp or Auto Claim: no  Primary Insurance: [facility]  Secondary Insurance:     Referring Physician:   Referred to:   Second Opinion: no, If yes, provider:   Have you ever been seen at [facility]: no  Original Physician Seen:      Previous spine surgery? NO    Information gathered by: NOAM/FELICITAS  Chief Complaint: BACK/SCIATIC PAIN       Back pain - Yes, left  Radiates to leg - Yes, left  Severity of back pain (1-10, 10 being the worst) 5  Severity of leg pain (1-10, 10 being the worst) 8  Associated numbness - No  Associated weakness - Yes, left  Difficulty walking - yes  Bowel/Bladder trouble - no    Length of time symptoms present: MAY 2023    Allergy to contrast dye - no  Hx of Kidney disease - YES        Pain Medications: NO  Physical Therapy: no          When/ Where: [place] CAN'T REMEMBER NAME OF FACILITY OR DATES.  Epidural Injections: yes        When/ Where: [facility] 12/28/23    Studies done    UP COMING FEB 6TH MRI OF LUMBAR SPINE    MRI L SPINE                                        HAS DISC PER PATIENT DISC WAS NOT ABLE TO GET UPLOADED. HE HAS THE DISC.  Where: [facility]  When:JUNE 2023  Film report: yes    Has patient been diagnosed or treated for Cancer? no

## 2024-01-26 NOTE — PROGRESS NOTES
Assessment  1. Lumbar radiculopathy - Left  -     Ambulatory referral to Neurosurgery; Future  -     MRI lumbar spine wo contrast; Future; Expected date: 01/26/2024  -     Ambulatory referral to Physical Therapy; Future    Greater than 90% relief of radicular pain and improved ability to participate with IADLs after bilateral L3, L4, L5 medial branch blocks #1; however, left sided radicular pain in left L5 and S1 dermatomes has returned with pain limited weakness, numbness and paresthesias. Patient had limited benefit with ILESI at L5-S1 and left L5 and S1 TFESI. Additionally patient was not able to tolerate gabapentin or lyrica due to side effect profile. No recent PT.      Axial low back pain described primarily by arthritic features.  Aching, nagging, indolent, stabbing, throbbing features in axial low back without radicular components.  5/5 strength bilaterally, negative SLR.  Positive facet loading maneuvers in lumbar spine elicited pain, positive tenderness to palpation over lumbar paraspinal muscles.  Findings correlate with prominent lumbar facet arthropathy seen throughout axial low back on x-ray.  Currently he is neurologically intact without gait instability, saddle anesthesia or bowel/bladder abnormality. Risks, benefits alternative to medial branch blocks and subsequent radiofrequency ablation of successful thoroughly discussed with patient.  Handouts provided questions answered to patient satisfaction.    The patient has been experiencing moderate to severe axial spine pain that is causing functional deficit.  The pain has been present for at least 3 months and is not improving with conservative care.  Currently the patient is not experiencing any radicular features nor neurogenic claudication.  Non-facet pathology has been ruled out on clinical evaluation.      Previously reported the following symptomatology:     Left-sided lumbar radicular pain in the L5 dermatomal distribution accompanied by  pain limited weakness numbness and paresthesias.  Patient has not yet participated with PT recently but has in the past. Chronic pain with decreased participation with IADLs over the past 3 months.  Has been taking OTC tylenol in addition to gabapentin and steroids with modest benefit.  5/5 strength bilaterally, positive SLR left-sided. Reflexes 2+.  Additionally there is positive facet loading, left greater than right. Denies any gait instability, saddle anesthesia. No imaging to review.  Risks, benefits alternatives to epidural steroid injections thoroughly discussed with patient.  Handouts provided questions answered to patient's satisfaction.  Lifestyle modifications extensively discussed including diet, exercise and weight loss in addition to core strengthening.  Will proceed with multimodal pain therapy plan as noted below:    Plan  -NSGY eval for possible decompression/operative intervention  -MRI lumbar spine w/o contast; will f/u result with patient  -lyrica 50 mg t.i.d. Ordered for patient; has since weaned given side effect profile. counseled regarding sedative effects of taking this medication and provided up titration calendar.  Counseled not to take medication while driving or operating heavy machinery/using stairs  -script provided for formal physical therapy for left-sided lumbar radiculopathy; Physician directed home exercise plan as per [employer] demonstrated and handouts provided that patient plans to participate with for 1 hour, twice a week for the next 6 weeks.     There are risks associated with opioid medications, including dependence, addiction and tolerance. The patient understands and agrees to use these medications only as prescribed. Potential side effects of the medications include, but are not limited to, constipation, drowsiness, addiction, impaired judgment and risk of fatal overdose if not taken as prescribed. The patient was warned against driving while taking sedation medications or  operating heavy machinery. The patient voiced understanding. Sharing medications is a felony. At this point in time, the patient is showing no signs of addiction, abuse, diversion or suicidal ideation.     [place] Prescription Drug Monitoring Program report was reviewed and was appropriate      Complete risks and benefits including bleeding, infection, tissue reaction, nerve injury and allergic reaction were discussed. The approach was demonstrated using models and literature was provided. Verbal and written consent was obtained.     My impressions and treatment recommendations were discussed in detail with the patient who verbalized understanding and had no further questions.  Discharge instructions were provided. I personally saw and examined the patient and I agree with the above discussed plan of care.    No orders of the defined types were placed in this encounter.      History of Present Illness    Greater than 90% relief of radicular pain and improved ability to participate with IADLs after bilateral L3, L4, L5 medial branch blocks #1; however, left sided radicular pain in left L5 and S1 dermatomes has returned with pain limited weakness, numbness and paresthesias. Patient had limited benefit with ILESI at L5-S1 and left L5 and S1 TFESI. Additionally patient was not able to tolerate gabapentin or lyrica due to side effect profile. No recent PT.     Jordi Dias is a 63 y.o. male  presenting with with a past medical history of chronic low back pain described primarily as arthritic in nature.  He describes 8/10 low back pain that is worse in the mornings and worse at the end of the day.  The pain is characterized by achy, nagging, indolent, crampy, stabbing pain in his axial low back.  The patient describes that the pain is worse with standing for long periods of time on hard surfaces as well as with walking.  The patient is a very active individual and feels as though this pain compromises his  participation with independent activities of daily living. The pain can be debilitating at times and contribute to significant disability, compromising overall activity and independent activities of daily living.  He has tried physical therapy with limited relief of symptoms.  Medications the patient has tried in the past include nsaids, tylenol.  He describes no radicular symptoms and has good strength.  He denies any weakness numbness or paresthesias.    Now reports the following symptomatology:     Previously reported chronic lumbar radicular pain in the left L5 dermatomal distribution since April. Debilitating pain limited weakness numbness and paresthesias accompany the pain. The patient rates the pain at a 8/10 accompanied by electric shock-like shooting features and crampy burning pain in the aforementioned dermatomal distributions.  The pain is worse in the mornings as well as the end of the day; exertion such as walking for long periods of time seems to exacerbate the pain.  The patient can hardly walk more than a few blocks without having debilitating pain.  He tries to maintain an active lifestyle and finds that the current degree of pain seems to compromises his efforts.  The pain significantly impacts independent activities of daily living and contributes to significant disability.  He has attempted physical therapy with exacerbation of the pain.  He has taken naproxen, tylenol as well as gabapentin with limited relief of the pain as well.  He has never tried epidural steroid injections in the past. He denies any bowel or bladder dysfunction/incontinence, saddle anesthesia or gait instability.    I have personally reviewed and/or updated the patient's past medical history, past surgical history, family history, social history, current medications, allergies, and vital signs today.     Review of Systems   Constitutional:  Positive for activity change.   HENT: Negative.     Eyes: Negative.    Respiratory:  Negative.     Cardiovascular: Negative.    Gastrointestinal: Negative.    Endocrine: Negative.    Genitourinary: Negative.    Musculoskeletal:  Positive for arthralgias, back pain, gait problem and myalgias.   Skin: Negative.    Allergic/Immunologic: Negative.    Neurological:  Positive for weakness and numbness.   Hematological: Negative.    Psychiatric/Behavioral: Negative.     All other systems reviewed and are negative.      Patient Active Problem List   Diagnosis    Lumbar spondylosis - Bilateral    Lumbar radiculopathy       Past Medical History:   Diagnosis Date    Hypertension        Past Surgical History:   Procedure Laterality Date    ELBOW SURGERY      EPIDURAL BLOCK INJECTION N/A 7/27/2023    Procedure: L5-S1 ILESI;  Surgeon: Melquiades Loving MD;  Location: [facility];  Service: Pain Management     EPIDURAL BLOCK INJECTION Left 8/22/2023    Procedure: LT L5 & S1 TFESI;  Surgeon: Melquiades Loving MD;  Location: [facility];  Service: Pain Management     EPIDURAL BLOCK INJECTION Left 11/7/2023    Procedure: LT L5 & S1 TFESI;  Surgeon: Melquiades Loving MD;  Location: [facility];  Service: Pain Management     NERVE BLOCK Bilateral 12/5/2023    Procedure: BLOCK MEDIAL BRANCH L3, L4, L5 #1;  Surgeon: Melquiades Loving MD;  Location: [facility];  Service: Pain Management     NERVE BLOCK Left 12/28/2023    Procedure: left L5 and S1 transforaminal LUIS;  Surgeon: Melquiades Loving MD;  Location: [facility];  Service: Pain Management     REPLACEMENT TOTAL KNEE Bilateral        History reviewed. No pertinent family history.    Social History     Occupational History    Not on file   Tobacco Use    Smoking status: Never    Smokeless tobacco: Never   Substance and Sexual Activity    Alcohol use: Yes    Drug use: Never    Sexual activity: Not on file       Current Outpatient Medications on File Prior to Visit   Medication Sig    acetaminophen (TYLENOL) 500 mg tablet Take 2 tablets by mouth in the morning and 2 tablets in the  evening.    Cholecalciferol 25 MCG (1000 UT) tablet Take 1 tablet by mouth every other day    lidocaine (LIDODERM) 5 % Apply 1 patch topically daily Remove & Discard patch within 12 hours or as directed by MD    lisinopril (ZESTRIL) 5 mg tablet Take 1 tablet by mouth every morning    Polyvinyl Alcohol-Povidone PF 1.4-0.6 % SOLN INSTILL 1 DROP OF REFRESH TEARS IN EACH EYE 5 TIMES A DAY AS NEEDED FOR CHRONIC DRY EYE/BLEPHARITIS    pravastatin (PRAVACHOL) 40 mg tablet Take 40 mg by mouth    venlafaxine (EFFEXOR-XR) 37.5 mg 24 hr capsule Take 37.5 mg by mouth     No current facility-administered medications on file prior to visit.       Allergies   Allergen Reactions    Ibuprofen Other (See Comments)         Physical Exam    /86 (BP Location: Left arm, Patient Position: Sitting)   Pulse 59   Temp 97.5 °F (36.4 °C)   Wt 97.3 kg (214 lb 9.6 oz)   SpO2 95%   BMI 30.79 kg/m²     Constitutional: normal, well developed, well nourished, alert, in no distress and non-toxic and no overt pain behavior. and overweight  Eyes: anicteric  HEENT: grossly intact  Neck: supple, symmetric, trachea midline and no masses   Pulmonary:even and unlabored  Cardiovascular:No edema or pitting edema present  Skin:Normal without rashes or lesions and well hydrated  Psychiatric:Mood and affect appropriate  Neurologic:Cranial Nerves II-XII grossly intact Sensation grossly intact; no clonus negative anders's. Reflexes 2+ and brisk. SLR positive left sided. Musculoskeletal:normal gait. 5/5 strength bilaterally with AROM in all extremities. Difficulty with normal heel toe and tip toe walking. Significant pain with lumbar facet loading bilaterally and with lateral spine rotation, left greater than right. ttp over lumbar paraspinal muscles. Negative basilio's test, negative gaenslen's negative SIJ loading bilaterally.    Imaging    No pertinent imaging to review at this time

## 2024-02-05 ENCOUNTER — TELEPHONE (OUTPATIENT)
Dept: FAMILY MEDICINE CLINIC | Facility: CLINIC | Age: 64
End: 2024-02-05

## 2024-02-05 NOTE — LETTER
2/5/2024    Thank you for choosing [facility] in [place] for your health care services. We noticed in our records that you do not have a primary care physician listed. We are reaching out to see if you would be interested in establishing care with any of highly skilled family doctors. There are multiple office locations around [facility] for your convenience.    Our nearby offices are located at:         [address]. Phone [phone] [address]. Phone [phone] [address]. Phone [phone]           [address]. Phone [phone]    We look forward to hearing from you and again thank you for choosing [facility] for your care.

## 2024-02-06 ENCOUNTER — HOSPITAL ENCOUNTER (OUTPATIENT)
Dept: MRI IMAGING | Facility: HOSPITAL | Age: 64
Discharge: HOME | End: 2024-02-06
Attending: ANESTHESIOLOGY
Payer: COMMERCIAL

## 2024-02-06 DIAGNOSIS — M54.16 LUMBAR RADICULOPATHY: ICD-10-CM

## 2024-02-06 PROCEDURE — 72148 MRI LUMBAR SPINE W/O DYE: CPT

## 2024-02-06 PROCEDURE — G1004 CDSM NDSC: HCPCS

## 2024-02-12 ENCOUNTER — PREP FOR PROCEDURE (OUTPATIENT)
Dept: PAIN MEDICINE | Facility: CLINIC | Age: 64
End: 2024-02-12

## 2024-02-12 ENCOUNTER — OFFICE VISIT (OUTPATIENT)
Dept: PAIN MEDICINE | Facility: CLINIC | Age: 64
End: 2024-02-12
Payer: COMMERCIAL

## 2024-02-12 VITALS
OXYGEN SATURATION: 96 % | BODY MASS INDEX: 30.64 KG/M2 | SYSTOLIC BLOOD PRESSURE: 158 MMHG | TEMPERATURE: 97.6 F | DIASTOLIC BLOOD PRESSURE: 98 MMHG | HEIGHT: 70 IN | WEIGHT: 214 LBS | HEART RATE: 54 BPM

## 2024-02-12 DIAGNOSIS — M47.816 LUMBAR SPONDYLOSIS: Primary | ICD-10-CM

## 2024-02-12 DIAGNOSIS — M54.16 LUMBAR RADICULOPATHY: Primary | ICD-10-CM

## 2024-02-12 PROCEDURE — 99214 OFFICE O/P EST MOD 30 MIN: CPT | Performed by: ANESTHESIOLOGY

## 2024-02-12 RX ORDER — LIDOCAINE HYDROCHLORIDE 10 MG/ML
10 INJECTION, SOLUTION EPIDURAL; INFILTRATION; INTRACAUDAL; PERINEURAL ONCE
Status: CANCELLED | OUTPATIENT
Start: 2024-02-12 | End: 2024-02-12

## 2024-02-12 RX ORDER — METHYLPREDNISOLONE ACETATE 80 MG/ML
80 INJECTION, SUSPENSION INTRA-ARTICULAR; INTRALESIONAL; INTRAMUSCULAR; PARENTERAL; SOFT TISSUE ONCE
Status: CANCELLED | OUTPATIENT
Start: 2024-02-12 | End: 2024-02-12

## 2024-02-12 RX ORDER — BUPIVACAINE HCL/PF 2.5 MG/ML
10 VIAL (ML) INJECTION ONCE
Status: CANCELLED | OUTPATIENT
Start: 2024-02-12 | End: 2024-02-12

## 2024-02-12 NOTE — PROGRESS NOTES
Assessment  1. Lumbar spondylosis  -     IR spine and pain procedure; Future; Expected date: 02/26/2024    Greater than 90% relief of radicular pain and improved ability to participate with IADLs after bilateral L3, L4, L5 medial branch blocks #1 and #2; no longer with left sided radicular pain in left L5 and S1 dermatomes or with pain limited weakness, numbness and paresthesias. Patient had limited benefit with ILESI at L5-S1 and left L5 and S1 TFESI. Additionally patient was not able to tolerate gabapentin or lyrica due to side effect profile. No recent PT.      Axial low back pain described primarily by arthritic features.  Aching, nagging, indolent, stabbing, throbbing features in axial low back without radicular components.  5/5 strength bilaterally, negative SLR.  Positive facet loading maneuvers in lumbar spine elicited pain, positive tenderness to palpation over lumbar paraspinal muscles.  Findings correlate with prominent lumbar facet arthropathy seen throughout axial low back on x-ray.  Currently he is neurologically intact without gait instability, saddle anesthesia or bowel/bladder abnormality. Risks, benefits alternative to medial branch blocks and subsequent radiofrequency ablation of successful thoroughly discussed with patient.  Handouts provided questions answered to patient satisfaction.    The patient has been experiencing moderate to severe axial spine pain that is causing functional deficit.  The pain has been present for at least 3 months and is not improving with conservative care.  Currently the patient is not experiencing any radicular features nor neurogenic claudication.  Non-facet pathology has been ruled out on clinical evaluation.      Previously reported the following symptomatology:     Left-sided lumbar radicular pain in the L5 dermatomal distribution accompanied by pain limited weakness numbness and paresthesias.  Patient has not yet participated with PT recently but has in the  past. Chronic pain with decreased participation with IADLs over the past 3 months.  Has been taking OTC tylenol in addition to gabapentin and steroids with modest benefit.  5/5 strength bilaterally, positive SLR left-sided. Reflexes 2+.  Additionally there is positive facet loading, left greater than right. Denies any gait instability, saddle anesthesia. No imaging to review.  Risks, benefits alternatives to epidural steroid injections thoroughly discussed with patient.  Handouts provided questions answered to patient's satisfaction.  Lifestyle modifications extensively discussed including diet, exercise and weight loss in addition to core strengthening.  Will proceed with multimodal pain therapy plan as noted below:    Plan  -bilateral L3, L4, L5 medial branch blocks #2; f/u 2 weeks post procedure  -lyrica 50 mg t.i.d. Ordered for patient; has since weaned given side effect profile. counseled regarding sedative effects of taking this medication and provided up titration calendar.  Counseled not to take medication while driving or operating heavy machinery/using stairs  -script provided for formal physical therapy for left-sided lumbar radiculopathy; Physician directed home exercise plan as per [employer] demonstrated and handouts provided that patient plans to participate with for 1 hour, twice a week for the next 6 weeks.     There are risks associated with opioid medications, including dependence, addiction and tolerance. The patient understands and agrees to use these medications only as prescribed. Potential side effects of the medications include, but are not limited to, constipation, drowsiness, addiction, impaired judgment and risk of fatal overdose if not taken as prescribed. The patient was warned against driving while taking sedation medications or operating heavy machinery. The patient voiced understanding. Sharing medications is a felony. At this point in time, the patient is showing no signs of addiction,  abuse, diversion or suicidal ideation.     [place] Prescription Drug Monitoring Program report was reviewed and was appropriate      Complete risks and benefits including bleeding, infection, tissue reaction, nerve injury and allergic reaction were discussed. The approach was demonstrated using models and literature was provided. Verbal and written consent was obtained.     My impressions and treatment recommendations were discussed in detail with the patient who verbalized understanding and had no further questions.  Discharge instructions were provided. I personally saw and examined the patient and I agree with the above discussed plan of care.    No orders of the defined types were placed in this encounter.      History of Present Illness    Greater than 90% relief of radicular pain and improved ability to participate with IADLs after bilateral L3, L4, L5 medial branch blocks #1 and #2; no longer with left sided radicular pain in left L5 and S1 dermatomes or with pain limited weakness, numbness and paresthesias. Patient had limited benefit with ILESI at L5-S1 and left L5 and S1 TFESI. Additionally patient was not able to tolerate gabapentin or lyrica due to side effect profile. No recent PT.      Jordi Dias is a 63 y.o. male  presenting with with a past medical history of chronic low back pain described primarily as arthritic in nature.  He describes 8/10 low back pain that is worse in the mornings and worse at the end of the day.  The pain is characterized by achy, nagging, indolent, crampy, stabbing pain in his axial low back.  The patient describes that the pain is worse with standing for long periods of time on hard surfaces as well as with walking.  The patient is a very active individual and feels as though this pain compromises his participation with independent activities of daily living. The pain can be debilitating at times and contribute to significant disability, compromising overall  activity and independent activities of daily living.  He has tried physical therapy with limited relief of symptoms.  Medications the patient has tried in the past include nsaids, tylenol.  He describes no radicular symptoms and has good strength.  He denies any weakness numbness or paresthesias.    Now reports the following symptomatology:     Previously reported chronic lumbar radicular pain in the left L5 dermatomal distribution since April. Debilitating pain limited weakness numbness and paresthesias accompany the pain. The patient rates the pain at a 8/10 accompanied by electric shock-like shooting features and crampy burning pain in the aforementioned dermatomal distributions.  The pain is worse in the mornings as well as the end of the day; exertion such as walking for long periods of time seems to exacerbate the pain.  The patient can hardly walk more than a few blocks without having debilitating pain.  He tries to maintain an active lifestyle and finds that the current degree of pain seems to compromises his efforts.  The pain significantly impacts independent activities of daily living and contributes to significant disability.  He has attempted physical therapy with exacerbation of the pain.  He has taken naproxen, tylenol as well as gabapentin with limited relief of the pain as well.  He has never tried epidural steroid injections in the past. He denies any bowel or bladder dysfunction/incontinence, saddle anesthesia or gait instability.    I have personally reviewed and/or updated the patient's past medical history, past surgical history, family history, social history, current medications, allergies, and vital signs today.     Review of Systems   Constitutional:  Positive for activity change.   HENT: Negative.     Eyes: Negative.    Respiratory: Negative.     Cardiovascular: Negative.    Gastrointestinal: Negative.    Endocrine: Negative.    Genitourinary: Negative.    Musculoskeletal:  Positive for  arthralgias, back pain, gait problem and myalgias.   Skin: Negative.    Allergic/Immunologic: Negative.    Neurological:  Positive for weakness and numbness.   Hematological: Negative.    Psychiatric/Behavioral: Negative.     All other systems reviewed and are negative.      Patient Active Problem List   Diagnosis    Lumbar spondylosis - Bilateral    Lumbar radiculopathy       Past Medical History:   Diagnosis Date    Hypertension        Past Surgical History:   Procedure Laterality Date    ELBOW SURGERY      EPIDURAL BLOCK INJECTION N/A 7/27/2023    Procedure: L5-S1 ILESI;  Surgeon: Melquiades Loving MD;  Location: [facility];  Service: Pain Management     EPIDURAL BLOCK INJECTION Left 8/22/2023    Procedure: LT L5 & S1 TFESI;  Surgeon: Melquiades Loving MD;  Location: [facility];  Service: Pain Management     EPIDURAL BLOCK INJECTION Left 11/7/2023    Procedure: LT L5 & S1 TFESI;  Surgeon: Melquiades Loving MD;  Location: [facility];  Service: Pain Management     NERVE BLOCK Bilateral 12/5/2023    Procedure: BLOCK MEDIAL BRANCH L3, L4, L5 #1;  Surgeon: Melquiades Loving MD;  Location: [facility];  Service: Pain Management     NERVE BLOCK Left 12/28/2023    Procedure: left L5 and S1 transforaminal LUIS;  Surgeon: Melquiades Loving MD;  Location: [facility];  Service: Pain Management     REPLACEMENT TOTAL KNEE Bilateral        History reviewed. No pertinent family history.    Social History     Occupational History    Not on file   Tobacco Use    Smoking status: Never    Smokeless tobacco: Never   Substance and Sexual Activity    Alcohol use: Yes    Drug use: Never    Sexual activity: Not on file       Current Outpatient Medications on File Prior to Visit   Medication Sig    acetaminophen (TYLENOL) 500 mg tablet Take 2 tablets by mouth in the morning and 2 tablets in the evening.    Cholecalciferol 25 MCG (1000 UT) tablet Take 1 tablet by mouth every other day    lidocaine (LIDODERM) 5 % Apply 1 patch topically daily Remove &  "Discard patch within 12 hours or as directed by MD    lisinopril (ZESTRIL) 5 mg tablet Take 1 tablet by mouth every morning    Polyvinyl Alcohol-Povidone PF 1.4-0.6 % SOLN INSTILL 1 DROP OF REFRESH TEARS IN EACH EYE 5 TIMES A DAY AS NEEDED FOR CHRONIC DRY EYE/BLEPHARITIS    pravastatin (PRAVACHOL) 40 mg tablet Take 40 mg by mouth    venlafaxine (EFFEXOR-XR) 37.5 mg 24 hr capsule Take 37.5 mg by mouth     No current facility-administered medications on file prior to visit.       Allergies   Allergen Reactions    Ibuprofen Other (See Comments)         Physical Exam    /98 (BP Location: Left arm, Patient Position: Sitting, Cuff Size: Adult)   Pulse (!) 54   Temp 97.6 °F (36.4 °C)   Ht 5' 10\" (1.778 m)   Wt 97.1 kg (214 lb)   SpO2 96%   BMI 30.71 kg/m²     Constitutional: normal, well developed, well nourished, alert, in no distress and non-toxic and no overt pain behavior. and overweight  Eyes: anicteric  HEENT: grossly intact  Neck: supple, symmetric, trachea midline and no masses   Pulmonary:even and unlabored  Cardiovascular:No edema or pitting edema present  Skin:Normal without rashes or lesions and well hydrated  Psychiatric:Mood and affect appropriate  Neurologic:Cranial Nerves II-XII grossly intact Sensation grossly intact; no clonus negative anders's. Reflexes 2+ and brisk. SLR positive left sided. Musculoskeletal:normal gait. 5/5 strength bilaterally with AROM in all extremities. Difficulty with normal heel toe and tip toe walking. Significant pain with lumbar facet loading bilaterally and with lateral spine rotation, left greater than right. ttp over lumbar paraspinal muscles. Negative basilio's test, negative gaenslen's negative SIJ loading bilaterally.    Imaging    MRI LUMBAR SPINE WITHOUT CONTRAST     INDICATION: M54.16: Radiculopathy, lumbar region.     COMPARISON:  None.     TECHNIQUE:  Multiplanar, multisequence imaging of the lumbar spine was performed. .        IMAGE QUALITY:  Diagnostic   "   FINDINGS:     VERTEBRAL BODIES:  There are 5 lumbar type vertebral bodies.  Normal alignment of the lumbar spine.  No spondylolysis or spondylolisthesis. No scoliosis.  No compression fracture.    Normal marrow signal is identified within the visualized bony   structures.  No discrete marrow lesion.     SACRUM:  Normal signal within the sacrum. No evidence of insufficiency or stress fracture.     DISTAL CORD AND CONUS:  Normal size and signal within the distal cord and conus.     PARASPINAL SOFT TISSUES:  Paraspinal soft tissues are unremarkable.     LOWER THORACIC DISC SPACES:  Normal disc height and signal.  No disc herniation, canal stenosis or foraminal narrowing.     LUMBAR DISC SPACES:     L1-L2:  Normal.     L2-L3: There is disc degeneration and narrowing. There is a bulging annulus. There is facet arthrosis. There is mild mass effect on the thecal sac. There is mild right greater than left foraminal narrowing.     L3-L4: There is a bulging annulus. There is facet arthrosis. There is minimal mass effect on the thecal sac. There is mild foraminal narrowing.     L4-L5: There is a bulging annulus, asymmetric to the right. There is facet arthrosis. There is marginal osteophytosis. There is mild mass effect on the thecal sac. There is mild right greater left foraminal narrowing. Probable Schmorl's node along the   inferior endplate of L4.     L5-S1: Bulging annulus. Facet arthrosis. Moderate bilateral foraminal narrowing with contact of the exiting nerve roots. No significant canal stenosis.     OTHER FINDINGS:  None.     IMPRESSION:     Degenerative changes of the lumbar spine, as described above.     Moderate bilateral foraminal narrowing is present at L5-S1 with contact of the exiting nerve roots.        Workstation performed: [id number]

## 2024-02-12 NOTE — H&P (VIEW-ONLY)
Assessment  1. Lumbar spondylosis  -     IR spine and pain procedure; Future; Expected date: 02/26/2024    Greater than 90% relief of radicular pain and improved ability to participate with IADLs after bilateral L3, L4, L5 medial branch blocks #1 and #2; no longer with left sided radicular pain in left L5 and S1 dermatomes or with pain limited weakness, numbness and paresthesias. Patient had limited benefit with ILESI at L5-S1 and left L5 and S1 TFESI. Additionally patient was not able to tolerate gabapentin or lyrica due to side effect profile. No recent PT.      Axial low back pain described primarily by arthritic features.  Aching, nagging, indolent, stabbing, throbbing features in axial low back without radicular components.  5/5 strength bilaterally, negative SLR.  Positive facet loading maneuvers in lumbar spine elicited pain, positive tenderness to palpation over lumbar paraspinal muscles.  Findings correlate with prominent lumbar facet arthropathy seen throughout axial low back on x-ray.  Currently he is neurologically intact without gait instability, saddle anesthesia or bowel/bladder abnormality. Risks, benefits alternative to medial branch blocks and subsequent radiofrequency ablation of successful thoroughly discussed with patient.  Handouts provided questions answered to patient satisfaction.    The patient has been experiencing moderate to severe axial spine pain that is causing functional deficit.  The pain has been present for at least 3 months and is not improving with conservative care.  Currently the patient is not experiencing any radicular features nor neurogenic claudication.  Non-facet pathology has been ruled out on clinical evaluation.      Previously reported the following symptomatology:     Left-sided lumbar radicular pain in the L5 dermatomal distribution accompanied by pain limited weakness numbness and paresthesias.  Patient has not yet participated with PT recently but has in the  past. Chronic pain with decreased participation with IADLs over the past 3 months.  Has been taking OTC tylenol in addition to gabapentin and steroids with modest benefit.  5/5 strength bilaterally, positive SLR left-sided. Reflexes 2+.  Additionally there is positive facet loading, left greater than right. Denies any gait instability, saddle anesthesia. No imaging to review.  Risks, benefits alternatives to epidural steroid injections thoroughly discussed with patient.  Handouts provided questions answered to patient's satisfaction.  Lifestyle modifications extensively discussed including diet, exercise and weight loss in addition to core strengthening.  Will proceed with multimodal pain therapy plan as noted below:    Plan  -bilateral L3, L4, L5 medial branch blocks #2; f/u 2 weeks post procedure  -lyrica 50 mg t.i.d. Ordered for patient; has since weaned given side effect profile. counseled regarding sedative effects of taking this medication and provided up titration calendar.  Counseled not to take medication while driving or operating heavy machinery/using stairs  -script provided for formal physical therapy for left-sided lumbar radiculopathy; Physician directed home exercise plan as per [employer] demonstrated and handouts provided that patient plans to participate with for 1 hour, twice a week for the next 6 weeks.     There are risks associated with opioid medications, including dependence, addiction and tolerance. The patient understands and agrees to use these medications only as prescribed. Potential side effects of the medications include, but are not limited to, constipation, drowsiness, addiction, impaired judgment and risk of fatal overdose if not taken as prescribed. The patient was warned against driving while taking sedation medications or operating heavy machinery. The patient voiced understanding. Sharing medications is a felony. At this point in time, the patient is showing no signs of addiction,  abuse, diversion or suicidal ideation.     [place] Prescription Drug Monitoring Program report was reviewed and was appropriate      Complete risks and benefits including bleeding, infection, tissue reaction, nerve injury and allergic reaction were discussed. The approach was demonstrated using models and literature was provided. Verbal and written consent was obtained.     My impressions and treatment recommendations were discussed in detail with the patient who verbalized understanding and had no further questions.  Discharge instructions were provided. I personally saw and examined the patient and I agree with the above discussed plan of care.    No orders of the defined types were placed in this encounter.      History of Present Illness    Greater than 90% relief of radicular pain and improved ability to participate with IADLs after bilateral L3, L4, L5 medial branch blocks #1 and #2; no longer with left sided radicular pain in left L5 and S1 dermatomes or with pain limited weakness, numbness and paresthesias. Patient had limited benefit with ILESI at L5-S1 and left L5 and S1 TFESI. Additionally patient was not able to tolerate gabapentin or lyrica due to side effect profile. No recent PT.      Jordi Dias is a 63 y.o. male  presenting with with a past medical history of chronic low back pain described primarily as arthritic in nature.  He describes 8/10 low back pain that is worse in the mornings and worse at the end of the day.  The pain is characterized by achy, nagging, indolent, crampy, stabbing pain in his axial low back.  The patient describes that the pain is worse with standing for long periods of time on hard surfaces as well as with walking.  The patient is a very active individual and feels as though this pain compromises his participation with independent activities of daily living. The pain can be debilitating at times and contribute to significant disability, compromising overall  activity and independent activities of daily living.  He has tried physical therapy with limited relief of symptoms.  Medications the patient has tried in the past include nsaids, tylenol.  He describes no radicular symptoms and has good strength.  He denies any weakness numbness or paresthesias.    Now reports the following symptomatology:     Previously reported chronic lumbar radicular pain in the left L5 dermatomal distribution since April. Debilitating pain limited weakness numbness and paresthesias accompany the pain. The patient rates the pain at a 8/10 accompanied by electric shock-like shooting features and crampy burning pain in the aforementioned dermatomal distributions.  The pain is worse in the mornings as well as the end of the day; exertion such as walking for long periods of time seems to exacerbate the pain.  The patient can hardly walk more than a few blocks without having debilitating pain.  He tries to maintain an active lifestyle and finds that the current degree of pain seems to compromises his efforts.  The pain significantly impacts independent activities of daily living and contributes to significant disability.  He has attempted physical therapy with exacerbation of the pain.  He has taken naproxen, tylenol as well as gabapentin with limited relief of the pain as well.  He has never tried epidural steroid injections in the past. He denies any bowel or bladder dysfunction/incontinence, saddle anesthesia or gait instability.    I have personally reviewed and/or updated the patient's past medical history, past surgical history, family history, social history, current medications, allergies, and vital signs today.     Review of Systems   Constitutional:  Positive for activity change.   HENT: Negative.     Eyes: Negative.    Respiratory: Negative.     Cardiovascular: Negative.    Gastrointestinal: Negative.    Endocrine: Negative.    Genitourinary: Negative.    Musculoskeletal:  Positive for  arthralgias, back pain, gait problem and myalgias.   Skin: Negative.    Allergic/Immunologic: Negative.    Neurological:  Positive for weakness and numbness.   Hematological: Negative.    Psychiatric/Behavioral: Negative.     All other systems reviewed and are negative.      Patient Active Problem List   Diagnosis    Lumbar spondylosis - Bilateral    Lumbar radiculopathy       Past Medical History:   Diagnosis Date    Hypertension        Past Surgical History:   Procedure Laterality Date    ELBOW SURGERY      EPIDURAL BLOCK INJECTION N/A 7/27/2023    Procedure: L5-S1 ILESI;  Surgeon: Melquiades Loving MD;  Location: [facility];  Service: Pain Management     EPIDURAL BLOCK INJECTION Left 8/22/2023    Procedure: LT L5 & S1 TFESI;  Surgeon: Melquiades Loving MD;  Location: [facility];  Service: Pain Management     EPIDURAL BLOCK INJECTION Left 11/7/2023    Procedure: LT L5 & S1 TFESI;  Surgeon: Melquiades Loving MD;  Location: [facility];  Service: Pain Management     NERVE BLOCK Bilateral 12/5/2023    Procedure: BLOCK MEDIAL BRANCH L3, L4, L5 #1;  Surgeon: Melquiades Loving MD;  Location: [facility];  Service: Pain Management     NERVE BLOCK Left 12/28/2023    Procedure: left L5 and S1 transforaminal LUIS;  Surgeon: Melquiades Loving MD;  Location: [facility];  Service: Pain Management     REPLACEMENT TOTAL KNEE Bilateral        History reviewed. No pertinent family history.    Social History     Occupational History    Not on file   Tobacco Use    Smoking status: Never    Smokeless tobacco: Never   Substance and Sexual Activity    Alcohol use: Yes    Drug use: Never    Sexual activity: Not on file       Current Outpatient Medications on File Prior to Visit   Medication Sig    acetaminophen (TYLENOL) 500 mg tablet Take 2 tablets by mouth in the morning and 2 tablets in the evening.    Cholecalciferol 25 MCG (1000 UT) tablet Take 1 tablet by mouth every other day    lidocaine (LIDODERM) 5 % Apply 1 patch topically daily Remove &  "Discard patch within 12 hours or as directed by MD    lisinopril (ZESTRIL) 5 mg tablet Take 1 tablet by mouth every morning    Polyvinyl Alcohol-Povidone PF 1.4-0.6 % SOLN INSTILL 1 DROP OF REFRESH TEARS IN EACH EYE 5 TIMES A DAY AS NEEDED FOR CHRONIC DRY EYE/BLEPHARITIS    pravastatin (PRAVACHOL) 40 mg tablet Take 40 mg by mouth    venlafaxine (EFFEXOR-XR) 37.5 mg 24 hr capsule Take 37.5 mg by mouth     No current facility-administered medications on file prior to visit.       Allergies   Allergen Reactions    Ibuprofen Other (See Comments)         Physical Exam    /98 (BP Location: Left arm, Patient Position: Sitting, Cuff Size: Adult)   Pulse (!) 54   Temp 97.6 °F (36.4 °C)   Ht 5' 10\" (1.778 m)   Wt 97.1 kg (214 lb)   SpO2 96%   BMI 30.71 kg/m²     Constitutional: normal, well developed, well nourished, alert, in no distress and non-toxic and no overt pain behavior. and overweight  Eyes: anicteric  HEENT: grossly intact  Neck: supple, symmetric, trachea midline and no masses   Pulmonary:even and unlabored  Cardiovascular:No edema or pitting edema present  Skin:Normal without rashes or lesions and well hydrated  Psychiatric:Mood and affect appropriate  Neurologic:Cranial Nerves II-XII grossly intact Sensation grossly intact; no clonus negative anders's. Reflexes 2+ and brisk. SLR positive left sided. Musculoskeletal:normal gait. 5/5 strength bilaterally with AROM in all extremities. Difficulty with normal heel toe and tip toe walking. Significant pain with lumbar facet loading bilaterally and with lateral spine rotation, left greater than right. ttp over lumbar paraspinal muscles. Negative basilio's test, negative gaenslen's negative SIJ loading bilaterally.    Imaging    MRI LUMBAR SPINE WITHOUT CONTRAST     INDICATION: M54.16: Radiculopathy, lumbar region.     COMPARISON:  None.     TECHNIQUE:  Multiplanar, multisequence imaging of the lumbar spine was performed. .        IMAGE QUALITY:  Diagnostic   "   FINDINGS:     VERTEBRAL BODIES:  There are 5 lumbar type vertebral bodies.  Normal alignment of the lumbar spine.  No spondylolysis or spondylolisthesis. No scoliosis.  No compression fracture.    Normal marrow signal is identified within the visualized bony   structures.  No discrete marrow lesion.     SACRUM:  Normal signal within the sacrum. No evidence of insufficiency or stress fracture.     DISTAL CORD AND CONUS:  Normal size and signal within the distal cord and conus.     PARASPINAL SOFT TISSUES:  Paraspinal soft tissues are unremarkable.     LOWER THORACIC DISC SPACES:  Normal disc height and signal.  No disc herniation, canal stenosis or foraminal narrowing.     LUMBAR DISC SPACES:     L1-L2:  Normal.     L2-L3: There is disc degeneration and narrowing. There is a bulging annulus. There is facet arthrosis. There is mild mass effect on the thecal sac. There is mild right greater than left foraminal narrowing.     L3-L4: There is a bulging annulus. There is facet arthrosis. There is minimal mass effect on the thecal sac. There is mild foraminal narrowing.     L4-L5: There is a bulging annulus, asymmetric to the right. There is facet arthrosis. There is marginal osteophytosis. There is mild mass effect on the thecal sac. There is mild right greater left foraminal narrowing. Probable Schmorl's node along the   inferior endplate of L4.     L5-S1: Bulging annulus. Facet arthrosis. Moderate bilateral foraminal narrowing with contact of the exiting nerve roots. No significant canal stenosis.     OTHER FINDINGS:  None.     IMPRESSION:     Degenerative changes of the lumbar spine, as described above.     Moderate bilateral foraminal narrowing is present at L5-S1 with contact of the exiting nerve roots.        Workstation performed: [id number]

## 2024-02-14 ENCOUNTER — TELEPHONE (OUTPATIENT)
Dept: NEUROSURGERY | Facility: CLINIC | Age: 64
End: 2024-02-14

## 2024-02-14 NOTE — TELEPHONE ENCOUNTER
I received Disc for Jordi Dias, I attempt to download it and I took it to Radiology and wasn't successful. The disk will be in the basket.

## 2024-02-15 ENCOUNTER — TELEPHONE (OUTPATIENT)
Dept: NEUROSURGERY | Facility: CLINIC | Age: 64
End: 2024-02-15

## 2024-02-15 NOTE — TELEPHONE ENCOUNTER
Called pt left VM for return call to begin intake    MRI  2/6/24  LUIS 7/27/23,8/22/23, 11/7/23, 12/5/23 and 12/28/23

## 2024-02-21 ENCOUNTER — HOSPITAL ENCOUNTER (OUTPATIENT)
Facility: HOSPITAL | Age: 64
Setting detail: OUTPATIENT SURGERY
Discharge: HOME | End: 2024-02-21
Attending: ANESTHESIOLOGY | Admitting: ANESTHESIOLOGY
Payer: COMMERCIAL

## 2024-02-21 ENCOUNTER — APPOINTMENT (OUTPATIENT)
Dept: RADIOLOGY | Facility: HOSPITAL | Age: 64
End: 2024-02-21
Payer: COMMERCIAL

## 2024-02-21 VITALS
BODY MASS INDEX: 30.64 KG/M2 | OXYGEN SATURATION: 97 % | SYSTOLIC BLOOD PRESSURE: 117 MMHG | HEART RATE: 51 BPM | DIASTOLIC BLOOD PRESSURE: 77 MMHG | WEIGHT: 214 LBS | RESPIRATION RATE: 18 BRPM | HEIGHT: 70 IN | TEMPERATURE: 97.6 F

## 2024-02-21 PROCEDURE — 64494 INJ PARAVERT F JNT L/S 2 LEV: CPT | Performed by: ANESTHESIOLOGY

## 2024-02-21 PROCEDURE — 64493 INJ PARAVERT F JNT L/S 1 LEV: CPT | Performed by: ANESTHESIOLOGY

## 2024-02-21 RX ORDER — BUPIVACAINE HYDROCHLORIDE 2.5 MG/ML
INJECTION, SOLUTION EPIDURAL; INFILTRATION; INTRACAUDAL AS NEEDED
Status: DISCONTINUED | OUTPATIENT
Start: 2024-02-21 | End: 2024-02-21 | Stop reason: HOSPADM

## 2024-02-21 RX ORDER — METHYLPREDNISOLONE ACETATE 80 MG/ML
INJECTION, SUSPENSION INTRA-ARTICULAR; INTRALESIONAL; INTRAMUSCULAR; SOFT TISSUE AS NEEDED
Status: DISCONTINUED | OUTPATIENT
Start: 2024-02-21 | End: 2024-02-21 | Stop reason: HOSPADM

## 2024-02-21 RX ORDER — LIDOCAINE HYDROCHLORIDE 10 MG/ML
INJECTION, SOLUTION EPIDURAL; INFILTRATION; INTRACAUDAL; PERINEURAL AS NEEDED
Status: DISCONTINUED | OUTPATIENT
Start: 2024-02-21 | End: 2024-02-21 | Stop reason: HOSPADM

## 2024-02-21 NOTE — OP NOTE
OPERATIVE REPORT  PATIENT NAME: Jordi Dias    :  1960  MRN: [id number]  Pt Location:  GI ROOM 01    SURGERY DATE: 2024    Surgeons and Role:     * Melquiades Loving MD - Primary    Preop Diagnosis:  Lumbar spondylosis [M47.816]    Post-Op Diagnosis Codes:     * Lumbar spondylosis [M47.816]    Procedure(s):  Bilateral - B/L L3-L5 MBB #2    Specimen(s):  * No specimens in log *    Estimated Blood Loss:   Minimal    Drains:  * No LDAs found *    Anesthesia Type:   Local    Operative Indications:  Lumbar spondylosis [M47.816]    Operative Findings:  Bilateral L3, L4, L5 medial branch nerve regions identified under fluoroscopic guidance.     Complications:   None    Procedure and Technique:  Please see detailed procedure note    I was present for the entire procedure.    Patient Disposition:  PACU     SIGNATURE: Melquiades Loving MD  DATE: 2024  TIME: 11:05 AM

## 2024-02-21 NOTE — INTERVAL H&P NOTE
H&P reviewed. After examining the patient I find no changes in the patients condition since the H&P had been written.    Vitals:    02/21/24 1033   BP: 130/76   Pulse: 71   Resp: 18   Temp: 97.7 °F (36.5 °C)   SpO2: 97%

## 2024-02-21 NOTE — DISCHARGE INSTR - AVS FIRST PAGE
YOUR 2 WEEK FOLLOW UP HAS BEEN SCHEDULED; IF YOU WISH TO CHANGE THE FOLLOW UP, PLEASE CALL THE [facility] [place]: [phone]    MEDIAL BRANCH BLOCK DISCHARGE INSTRUCTIONS      ACTIVITY  Please do activities that will bring the normal pain that we are rating. For example, if vacuuming or walking increases the pain, then do them. This will give the most accurate response to the diary.  You may shower, but no tub baths today, or applied heat.    CARE OF THE INJECTION SITE  This area may be numb for several hours after the injection.  Notify the [facility] if you have any of the following: redness, drainage, swelling or fever above 100°F.    SPECIAL INSTRUCTIONS  Please return the MBB diary to our office by mail, fax, or drop it off.    MEDICATIONS  Please do not take any break through or short acting pain medications for 8 hours after the block.  Continue to take all routine medications.  Our office may have instructed you to hold some medications. You may resume these medications now.      If you have any problems specifically related to your procedure, please call our office at [phone]. Problems not related to your procedure should be directed at your primary care physician.    Lumbar Radiofrequency Ablation   WHAT YOU NEED TO KNOW:   What do I need to know about lumbar radiofrequency ablation?  Lumbar radiofrequency ablation (RFA) is a procedure used to treat facet joint pain in your lower back. Facet joints are found at the back of each vertebra. A needle electrode is used to send electrical currents to the nerves in your facet joint. The electrical currents create heat that damages the nerve so it cannot send pain signals.   How do I prepare for lumbar RFA?  Your healthcare provider will talk to you about how to prepare for this procedure. You may be told to not to eat or drink anything after midnight on the day of your procedure. Your provider will tell you what medicines  to take or not take on the day of your procedure.  What will happen during lumbar RFA?   You will lie on your stomach. You will be given local anesthesia to numb the area of your back where the needle electrode will be inserted. You may be given a sedative to help keep you relaxed. You may still feel pressure or pushing during the procedure, but you should not feel any pain. Your healthcare provider will use fluoroscopy (a type of x-ray) to guide the needle electrode to the nerves near your facet joint.     Your healthcare provider may touch the affected nerve to make sure the needle electrode is in the right place. You will feel tingling or pressure when your provider does this. Your provider will then apply local anesthesia to the nerve to numb it. This will prevent you from feeling pain when your provider applies heat to the nerve. Your provider will then apply heat to the nerve using the needle electrode. Your provider may need to apply heat to more than one nerve. Your provider will remove the needle electrode and apply a bandage over the area.    What are the risks of lumbar RFA?  You may have pain, numbness, tingling, or burning in the area where the lumbar RFA was done. These normally go away within 6 weeks. The needle electrode may injure your spinal nerves. This may cause permanent leg weakness or nerve pain.  CARE AGREEMENT:   You have the right to help plan your care. Learn about your health condition and how it may be treated. Discuss treatment options with your healthcare providers to decide what care you want to receive. You always have the right to refuse treatment. The above information is an  only. It is not intended as medical advice for individual conditions or treatments. Talk to your doctor, nurse or pharmacist before following any medical regimen to see if it is safe and effective for you.  © Copyright [employer] 2023 Information is for End User's use only and may not be sold,  redistributed or otherwise used for commercial purposes.

## 2024-02-21 NOTE — PROCEDURES
Pre-procedure Diagnosis: Lumbar Facet Arthropathy  Post-procedure Diagnosis: Lumbar Facet Arthropathy  Procedure Title(s):  [BILATERAL L3, L4, L5] medial branch nerve blocks [(CONFIRMATORY)]  Attending Surgeon:   Melquiades Loving MD  Anesthesia:   Local     Indications: The patient is a 63 y.o. year-old male with a diagnosis of lumbar facet arthropathy. The patient's history and physical exam were reviewed. The risks, benefits and alternatives to the procedure were discussed, and all questions were answered to the patient's satisfaction. The patient agreed to proceed, and written informed consent was obtained.    Procedure in Detail: The patient was brought into the procedure room and placed in the prone position on the fluoroscopy table. The area of the lumbar spine was prepped with chloraprep and then draped in a sterile manner.      AP fluoroscopy was used to identify the [L3-L5] levels on the [LEFT] side. The C-arm was obliqued to visualize the junction of the superior articulate process and transverse process.  The sacral ala was identified and marked. The skin in these identified areas was anesthetized with 1% lidocaine. A 22-gauge, 3½-inch spinal needle was advanced toward each of these points under fluoroscopic guidance. Once bone was contacted, negative aspiration was confirmed and [1-mL] of a [6mL]mixture of [5mL] [0.25% bupivicaine] and 1mL of 80mg/mL Depomedrol was injected at each level.    (The same procedure was performed on the RIGHT side.)    After the procedure was completed, the patient's back was cleaned and bandages were placed at the needle insertion sites.    Disposition: The patient tolerated the procedure well, and there were no apparent complications. The patient was taken to the recovery area where written discharge instructions for the procedure were given. The patient was given a pain diary to determine if the patient's pain improves following the injection. Once the diary is returned  we will consider next appropriate course of treatment.    Postoperative pain relief [WAS] significant    Estimated Blood Loss: None  Specimens Obtained: N/A

## 2024-03-06 ENCOUNTER — TELEPHONE (OUTPATIENT)
Dept: NEUROSURGERY | Facility: CLINIC | Age: 64
End: 2024-03-06

## 2024-03-06 ENCOUNTER — OFFICE VISIT (OUTPATIENT)
Dept: NEUROSURGERY | Facility: CLINIC | Age: 64
End: 2024-03-06
Payer: COMMERCIAL

## 2024-03-06 VITALS
OXYGEN SATURATION: 97 % | SYSTOLIC BLOOD PRESSURE: 124 MMHG | HEIGHT: 70 IN | TEMPERATURE: 97.9 F | DIASTOLIC BLOOD PRESSURE: 82 MMHG | BODY MASS INDEX: 29.63 KG/M2 | HEART RATE: 55 BPM | RESPIRATION RATE: 18 BRPM | WEIGHT: 207 LBS

## 2024-03-06 DIAGNOSIS — M54.16 LUMBAR RADICULOPATHY: ICD-10-CM

## 2024-03-06 PROCEDURE — 99204 OFFICE O/P NEW MOD 45 MIN: CPT | Performed by: STUDENT IN AN ORGANIZED HEALTH CARE EDUCATION/TRAINING PROGRAM

## 2024-03-06 NOTE — PROGRESS NOTES
Neurosurgery Office Note  Jordi Dias 64 y.o. male MRN: [id number]      Assessment/Plan     Lumbar radiculopathy  New evaluation of low back pain with left lower extremity pain  Back pain for years with LLE pain since 5/2023. Leg pain is worse than the back. Both are constant, worse with standing or walking. Left-sided low back pain radiates down the buttock and hip to lateral thigh and posterolateral calf, sparing the foot. No RLE complaints or BBI.  Did PT in [place] summer 2023  Previously underwent bilateral L3-5 MBB x 2, left L5-S1, S1 TFESI x 4. Takes Tylenol and uses lidocaine patches  Exam: Midline low back TTP, pain with extension and left-sided rotation, LINDSEY+ bilaterally. RLE 5/5, LLE 5/5 except KF4/5 due to pain. Diminished LT to left lateral thigh and posterior lateral calf, 2+ DTRs, no clonus    Imaging:  MRI lumbar 2/6/24: Degenerative changes of the lumbar spine, as described above. Moderate bilateral foraminal narrowing is present at L5-S1 with contact of the exiting nerve roots    Plan:  Reviewed imaging with patient and Dr. Hussein.  He has degenerative changes in his lumbar spine with a far left lateral disc causing some compression on the L5 nerve. His symptoms are not typical for an L5 radiculopathy.  Recommend following up with Dr. Loving for left L5 TFESI.  If he has relief even if it is not sustained, discussed that he may benefit from discectomy.  Will order left lower extremity EMG to see if his symptoms are stemming from L5.   Also discussed that he may be a candidate for spinal cord stimulator trial if there is no surgical recommendation.  Follow-up once EMG and injection completed with Dr. Hussein for further discussion.  Call sooner with any questions or concerns.       Diagnoses and all orders for this visit:    Lumbar radiculopathy - Left  -     Ambulatory referral to Neurosurgery  -     EMG 1 Limb; Future          I have spent a total time of 40 minutes on 03/06/24 in caring  for this patient including Diagnostic results, Risks and benefits of tx options, Instructions for management, Patient and family education, Impressions, Counseling / Coordination of care, Documenting in the medical record, Reviewing / ordering tests, medicine, procedures  , Obtaining or reviewing history  , and Communicating with other healthcare professionals .      CHIEF COMPLAINT    Chief Complaint   Patient presents with    Consult     BACK/SCIATIC PAIN  [place] [facility] 2/6/24          HISTORY    History of Present Illness     64 y.o. year old male     64-year-old gentleman seen for new evaluation of back and left lower extremity pain.  He has had back pain for years, has been progressively getting worse.  It is across his low back, more on the left.  Denies any precipitating injury or accident.  His back pain is constant, worse with standing and walking, better with sitting, but never goes away.  His pain ranges anywhere from a 4/10 to 9-10/10. In early May 2023, he developed unprovoked left leg pain. This radiates constantly to the left buttock and hip, down the lateral thigh to posterolateral calf, not into the foot.  There is numbness and tingling in this distribution as well.  Feels that his leg pain is worse than the back and comes on much more quickly.  His leg pain he rates anywhere from 3-4/10 up to a 9-10/10.  He ambulates independently, no bowel or bladder incontinence.  No right lower extremity complaints.  He did physical therapy over the summer in [place].  Currently using lidocaine patches and Tylenol for pain as he was unable to tolerate muscle relaxers.  He has undergone bilateral L3-5 medial branch blocks x 2, left L5-S1 and S1 TFESI x 4.  With the epidurals, he did get a couple weeks of relief, the more he received the less effective they were.        See Discussion    REVIEW OF SYSTEMS    Review of Systems   Constitutional: Negative.    HENT: Negative.     Eyes: Negative.    Respiratory:  Negative.     Cardiovascular: Negative.    Gastrointestinal: Negative.    Endocrine: Negative.    Genitourinary: Negative.    Musculoskeletal:  Positive for back pain (across the lower back radiates into the Left hip abd buutock. pain travles down the left leg and stops at the ankle), gait problem (unsteady at times) and myalgias (in the left leg and lower back).   Skin: Negative.    Allergic/Immunologic: Negative.    Neurological:  Positive for weakness (in the left leg, wealking or standing for long periods of time will cause pain to increase). Negative for numbness.   Hematological: Negative.    Psychiatric/Behavioral:  Positive for sleep disturbance (pain disrupts sleep, cant find a comfortable position, cant lay flat too long and cant lay on left side).        ROS obtained by MA. Héctor. See HPI.     Meds/Allergies     Current Outpatient Medications   Medication Sig Dispense Refill    acetaminophen (TYLENOL) 500 mg tablet Take 2 tablets by mouth in the morning and 2 tablets in the evening.      Cholecalciferol 25 MCG (1000 UT) tablet Take 1 tablet by mouth every other day      lidocaine (LIDODERM) 5 % Apply 1 patch topically daily Remove & Discard patch within 12 hours or as directed by MD      lisinopril (ZESTRIL) 5 mg tablet Take 1 tablet by mouth every morning      Polyvinyl Alcohol-Povidone PF 1.4-0.6 % SOLN INSTILL 1 DROP OF REFRESH TEARS IN EACH EYE 5 TIMES A DAY AS NEEDED FOR CHRONIC DRY EYE/BLEPHARITIS      pravastatin (PRAVACHOL) 40 mg tablet Take 40 mg by mouth      venlafaxine (EFFEXOR-XR) 37.5 mg 24 hr capsule Take 37.5 mg by mouth       No current facility-administered medications for this visit.       Allergies   Allergen Reactions    Ibuprofen Other (See Comments)       PAST HISTORY    Past Medical History:   Diagnosis Date    Arthritis     Hypercholesteremia     Hypertension     Kidney disease        Past Surgical History:   Procedure Laterality Date    EAR SURGERY Left     perforated ear drum  "   ELBOW SURGERY      EPIDURAL BLOCK INJECTION N/A 07/27/2023    Procedure: L5-S1 ILESI;  Surgeon: Melquiades Loving MD;  Location: [facility];  Service: Pain Management     EPIDURAL BLOCK INJECTION Left 08/22/2023    Procedure: LT L5 & S1 TFESI;  Surgeon: Melquiades Loving MD;  Location: [facility];  Service: Pain Management     EPIDURAL BLOCK INJECTION Left 11/07/2023    Procedure: LT L5 & S1 TFESI;  Surgeon: Melquiades Loving MD;  Location: [facility];  Service: Pain Management     NERVE BLOCK Bilateral 12/05/2023    Procedure: BLOCK MEDIAL BRANCH L3, L4, L5 #1;  Surgeon: Melquiades Loving MD;  Location: [facility];  Service: Pain Management     NERVE BLOCK Left 12/28/2023    Procedure: left L5 and S1 transforaminal LUIS;  Surgeon: Melquiades Loving MD;  Location: [facility];  Service: Pain Management     NERVE BLOCK Bilateral 2/21/2024    Procedure: B/L L3-L5 MBB #2;  Surgeon: Melquiades Loving MD;  Location: [facility];  Service: Pain Management     REPLACEMENT TOTAL KNEE Bilateral        Social History     Tobacco Use    Smoking status: Never    Smokeless tobacco: Never   Vaping Use    Vaping status: Never Used   Substance Use Topics    Alcohol use: Yes    Drug use: Never       History reviewed. No pertinent family history.      Above history personally reviewed.       EXAM    Vitals:Blood pressure 124/82, pulse 55, temperature 97.9 °F (36.6 °C), temperature source Temporal, resp. rate 18, height 5' 10\" (1.778 m), weight 93.9 kg (207 lb), SpO2 97%.,Body mass index is 29.7 kg/m².     Physical Exam  Vitals reviewed.   Constitutional:       General: He is awake.      Appearance: Normal appearance.   HENT:      Head: Normocephalic and atraumatic.   Eyes:      Conjunctiva/sclera: Conjunctivae normal.   Cardiovascular:      Rate and Rhythm: Normal rate.   Pulmonary:      Effort: Pulmonary effort is normal.   Musculoskeletal:      Comments: Midline low back TTP  Pain with extension and left-sided rotation.  LINDSEY + bilaterally. "   Skin:     General: Skin is warm and dry.   Neurological:      Mental Status: He is alert and oriented to person, place, and time.      Deep Tendon Reflexes:      Reflex Scores:       Patellar reflexes are 2+ on the right side and 2+ on the left side.  Psychiatric:         Attention and Perception: Attention and perception normal.         Mood and Affect: Mood and affect normal.         Speech: Speech normal.         Behavior: Behavior normal. Behavior is cooperative.         Thought Content: Thought content normal.         Cognition and Memory: Cognition and memory normal.         Judgment: Judgment normal.         Neurologic Exam     Mental Status   Oriented to person, place, and time.   Follows 2 step commands.   Attention: normal. Concentration: normal.   Speech: speech is normal   Level of consciousness: alert  Knowledge: good.   Normal comprehension.     Motor Exam   Muscle bulk: normal  Overall muscle tone: normal  BUE-5/5  RLE-5/5  LLE-5/5 except KF 4/5 due to pain     Sensory Exam   Right leg light touch: normal  Left leg light touch: diminished latearl thigh, posterolateral calf.    Gait, Coordination, and Reflexes     Tremor   Resting tremor: absent  Intention tremor: absent  Action tremor: absent    Reflexes   Right patellar: 2+  Left patellar: 2+  Right ankle clonus: absent  Left ankle clonus: absent  Antalgic gait         MEDICAL DECISION MAKING    Imaging Studies:     FL spine and pain procedure    Result Date: 2/21/2024  Narrative: A spine and pain study was performed by the Department of Spine and Pain. Please refer to the report for the official interpretation. The images are stored for archival purposes only. Study images were not formally reviewed by the Radiology Department.    MRI lumbar spine wo contrast    Result Date: 2/11/2024  Narrative: MRI LUMBAR SPINE WITHOUT CONTRAST INDICATION: M54.16: Radiculopathy, lumbar region. COMPARISON:  None. TECHNIQUE:  Multiplanar, multisequence imaging of  the lumbar spine was performed. . IMAGE QUALITY:  Diagnostic FINDINGS: VERTEBRAL BODIES:  There are 5 lumbar type vertebral bodies.  Normal alignment of the lumbar spine.  No spondylolysis or spondylolisthesis. No scoliosis.  No compression fracture.    Normal marrow signal is identified within the visualized bony structures.  No discrete marrow lesion. SACRUM:  Normal signal within the sacrum. No evidence of insufficiency or stress fracture. DISTAL CORD AND CONUS:  Normal size and signal within the distal cord and conus. PARASPINAL SOFT TISSUES:  Paraspinal soft tissues are unremarkable. LOWER THORACIC DISC SPACES:  Normal disc height and signal.  No disc herniation, canal stenosis or foraminal narrowing. LUMBAR DISC SPACES: L1-L2:  Normal. L2-L3: There is disc degeneration and narrowing. There is a bulging annulus. There is facet arthrosis. There is mild mass effect on the thecal sac. There is mild right greater than left foraminal narrowing. L3-L4: There is a bulging annulus. There is facet arthrosis. There is minimal mass effect on the thecal sac. There is mild foraminal narrowing. L4-L5: There is a bulging annulus, asymmetric to the right. There is facet arthrosis. There is marginal osteophytosis. There is mild mass effect on the thecal sac. There is mild right greater left foraminal narrowing. Probable Schmorl's node along the inferior endplate of L4. L5-S1: Bulging annulus. Facet arthrosis. Moderate bilateral foraminal narrowing with contact of the exiting nerve roots. No significant canal stenosis. OTHER FINDINGS:  None.     Impression: Degenerative changes of the lumbar spine, as described above. Moderate bilateral foraminal narrowing is present at L5-S1 with contact of the exiting nerve roots. Workstation performed: [id number]       I have personally reviewed pertinent reports.   and I have personally reviewed pertinent films in PACS

## 2024-03-06 NOTE — TELEPHONE ENCOUNTER
03/06/2024- EMG SCHEDULED ON APRIL 16TH EMAIL SENT OUT TO MOVE UP EARLIER. ONCE EMG IS MOVED UP AND PT COMPLETES INJECTION WILL REACH OUT TO PT TO MOVE UP APPT WITH DR. MULLINS TO DISCUSS SURGERY.

## 2024-03-07 ENCOUNTER — OFFICE VISIT (OUTPATIENT)
Dept: PAIN MEDICINE | Facility: CLINIC | Age: 64
End: 2024-03-07
Payer: COMMERCIAL

## 2024-03-07 ENCOUNTER — PREP FOR PROCEDURE (OUTPATIENT)
Dept: PAIN MEDICINE | Facility: CLINIC | Age: 64
End: 2024-03-07

## 2024-03-07 VITALS
OXYGEN SATURATION: 98 % | TEMPERATURE: 97.6 F | RESPIRATION RATE: 18 BRPM | HEIGHT: 70 IN | DIASTOLIC BLOOD PRESSURE: 84 MMHG | WEIGHT: 207 LBS | HEART RATE: 52 BPM | BODY MASS INDEX: 29.63 KG/M2 | SYSTOLIC BLOOD PRESSURE: 118 MMHG

## 2024-03-07 DIAGNOSIS — M54.16 LUMBAR RADICULOPATHY: Primary | ICD-10-CM

## 2024-03-07 PROCEDURE — 99214 OFFICE O/P EST MOD 30 MIN: CPT | Performed by: ANESTHESIOLOGY

## 2024-03-07 NOTE — PROGRESS NOTES
Assessment  1. Lumbar radiculopathy - Left    Greater than 90% relief of radicular pain and improved ability to participate with IADLs after bilateral L3, L4, L5 medial branch blocks #1 and #2; return of left sided radicular pain in left L5 and S1 dermatomes without pain limited weakness but endorsees numbness and paresthesias and times. Patient had limited benefit with repeat ILESI at L5-S1 and left L5 and S1 TFESI. Additionally patient was not able to tolerate gabapentin or lyrica due to side effect profile. No recent PT.      Axial low back pain described primarily by arthritic features.  Aching, nagging, indolent, stabbing, throbbing features in axial low back without radicular components.  5/5 strength bilaterally, negative SLR.  Positive facet loading maneuvers in lumbar spine elicited pain, positive tenderness to palpation over lumbar paraspinal muscles.  Findings correlate with prominent lumbar facet arthropathy seen throughout axial low back on x-ray.  Currently he is neurologically intact without gait instability, saddle anesthesia or bowel/bladder abnormality. Risks, benefits alternative to medial branch blocks and subsequent radiofrequency ablation of successful thoroughly discussed with patient.  Handouts provided questions answered to patient satisfaction.    The patient has been experiencing moderate to severe axial spine pain that is causing functional deficit.  The pain has been present for at least 3 months and is not improving with conservative care. Previously reported the following symptomatology:     Left-sided lumbar radicular pain in the L5 dermatomal distribution accompanied by pain limited weakness numbness and paresthesias.  Patient has not yet participated with PT recently but has in the past. Chronic pain with decreased participation with IADLs over the past 3 months.  Has been taking OTC tylenol in addition to gabapentin and steroids with modest benefit.  5/5 strength bilaterally,  positive SLR left-sided. Reflexes 2+.  Additionally there is positive facet loading, left greater than right. Denies any gait instability, saddle anesthesia. No imaging to review.  Risks, benefits alternatives to epidural steroid injections thoroughly discussed with patient.  Handouts provided questions answered to patient's satisfaction.  Lifestyle modifications extensively discussed including diet, exercise and weight loss in addition to core strengthening.  Will proceed with multimodal pain therapy plan as noted below:    Plan  -bilateral L3, L4, L5 medial branch nerve RFA; f/u 2 weeks post procedure  -left L5 TFESI  -lyrica 50 mg t.i.d. Ordered for patient; has since weaned given side effect profile. counseled regarding sedative effects of taking this medication and provided up titration calendar.  Counseled not to take medication while driving or operating heavy machinery/using stairs  -script provided for formal physical therapy for left-sided lumbar radiculopathy; Physician directed home exercise plan as per [employer] demonstrated and handouts provided that patient plans to participate with for 1 hour, twice a week for the next 6 weeks.     There are risks associated with opioid medications, including dependence, addiction and tolerance. The patient understands and agrees to use these medications only as prescribed. Potential side effects of the medications include, but are not limited to, constipation, drowsiness, addiction, impaired judgment and risk of fatal overdose if not taken as prescribed. The patient was warned against driving while taking sedation medications or operating heavy machinery. The patient voiced understanding. Sharing medications is a felony. At this point in time, the patient is showing no signs of addiction, abuse, diversion or suicidal ideation.     [place] Prescription Drug Monitoring Program report was reviewed and was appropriate      Complete risks and benefits including bleeding,  infection, tissue reaction, nerve injury and allergic reaction were discussed. The approach was demonstrated using models and literature was provided. Verbal and written consent was obtained.     My impressions and treatment recommendations were discussed in detail with the patient who verbalized understanding and had no further questions.  Discharge instructions were provided. I personally saw and examined the patient and I agree with the above discussed plan of care.    No orders of the defined types were placed in this encounter.      History of Present Illness    Greater than 90% relief of radicular pain and improved ability to participate with IADLs after bilateral L3, L4, L5 medial branch blocks #1 and #2; return of left sided radicular pain in left L5 and S1 dermatomes without pain limited weakness but endorsees numbness and paresthesias and times. Patient had limited benefit with repeat ILESI at L5-S1 and left L5 and S1 TFESI. Additionally patient was not able to tolerate gabapentin or lyrica due to side effect profile. No recent PT.      Jordi Dias is a 64 y.o. male  presenting with with a past medical history of chronic low back pain described primarily as arthritic in nature.  He describes 8/10 low back pain that is worse in the mornings and worse at the end of the day.  The pain is characterized by achy, nagging, indolent, crampy, stabbing pain in his axial low back.  The patient describes that the pain is worse with standing for long periods of time on hard surfaces as well as with walking.  The patient is a very active individual and feels as though this pain compromises his participation with independent activities of daily living. The pain can be debilitating at times and contribute to significant disability, compromising overall activity and independent activities of daily living.  He has tried physical therapy with limited relief of symptoms.  Medications the patient has tried in the past  include nsaids, tylenol.  He describes no radicular symptoms and has good strength.  He denies any weakness numbness or paresthesias.    Additionally reports the following symptomatology:     Previously reported chronic lumbar radicular pain in the left L5 dermatomal distribution since April. Debilitating pain limited weakness numbness and paresthesias accompany the pain. The patient rates the pain at a 8/10 accompanied by electric shock-like shooting features and crampy burning pain in the aforementioned dermatomal distributions.  The pain is worse in the mornings as well as the end of the day; exertion such as walking for long periods of time seems to exacerbate the pain.  The patient can hardly walk more than a few blocks without having debilitating pain.  He tries to maintain an active lifestyle and finds that the current degree of pain seems to compromises his efforts.  The pain significantly impacts independent activities of daily living and contributes to significant disability.  He has attempted physical therapy with exacerbation of the pain.  He has taken naproxen, tylenol as well as gabapentin with limited relief of the pain as well.  He has never tried epidural steroid injections in the past. He denies any bowel or bladder dysfunction/incontinence, saddle anesthesia or gait instability.    I have personally reviewed and/or updated the patient's past medical history, past surgical history, family history, social history, current medications, allergies, and vital signs today.     Review of Systems   Constitutional:  Positive for activity change.   HENT: Negative.     Eyes: Negative.    Respiratory: Negative.     Cardiovascular: Negative.    Gastrointestinal: Negative.    Endocrine: Negative.    Genitourinary: Negative.    Musculoskeletal:  Positive for arthralgias, back pain, gait problem and myalgias.   Skin: Negative.    Allergic/Immunologic: Negative.    Neurological:  Positive for weakness and numbness.    Hematological: Negative.    Psychiatric/Behavioral: Negative.     All other systems reviewed and are negative.      Patient Active Problem List   Diagnosis    Lumbar spondylosis - Bilateral    Lumbar radiculopathy       Past Medical History:   Diagnosis Date    Arthritis     Hypercholesteremia     Hypertension     Kidney disease        Past Surgical History:   Procedure Laterality Date    EAR SURGERY Left     perforated ear drum    ELBOW SURGERY      EPIDURAL BLOCK INJECTION N/A 07/27/2023    Procedure: L5-S1 ILESI;  Surgeon: Melquiades Loving MD;  Location: [facility];  Service: Pain Management     EPIDURAL BLOCK INJECTION Left 08/22/2023    Procedure: LT L5 & S1 TFESI;  Surgeon: Melquiades Loving MD;  Location: [facility];  Service: Pain Management     EPIDURAL BLOCK INJECTION Left 11/07/2023    Procedure: LT L5 & S1 TFESI;  Surgeon: Melquiades Loving MD;  Location: [facility];  Service: Pain Management     NERVE BLOCK Bilateral 12/05/2023    Procedure: BLOCK MEDIAL BRANCH L3, L4, L5 #1;  Surgeon: Melquiades Loving MD;  Location: [facility];  Service: Pain Management     NERVE BLOCK Left 12/28/2023    Procedure: left L5 and S1 transforaminal LUIS;  Surgeon: Melquiades Loving MD;  Location: [facility];  Service: Pain Management     NERVE BLOCK Bilateral 2/21/2024    Procedure: B/L L3-L5 MBB #2;  Surgeon: Melquiades Loving MD;  Location: [facility];  Service: Pain Management     REPLACEMENT TOTAL KNEE Bilateral        History reviewed. No pertinent family history.    Social History     Occupational History    Not on file   Tobacco Use    Smoking status: Never    Smokeless tobacco: Never   Vaping Use    Vaping status: Never Used   Substance and Sexual Activity    Alcohol use: Yes    Drug use: Never    Sexual activity: Not on file       Current Outpatient Medications on File Prior to Visit   Medication Sig    acetaminophen (TYLENOL) 500 mg tablet Take 2 tablets by mouth in the morning and 2 tablets in the evening.    Cholecalciferol  "25 MCG (1000 UT) tablet Take 1 tablet by mouth every other day    lidocaine (LIDODERM) 5 % Apply 1 patch topically daily Remove & Discard patch within 12 hours or as directed by MD    lisinopril (ZESTRIL) 5 mg tablet Take 1 tablet by mouth every morning    Polyvinyl Alcohol-Povidone PF 1.4-0.6 % SOLN INSTILL 1 DROP OF REFRESH TEARS IN EACH EYE 5 TIMES A DAY AS NEEDED FOR CHRONIC DRY EYE/BLEPHARITIS    pravastatin (PRAVACHOL) 40 mg tablet Take 40 mg by mouth    venlafaxine (EFFEXOR-XR) 37.5 mg 24 hr capsule Take 37.5 mg by mouth     No current facility-administered medications on file prior to visit.       Allergies   Allergen Reactions    Ibuprofen Other (See Comments)         Physical Exam    /84 (BP Location: Left arm, Patient Position: Sitting, Cuff Size: Adult)   Pulse (!) 52   Temp 97.6 °F (36.4 °C)   Resp 18   Ht 5' 10\" (1.778 m)   Wt 93.9 kg (207 lb)   SpO2 98%   BMI 29.70 kg/m²     Constitutional: normal, well developed, well nourished, alert, in no distress and non-toxic and no overt pain behavior. and overweight  Eyes: anicteric  HEENT: grossly intact  Neck: supple, symmetric, trachea midline and no masses   Pulmonary:even and unlabored  Cardiovascular:No edema or pitting edema present  Skin:Normal without rashes or lesions and well hydrated  Psychiatric:Mood and affect appropriate  Neurologic:Cranial Nerves II-XII grossly intact Sensation grossly intact; no clonus negative anders's. Reflexes 2+ and brisk. SLR positive left sided. Musculoskeletal:normal gait. 5/5 strength bilaterally with AROM in all extremities. Difficulty with normal heel toe and tip toe walking. Significant pain with lumbar facet loading bilaterally and with lateral spine rotation, left greater than right. ttp over lumbar paraspinal muscles. Negative basilio's test, negative gaenslen's negative SIJ loading bilaterally.    Imaging    MRI LUMBAR SPINE WITHOUT CONTRAST     INDICATION: M54.16: Radiculopathy, lumbar region.   "   COMPARISON:  None.     TECHNIQUE:  Multiplanar, multisequence imaging of the lumbar spine was performed. .        IMAGE QUALITY:  Diagnostic     FINDINGS:     VERTEBRAL BODIES:  There are 5 lumbar type vertebral bodies.  Normal alignment of the lumbar spine.  No spondylolysis or spondylolisthesis. No scoliosis.  No compression fracture.    Normal marrow signal is identified within the visualized bony   structures.  No discrete marrow lesion.     SACRUM:  Normal signal within the sacrum. No evidence of insufficiency or stress fracture.     DISTAL CORD AND CONUS:  Normal size and signal within the distal cord and conus.     PARASPINAL SOFT TISSUES:  Paraspinal soft tissues are unremarkable.     LOWER THORACIC DISC SPACES:  Normal disc height and signal.  No disc herniation, canal stenosis or foraminal narrowing.     LUMBAR DISC SPACES:     L1-L2:  Normal.     L2-L3: There is disc degeneration and narrowing. There is a bulging annulus. There is facet arthrosis. There is mild mass effect on the thecal sac. There is mild right greater than left foraminal narrowing.     L3-L4: There is a bulging annulus. There is facet arthrosis. There is minimal mass effect on the thecal sac. There is mild foraminal narrowing.     L4-L5: There is a bulging annulus, asymmetric to the right. There is facet arthrosis. There is marginal osteophytosis. There is mild mass effect on the thecal sac. There is mild right greater left foraminal narrowing. Probable Schmorl's node along the   inferior endplate of L4.     L5-S1: Bulging annulus. Facet arthrosis. Moderate bilateral foraminal narrowing with contact of the exiting nerve roots. No significant canal stenosis.     OTHER FINDINGS:  None.     IMPRESSION:     Degenerative changes of the lumbar spine, as described above.     Moderate bilateral foraminal narrowing is present at L5-S1 with contact of the exiting nerve roots.        Workstation performed: [id number]

## 2024-03-07 NOTE — H&P (VIEW-ONLY)
Assessment  1. Lumbar radiculopathy - Left    Greater than 90% relief of radicular pain and improved ability to participate with IADLs after bilateral L3, L4, L5 medial branch blocks #1 and #2; return of left sided radicular pain in left L5 and S1 dermatomes without pain limited weakness but endorsees numbness and paresthesias and times. Patient had limited benefit with repeat ILESI at L5-S1 and left L5 and S1 TFESI. Additionally patient was not able to tolerate gabapentin or lyrica due to side effect profile. No recent PT.      Axial low back pain described primarily by arthritic features.  Aching, nagging, indolent, stabbing, throbbing features in axial low back without radicular components.  5/5 strength bilaterally, negative SLR.  Positive facet loading maneuvers in lumbar spine elicited pain, positive tenderness to palpation over lumbar paraspinal muscles.  Findings correlate with prominent lumbar facet arthropathy seen throughout axial low back on x-ray.  Currently he is neurologically intact without gait instability, saddle anesthesia or bowel/bladder abnormality. Risks, benefits alternative to medial branch blocks and subsequent radiofrequency ablation of successful thoroughly discussed with patient.  Handouts provided questions answered to patient satisfaction.    The patient has been experiencing moderate to severe axial spine pain that is causing functional deficit.  The pain has been present for at least 3 months and is not improving with conservative care. Previously reported the following symptomatology:     Left-sided lumbar radicular pain in the L5 dermatomal distribution accompanied by pain limited weakness numbness and paresthesias.  Patient has not yet participated with PT recently but has in the past. Chronic pain with decreased participation with IADLs over the past 3 months.  Has been taking OTC tylenol in addition to gabapentin and steroids with modest benefit.  5/5 strength bilaterally,  positive SLR left-sided. Reflexes 2+.  Additionally there is positive facet loading, left greater than right. Denies any gait instability, saddle anesthesia. No imaging to review.  Risks, benefits alternatives to epidural steroid injections thoroughly discussed with patient.  Handouts provided questions answered to patient's satisfaction.  Lifestyle modifications extensively discussed including diet, exercise and weight loss in addition to core strengthening.  Will proceed with multimodal pain therapy plan as noted below:    Plan  -bilateral L3, L4, L5 medial branch nerve RFA; f/u 2 weeks post procedure  -left L5 TFESI  -lyrica 50 mg t.i.d. Ordered for patient; has since weaned given side effect profile. counseled regarding sedative effects of taking this medication and provided up titration calendar.  Counseled not to take medication while driving or operating heavy machinery/using stairs  -script provided for formal physical therapy for left-sided lumbar radiculopathy; Physician directed home exercise plan as per [employer] demonstrated and handouts provided that patient plans to participate with for 1 hour, twice a week for the next 6 weeks.     There are risks associated with opioid medications, including dependence, addiction and tolerance. The patient understands and agrees to use these medications only as prescribed. Potential side effects of the medications include, but are not limited to, constipation, drowsiness, addiction, impaired judgment and risk of fatal overdose if not taken as prescribed. The patient was warned against driving while taking sedation medications or operating heavy machinery. The patient voiced understanding. Sharing medications is a felony. At this point in time, the patient is showing no signs of addiction, abuse, diversion or suicidal ideation.     [place] Prescription Drug Monitoring Program report was reviewed and was appropriate      Complete risks and benefits including bleeding,  infection, tissue reaction, nerve injury and allergic reaction were discussed. The approach was demonstrated using models and literature was provided. Verbal and written consent was obtained.     My impressions and treatment recommendations were discussed in detail with the patient who verbalized understanding and had no further questions.  Discharge instructions were provided. I personally saw and examined the patient and I agree with the above discussed plan of care.    No orders of the defined types were placed in this encounter.      History of Present Illness    Greater than 90% relief of radicular pain and improved ability to participate with IADLs after bilateral L3, L4, L5 medial branch blocks #1 and #2; return of left sided radicular pain in left L5 and S1 dermatomes without pain limited weakness but endorsees numbness and paresthesias and times. Patient had limited benefit with repeat ILESI at L5-S1 and left L5 and S1 TFESI. Additionally patient was not able to tolerate gabapentin or lyrica due to side effect profile. No recent PT.      Jordi Dias is a 64 y.o. male  presenting with with a past medical history of chronic low back pain described primarily as arthritic in nature.  He describes 8/10 low back pain that is worse in the mornings and worse at the end of the day.  The pain is characterized by achy, nagging, indolent, crampy, stabbing pain in his axial low back.  The patient describes that the pain is worse with standing for long periods of time on hard surfaces as well as with walking.  The patient is a very active individual and feels as though this pain compromises his participation with independent activities of daily living. The pain can be debilitating at times and contribute to significant disability, compromising overall activity and independent activities of daily living.  He has tried physical therapy with limited relief of symptoms.  Medications the patient has tried in the past  include nsaids, tylenol.  He describes no radicular symptoms and has good strength.  He denies any weakness numbness or paresthesias.    Additionally reports the following symptomatology:     Previously reported chronic lumbar radicular pain in the left L5 dermatomal distribution since April. Debilitating pain limited weakness numbness and paresthesias accompany the pain. The patient rates the pain at a 8/10 accompanied by electric shock-like shooting features and crampy burning pain in the aforementioned dermatomal distributions.  The pain is worse in the mornings as well as the end of the day; exertion such as walking for long periods of time seems to exacerbate the pain.  The patient can hardly walk more than a few blocks without having debilitating pain.  He tries to maintain an active lifestyle and finds that the current degree of pain seems to compromises his efforts.  The pain significantly impacts independent activities of daily living and contributes to significant disability.  He has attempted physical therapy with exacerbation of the pain.  He has taken naproxen, tylenol as well as gabapentin with limited relief of the pain as well.  He has never tried epidural steroid injections in the past. He denies any bowel or bladder dysfunction/incontinence, saddle anesthesia or gait instability.    I have personally reviewed and/or updated the patient's past medical history, past surgical history, family history, social history, current medications, allergies, and vital signs today.     Review of Systems   Constitutional:  Positive for activity change.   HENT: Negative.     Eyes: Negative.    Respiratory: Negative.     Cardiovascular: Negative.    Gastrointestinal: Negative.    Endocrine: Negative.    Genitourinary: Negative.    Musculoskeletal:  Positive for arthralgias, back pain, gait problem and myalgias.   Skin: Negative.    Allergic/Immunologic: Negative.    Neurological:  Positive for weakness and numbness.    Hematological: Negative.    Psychiatric/Behavioral: Negative.     All other systems reviewed and are negative.      Patient Active Problem List   Diagnosis    Lumbar spondylosis - Bilateral    Lumbar radiculopathy       Past Medical History:   Diagnosis Date    Arthritis     Hypercholesteremia     Hypertension     Kidney disease        Past Surgical History:   Procedure Laterality Date    EAR SURGERY Left     perforated ear drum    ELBOW SURGERY      EPIDURAL BLOCK INJECTION N/A 07/27/2023    Procedure: L5-S1 ILESI;  Surgeon: Melquiades Loving MD;  Location: [facility];  Service: Pain Management     EPIDURAL BLOCK INJECTION Left 08/22/2023    Procedure: LT L5 & S1 TFESI;  Surgeon: Melquiades Loving MD;  Location: [facility];  Service: Pain Management     EPIDURAL BLOCK INJECTION Left 11/07/2023    Procedure: LT L5 & S1 TFESI;  Surgeon: Melquiades Loving MD;  Location: [facility];  Service: Pain Management     NERVE BLOCK Bilateral 12/05/2023    Procedure: BLOCK MEDIAL BRANCH L3, L4, L5 #1;  Surgeon: Melquiades Loving MD;  Location: [facility];  Service: Pain Management     NERVE BLOCK Left 12/28/2023    Procedure: left L5 and S1 transforaminal LUIS;  Surgeon: Melquiades Loving MD;  Location: [facility];  Service: Pain Management     NERVE BLOCK Bilateral 2/21/2024    Procedure: B/L L3-L5 MBB #2;  Surgeon: Melquiades Loving MD;  Location: [facility];  Service: Pain Management     REPLACEMENT TOTAL KNEE Bilateral        History reviewed. No pertinent family history.    Social History     Occupational History    Not on file   Tobacco Use    Smoking status: Never    Smokeless tobacco: Never   Vaping Use    Vaping status: Never Used   Substance and Sexual Activity    Alcohol use: Yes    Drug use: Never    Sexual activity: Not on file       Current Outpatient Medications on File Prior to Visit   Medication Sig    acetaminophen (TYLENOL) 500 mg tablet Take 2 tablets by mouth in the morning and 2 tablets in the evening.    Cholecalciferol  "25 MCG (1000 UT) tablet Take 1 tablet by mouth every other day    lidocaine (LIDODERM) 5 % Apply 1 patch topically daily Remove & Discard patch within 12 hours or as directed by MD    lisinopril (ZESTRIL) 5 mg tablet Take 1 tablet by mouth every morning    Polyvinyl Alcohol-Povidone PF 1.4-0.6 % SOLN INSTILL 1 DROP OF REFRESH TEARS IN EACH EYE 5 TIMES A DAY AS NEEDED FOR CHRONIC DRY EYE/BLEPHARITIS    pravastatin (PRAVACHOL) 40 mg tablet Take 40 mg by mouth    venlafaxine (EFFEXOR-XR) 37.5 mg 24 hr capsule Take 37.5 mg by mouth     No current facility-administered medications on file prior to visit.       Allergies   Allergen Reactions    Ibuprofen Other (See Comments)         Physical Exam    /84 (BP Location: Left arm, Patient Position: Sitting, Cuff Size: Adult)   Pulse (!) 52   Temp 97.6 °F (36.4 °C)   Resp 18   Ht 5' 10\" (1.778 m)   Wt 93.9 kg (207 lb)   SpO2 98%   BMI 29.70 kg/m²     Constitutional: normal, well developed, well nourished, alert, in no distress and non-toxic and no overt pain behavior. and overweight  Eyes: anicteric  HEENT: grossly intact  Neck: supple, symmetric, trachea midline and no masses   Pulmonary:even and unlabored  Cardiovascular:No edema or pitting edema present  Skin:Normal without rashes or lesions and well hydrated  Psychiatric:Mood and affect appropriate  Neurologic:Cranial Nerves II-XII grossly intact Sensation grossly intact; no clonus negative anders's. Reflexes 2+ and brisk. SLR positive left sided. Musculoskeletal:normal gait. 5/5 strength bilaterally with AROM in all extremities. Difficulty with normal heel toe and tip toe walking. Significant pain with lumbar facet loading bilaterally and with lateral spine rotation, left greater than right. ttp over lumbar paraspinal muscles. Negative basilio's test, negative gaenslen's negative SIJ loading bilaterally.    Imaging    MRI LUMBAR SPINE WITHOUT CONTRAST     INDICATION: M54.16: Radiculopathy, lumbar region.   "   COMPARISON:  None.     TECHNIQUE:  Multiplanar, multisequence imaging of the lumbar spine was performed. .        IMAGE QUALITY:  Diagnostic     FINDINGS:     VERTEBRAL BODIES:  There are 5 lumbar type vertebral bodies.  Normal alignment of the lumbar spine.  No spondylolysis or spondylolisthesis. No scoliosis.  No compression fracture.    Normal marrow signal is identified within the visualized bony   structures.  No discrete marrow lesion.     SACRUM:  Normal signal within the sacrum. No evidence of insufficiency or stress fracture.     DISTAL CORD AND CONUS:  Normal size and signal within the distal cord and conus.     PARASPINAL SOFT TISSUES:  Paraspinal soft tissues are unremarkable.     LOWER THORACIC DISC SPACES:  Normal disc height and signal.  No disc herniation, canal stenosis or foraminal narrowing.     LUMBAR DISC SPACES:     L1-L2:  Normal.     L2-L3: There is disc degeneration and narrowing. There is a bulging annulus. There is facet arthrosis. There is mild mass effect on the thecal sac. There is mild right greater than left foraminal narrowing.     L3-L4: There is a bulging annulus. There is facet arthrosis. There is minimal mass effect on the thecal sac. There is mild foraminal narrowing.     L4-L5: There is a bulging annulus, asymmetric to the right. There is facet arthrosis. There is marginal osteophytosis. There is mild mass effect on the thecal sac. There is mild right greater left foraminal narrowing. Probable Schmorl's node along the   inferior endplate of L4.     L5-S1: Bulging annulus. Facet arthrosis. Moderate bilateral foraminal narrowing with contact of the exiting nerve roots. No significant canal stenosis.     OTHER FINDINGS:  None.     IMPRESSION:     Degenerative changes of the lumbar spine, as described above.     Moderate bilateral foraminal narrowing is present at L5-S1 with contact of the exiting nerve roots.        Workstation performed: [id number]

## 2024-03-07 NOTE — H&P (VIEW-ONLY)
Assessment  1. Lumbar radiculopathy - Left    Greater than 90% relief of radicular pain and improved ability to participate with IADLs after bilateral L3, L4, L5 medial branch blocks #1 and #2; return of left sided radicular pain in left L5 and S1 dermatomes without pain limited weakness but endorsees numbness and paresthesias and times. Patient had limited benefit with repeat ILESI at L5-S1 and left L5 and S1 TFESI. Additionally patient was not able to tolerate gabapentin or lyrica due to side effect profile. No recent PT.      Axial low back pain described primarily by arthritic features.  Aching, nagging, indolent, stabbing, throbbing features in axial low back without radicular components.  5/5 strength bilaterally, negative SLR.  Positive facet loading maneuvers in lumbar spine elicited pain, positive tenderness to palpation over lumbar paraspinal muscles.  Findings correlate with prominent lumbar facet arthropathy seen throughout axial low back on x-ray.  Currently he is neurologically intact without gait instability, saddle anesthesia or bowel/bladder abnormality. Risks, benefits alternative to medial branch blocks and subsequent radiofrequency ablation of successful thoroughly discussed with patient.  Handouts provided questions answered to patient satisfaction.    The patient has been experiencing moderate to severe axial spine pain that is causing functional deficit.  The pain has been present for at least 3 months and is not improving with conservative care. Previously reported the following symptomatology:     Left-sided lumbar radicular pain in the L5 dermatomal distribution accompanied by pain limited weakness numbness and paresthesias.  Patient has not yet participated with PT recently but has in the past. Chronic pain with decreased participation with IADLs over the past 3 months.  Has been taking OTC tylenol in addition to gabapentin and steroids with modest benefit.  5/5 strength bilaterally,  positive SLR left-sided. Reflexes 2+.  Additionally there is positive facet loading, left greater than right. Denies any gait instability, saddle anesthesia. No imaging to review.  Risks, benefits alternatives to epidural steroid injections thoroughly discussed with patient.  Handouts provided questions answered to patient's satisfaction.  Lifestyle modifications extensively discussed including diet, exercise and weight loss in addition to core strengthening.  Will proceed with multimodal pain therapy plan as noted below:    Plan  -bilateral L3, L4, L5 medial branch nerve RFA; f/u 2 weeks post procedure  -left L5 TFESI  -lyrica 50 mg t.i.d. Ordered for patient; has since weaned given side effect profile. counseled regarding sedative effects of taking this medication and provided up titration calendar.  Counseled not to take medication while driving or operating heavy machinery/using stairs  -script provided for formal physical therapy for left-sided lumbar radiculopathy; Physician directed home exercise plan as per [employer] demonstrated and handouts provided that patient plans to participate with for 1 hour, twice a week for the next 6 weeks.     There are risks associated with opioid medications, including dependence, addiction and tolerance. The patient understands and agrees to use these medications only as prescribed. Potential side effects of the medications include, but are not limited to, constipation, drowsiness, addiction, impaired judgment and risk of fatal overdose if not taken as prescribed. The patient was warned against driving while taking sedation medications or operating heavy machinery. The patient voiced understanding. Sharing medications is a felony. At this point in time, the patient is showing no signs of addiction, abuse, diversion or suicidal ideation.     [place] Prescription Drug Monitoring Program report was reviewed and was appropriate      Complete risks and benefits including bleeding,  infection, tissue reaction, nerve injury and allergic reaction were discussed. The approach was demonstrated using models and literature was provided. Verbal and written consent was obtained.     My impressions and treatment recommendations were discussed in detail with the patient who verbalized understanding and had no further questions.  Discharge instructions were provided. I personally saw and examined the patient and I agree with the above discussed plan of care.    No orders of the defined types were placed in this encounter.      History of Present Illness    Greater than 90% relief of radicular pain and improved ability to participate with IADLs after bilateral L3, L4, L5 medial branch blocks #1 and #2; return of left sided radicular pain in left L5 and S1 dermatomes without pain limited weakness but endorsees numbness and paresthesias and times. Patient had limited benefit with repeat ILESI at L5-S1 and left L5 and S1 TFESI. Additionally patient was not able to tolerate gabapentin or lyrica due to side effect profile. No recent PT.      Jordi Dias is a 64 y.o. male  presenting with with a past medical history of chronic low back pain described primarily as arthritic in nature.  He describes 8/10 low back pain that is worse in the mornings and worse at the end of the day.  The pain is characterized by achy, nagging, indolent, crampy, stabbing pain in his axial low back.  The patient describes that the pain is worse with standing for long periods of time on hard surfaces as well as with walking.  The patient is a very active individual and feels as though this pain compromises his participation with independent activities of daily living. The pain can be debilitating at times and contribute to significant disability, compromising overall activity and independent activities of daily living.  He has tried physical therapy with limited relief of symptoms.  Medications the patient has tried in the past  include nsaids, tylenol.  He describes no radicular symptoms and has good strength.  He denies any weakness numbness or paresthesias.    Additionally reports the following symptomatology:     Previously reported chronic lumbar radicular pain in the left L5 dermatomal distribution since April. Debilitating pain limited weakness numbness and paresthesias accompany the pain. The patient rates the pain at a 8/10 accompanied by electric shock-like shooting features and crampy burning pain in the aforementioned dermatomal distributions.  The pain is worse in the mornings as well as the end of the day; exertion such as walking for long periods of time seems to exacerbate the pain.  The patient can hardly walk more than a few blocks without having debilitating pain.  He tries to maintain an active lifestyle and finds that the current degree of pain seems to compromises his efforts.  The pain significantly impacts independent activities of daily living and contributes to significant disability.  He has attempted physical therapy with exacerbation of the pain.  He has taken naproxen, tylenol as well as gabapentin with limited relief of the pain as well.  He has never tried epidural steroid injections in the past. He denies any bowel or bladder dysfunction/incontinence, saddle anesthesia or gait instability.    I have personally reviewed and/or updated the patient's past medical history, past surgical history, family history, social history, current medications, allergies, and vital signs today.     Review of Systems   Constitutional:  Positive for activity change.   HENT: Negative.     Eyes: Negative.    Respiratory: Negative.     Cardiovascular: Negative.    Gastrointestinal: Negative.    Endocrine: Negative.    Genitourinary: Negative.    Musculoskeletal:  Positive for arthralgias, back pain, gait problem and myalgias.   Skin: Negative.    Allergic/Immunologic: Negative.    Neurological:  Positive for weakness and numbness.    Hematological: Negative.    Psychiatric/Behavioral: Negative.     All other systems reviewed and are negative.      Patient Active Problem List   Diagnosis    Lumbar spondylosis - Bilateral    Lumbar radiculopathy       Past Medical History:   Diagnosis Date    Arthritis     Hypercholesteremia     Hypertension     Kidney disease        Past Surgical History:   Procedure Laterality Date    EAR SURGERY Left     perforated ear drum    ELBOW SURGERY      EPIDURAL BLOCK INJECTION N/A 07/27/2023    Procedure: L5-S1 ILESI;  Surgeon: Melquiades Loving MD;  Location: [facility];  Service: Pain Management     EPIDURAL BLOCK INJECTION Left 08/22/2023    Procedure: LT L5 & S1 TFESI;  Surgeon: Melquiades Loving MD;  Location: [facility];  Service: Pain Management     EPIDURAL BLOCK INJECTION Left 11/07/2023    Procedure: LT L5 & S1 TFESI;  Surgeon: Melquiades Loving MD;  Location: [facility];  Service: Pain Management     NERVE BLOCK Bilateral 12/05/2023    Procedure: BLOCK MEDIAL BRANCH L3, L4, L5 #1;  Surgeon: Melquiades Loving MD;  Location: [facility];  Service: Pain Management     NERVE BLOCK Left 12/28/2023    Procedure: left L5 and S1 transforaminal LUIS;  Surgeon: Melquiades Loving MD;  Location: [facility];  Service: Pain Management     NERVE BLOCK Bilateral 2/21/2024    Procedure: B/L L3-L5 MBB #2;  Surgeon: Melquiades Loving MD;  Location: [facility];  Service: Pain Management     REPLACEMENT TOTAL KNEE Bilateral        History reviewed. No pertinent family history.    Social History     Occupational History    Not on file   Tobacco Use    Smoking status: Never    Smokeless tobacco: Never   Vaping Use    Vaping status: Never Used   Substance and Sexual Activity    Alcohol use: Yes    Drug use: Never    Sexual activity: Not on file       Current Outpatient Medications on File Prior to Visit   Medication Sig    acetaminophen (TYLENOL) 500 mg tablet Take 2 tablets by mouth in the morning and 2 tablets in the evening.    Cholecalciferol  "25 MCG (1000 UT) tablet Take 1 tablet by mouth every other day    lidocaine (LIDODERM) 5 % Apply 1 patch topically daily Remove & Discard patch within 12 hours or as directed by MD    lisinopril (ZESTRIL) 5 mg tablet Take 1 tablet by mouth every morning    Polyvinyl Alcohol-Povidone PF 1.4-0.6 % SOLN INSTILL 1 DROP OF REFRESH TEARS IN EACH EYE 5 TIMES A DAY AS NEEDED FOR CHRONIC DRY EYE/BLEPHARITIS    pravastatin (PRAVACHOL) 40 mg tablet Take 40 mg by mouth    venlafaxine (EFFEXOR-XR) 37.5 mg 24 hr capsule Take 37.5 mg by mouth     No current facility-administered medications on file prior to visit.       Allergies   Allergen Reactions    Ibuprofen Other (See Comments)         Physical Exam    /84 (BP Location: Left arm, Patient Position: Sitting, Cuff Size: Adult)   Pulse (!) 52   Temp 97.6 °F (36.4 °C)   Resp 18   Ht 5' 10\" (1.778 m)   Wt 93.9 kg (207 lb)   SpO2 98%   BMI 29.70 kg/m²     Constitutional: normal, well developed, well nourished, alert, in no distress and non-toxic and no overt pain behavior. and overweight  Eyes: anicteric  HEENT: grossly intact  Neck: supple, symmetric, trachea midline and no masses   Pulmonary:even and unlabored  Cardiovascular:No edema or pitting edema present  Skin:Normal without rashes or lesions and well hydrated  Psychiatric:Mood and affect appropriate  Neurologic:Cranial Nerves II-XII grossly intact Sensation grossly intact; no clonus negative anders's. Reflexes 2+ and brisk. SLR positive left sided. Musculoskeletal:normal gait. 5/5 strength bilaterally with AROM in all extremities. Difficulty with normal heel toe and tip toe walking. Significant pain with lumbar facet loading bilaterally and with lateral spine rotation, left greater than right. ttp over lumbar paraspinal muscles. Negative basilio's test, negative gaenslen's negative SIJ loading bilaterally.    Imaging    MRI LUMBAR SPINE WITHOUT CONTRAST     INDICATION: M54.16: Radiculopathy, lumbar region.   "   COMPARISON:  None.     TECHNIQUE:  Multiplanar, multisequence imaging of the lumbar spine was performed. .        IMAGE QUALITY:  Diagnostic     FINDINGS:     VERTEBRAL BODIES:  There are 5 lumbar type vertebral bodies.  Normal alignment of the lumbar spine.  No spondylolysis or spondylolisthesis. No scoliosis.  No compression fracture.    Normal marrow signal is identified within the visualized bony   structures.  No discrete marrow lesion.     SACRUM:  Normal signal within the sacrum. No evidence of insufficiency or stress fracture.     DISTAL CORD AND CONUS:  Normal size and signal within the distal cord and conus.     PARASPINAL SOFT TISSUES:  Paraspinal soft tissues are unremarkable.     LOWER THORACIC DISC SPACES:  Normal disc height and signal.  No disc herniation, canal stenosis or foraminal narrowing.     LUMBAR DISC SPACES:     L1-L2:  Normal.     L2-L3: There is disc degeneration and narrowing. There is a bulging annulus. There is facet arthrosis. There is mild mass effect on the thecal sac. There is mild right greater than left foraminal narrowing.     L3-L4: There is a bulging annulus. There is facet arthrosis. There is minimal mass effect on the thecal sac. There is mild foraminal narrowing.     L4-L5: There is a bulging annulus, asymmetric to the right. There is facet arthrosis. There is marginal osteophytosis. There is mild mass effect on the thecal sac. There is mild right greater left foraminal narrowing. Probable Schmorl's node along the   inferior endplate of L4.     L5-S1: Bulging annulus. Facet arthrosis. Moderate bilateral foraminal narrowing with contact of the exiting nerve roots. No significant canal stenosis.     OTHER FINDINGS:  None.     IMPRESSION:     Degenerative changes of the lumbar spine, as described above.     Moderate bilateral foraminal narrowing is present at L5-S1 with contact of the exiting nerve roots.        Workstation performed: [id number]

## 2024-03-12 ENCOUNTER — HOSPITAL ENCOUNTER (OUTPATIENT)
Dept: INTERVENTIONAL RADIOLOGY/VASCULAR | Facility: HOSPITAL | Age: 64
Discharge: HOME | End: 2024-03-12
Attending: ANESTHESIOLOGY | Admitting: ANESTHESIOLOGY
Payer: COMMERCIAL

## 2024-03-12 VITALS
HEIGHT: 70 IN | OXYGEN SATURATION: 96 % | DIASTOLIC BLOOD PRESSURE: 87 MMHG | HEART RATE: 50 BPM | TEMPERATURE: 97.6 F | BODY MASS INDEX: 29.63 KG/M2 | SYSTOLIC BLOOD PRESSURE: 156 MMHG | WEIGHT: 207 LBS | RESPIRATION RATE: 20 BRPM

## 2024-03-12 DIAGNOSIS — M54.16 LUMBAR RADICULOPATHY: ICD-10-CM

## 2024-03-12 PROCEDURE — 64483 NJX AA&/STRD TFRM EPI L/S 1: CPT | Performed by: ANESTHESIOLOGY

## 2024-03-12 RX ORDER — BETAMETHASONE SODIUM PHOSPHATE AND BETAMETHASONE ACETATE 3; 3 MG/ML; MG/ML
INJECTION, SUSPENSION INTRA-ARTICULAR; INTRALESIONAL; INTRAMUSCULAR; SOFT TISSUE AS NEEDED
Status: COMPLETED | OUTPATIENT
Start: 2024-03-12 | End: 2024-03-12

## 2024-03-12 RX ORDER — BUPIVACAINE HCL/PF 2.5 MG/ML
VIAL (ML) INJECTION AS NEEDED
Status: COMPLETED | OUTPATIENT
Start: 2024-03-12 | End: 2024-03-12

## 2024-03-12 RX ORDER — LIDOCAINE HYDROCHLORIDE 10 MG/ML
INJECTION, SOLUTION EPIDURAL; INFILTRATION; INTRACAUDAL; PERINEURAL AS NEEDED
Status: COMPLETED | OUTPATIENT
Start: 2024-03-12 | End: 2024-03-12

## 2024-03-12 RX ADMIN — BETAMETHASONE SODIUM PHOSPHATE AND BETAMETHASONE ACETATE 6 MG: 3; 3 INJECTION, SUSPENSION INTRA-ARTICULAR; INTRALESIONAL; INTRAMUSCULAR at 09:47

## 2024-03-12 RX ADMIN — IOHEXOL 1 ML: 240 INJECTION, SOLUTION INTRATHECAL; INTRAVASCULAR; INTRAVENOUS; ORAL at 09:47

## 2024-03-12 RX ADMIN — LIDOCAINE HYDROCHLORIDE 5 ML: 10 INJECTION, SOLUTION EPIDURAL; INFILTRATION; INTRACAUDAL; PERINEURAL at 09:48

## 2024-03-12 RX ADMIN — BUPIVACAINE HYDROCHLORIDE 1 ML: 2.5 INJECTION, SOLUTION EPIDURAL; INFILTRATION; INTRACAUDAL; PERINEURAL at 10:03

## 2024-03-12 NOTE — DISCHARGE INSTR - AVS FIRST PAGE
YOUR 2 WEEK FOLLOW UP HAS BEEN SCHEDULED; IF YOU WISH TO CHANGE THE FOLLOW UP, PLEASE CALL THE [facility] AT [place]: [phone]    Epidural Steroid Injection   WHAT YOU NEED TO KNOW:   An epidural steroid injection (LUIS) is a procedure to inject steroid medicine into the epidural space. The epidural space is between your spinal cord and vertebrae. Steroids reduce inflammation and fluid buildup in your spine that may be causing pain. You may be given pain medicine along with the steroids.        DISCHARGE INSTRUCTIONS:   Call your local emergency number (911 in the [place]) if:   You have a seizure.    You have trouble moving your legs.    Seek care immediately if:   Blood soaks through your bandage.    You have a fever or chills, severe back pain, and the procedure area is sensitive to the touch.    You cannot control when you urinate or have a bowel movement.    Call your doctor if:   You have weakness or numbness in your legs.    Your wound is red, swollen, or draining pus.    You have nausea or are vomiting.    Your face or neck is red and you feel warm.    You have more pain than you had before the procedure.    You have swelling in your hands or feet.    You have questions or concerns about your condition or care.    Care for your wound as directed:  You may remove the bandage before you go to bed the day of your procedure. You may take a shower, but do not take a bath for at least 24 hours.   Self-care:   Do not drive,  use machines, or do strenuous activity for 24 hours after your procedure or as directed.     Continue other treatments  as directed. Steroid injections alone will not control your pain. The injections are meant to be used with other treatments, such as physical therapy.    Follow up with your doctor as directed:  Write down your questions so you remember to ask them during your visits.     EPIDURAL STEROID INJECTION DISCHARGE INSTRUCTIONS      ACTIVITY  Do not drive or operate  machinery today.  No strenuous activity today - bending, lifting, etc.   You may resume normal activities starting tomorrow - start slowly and as tolerated.  You may shower today, but not tub baths or hot tubs.  You may have numbness for several hours from the local anesthetics. Please use caution and common sense, especially with weight-bearing activities.    CARE OF THE INJECTION SITE  If you have soreness or pain apply ice to the area today (20 minutes on and 20 minutes off).  Starting tomorrow, you   Notify the [facility] if you have any of the following: redness, drainage, swelling or fever above 100°F.      MEDICATIONS  Continue to take all routine medications.  Our office may have instructed you to hold some medications. You may restart medications, including blood thinners.

## 2024-03-12 NOTE — INTERVAL H&P NOTE
H&P reviewed. After examining the patient I find no changes in the patients condition since the H&P had been written.    Vitals:    03/12/24 0913   BP: 134/77   Pulse: (!) 53   Resp: 20   Temp: 97.6 °F (36.4 °C)   SpO2: 96%

## 2024-03-13 ENCOUNTER — TELEPHONE (OUTPATIENT)
Dept: NEUROSURGERY | Facility: CLINIC | Age: 64
End: 2024-03-13

## 2024-03-13 NOTE — TELEPHONE ENCOUNTER
03/13/2024- CALLED PT LEFT MESSAGE ON MACHINE TO CONFIRM NEW DATE OF EMG AND INJECTION APPT. DID CONFIRM I WAS PUTTING HIM ON A WAIT LIST IF ANY SOONER APPT. BECOMES AVAILABLE WILL REACH OUT TO MOVE UP APPT.

## 2024-03-19 ENCOUNTER — TELEPHONE (OUTPATIENT)
Dept: PAIN MEDICINE | Facility: CLINIC | Age: 64
End: 2024-03-19

## 2024-03-27 ENCOUNTER — TELEPHONE (OUTPATIENT)
Dept: PAIN MEDICINE | Facility: CLINIC | Age: 64
End: 2024-03-27

## 2024-03-27 ENCOUNTER — HOSPITAL ENCOUNTER (OUTPATIENT)
Dept: GASTROENTEROLOGY | Facility: HOSPITAL | Age: 64
Setting detail: OUTPATIENT SURGERY
Discharge: HOME | End: 2024-03-27
Attending: ANESTHESIOLOGY | Admitting: ANESTHESIOLOGY
Payer: COMMERCIAL

## 2024-03-27 VITALS
WEIGHT: 207 LBS | SYSTOLIC BLOOD PRESSURE: 115 MMHG | HEART RATE: 46 BPM | DIASTOLIC BLOOD PRESSURE: 59 MMHG | RESPIRATION RATE: 18 BRPM | BODY MASS INDEX: 29.63 KG/M2 | OXYGEN SATURATION: 99 % | TEMPERATURE: 97.6 F | HEIGHT: 70 IN

## 2024-03-27 DIAGNOSIS — M54.16 LUMBAR RADICULOPATHY: ICD-10-CM

## 2024-03-27 PROCEDURE — 64635 DESTROY LUMB/SAC FACET JNT: CPT | Performed by: ANESTHESIOLOGY

## 2024-03-27 PROCEDURE — 64636 DESTROY L/S FACET JNT ADDL: CPT | Performed by: ANESTHESIOLOGY

## 2024-03-27 RX ORDER — LIDOCAINE HYDROCHLORIDE 10 MG/ML
INJECTION, SOLUTION EPIDURAL; INFILTRATION; INTRACAUDAL; PERINEURAL AS NEEDED
Status: DISCONTINUED | OUTPATIENT
Start: 2024-03-27 | End: 2024-03-31 | Stop reason: HOSPADM

## 2024-03-27 RX ORDER — LIDOCAINE HYDROCHLORIDE 20 MG/ML
INJECTION, SOLUTION EPIDURAL; INFILTRATION; INTRACAUDAL; PERINEURAL AS NEEDED
Status: DISCONTINUED | OUTPATIENT
Start: 2024-03-27 | End: 2024-03-31 | Stop reason: HOSPADM

## 2024-03-27 RX ORDER — METHYLPREDNISOLONE ACETATE 80 MG/ML
INJECTION, SUSPENSION INTRA-ARTICULAR; INTRALESIONAL; INTRAMUSCULAR; SOFT TISSUE AS NEEDED
Status: DISCONTINUED | OUTPATIENT
Start: 2024-03-27 | End: 2024-03-31 | Stop reason: HOSPADM

## 2024-03-27 RX ORDER — BUPIVACAINE HYDROCHLORIDE 2.5 MG/ML
INJECTION, SOLUTION EPIDURAL; INFILTRATION; INTRACAUDAL AS NEEDED
Status: DISCONTINUED | OUTPATIENT
Start: 2024-03-27 | End: 2024-03-31 | Stop reason: HOSPADM

## 2024-03-27 RX ADMIN — LIDOCAINE HYDROCHLORIDE 3 ML: 20 INJECTION, SOLUTION EPIDURAL; INFILTRATION; INTRACAUDAL; PERINEURAL at 10:52

## 2024-03-27 RX ADMIN — BUPIVACAINE HYDROCHLORIDE 5 ML: 2.5 INJECTION, SOLUTION EPIDURAL; INFILTRATION; INTRACAUDAL; PERINEURAL at 10:53

## 2024-03-27 RX ADMIN — METHYLPREDNISOLONE ACETATE 80 MG: 80 INJECTION, SUSPENSION INTRA-ARTICULAR; INTRALESIONAL; INTRAMUSCULAR; SOFT TISSUE at 10:54

## 2024-03-27 RX ADMIN — LIDOCAINE HYDROCHLORIDE 10 ML: 10 INJECTION, SOLUTION EPIDURAL; INFILTRATION; INTRACAUDAL; PERINEURAL at 10:49

## 2024-03-27 NOTE — DISCHARGE INSTR - AVS FIRST PAGE
YOUR 2 WEEK FOLLOW UP HAS BEEN SCHEDULED; IF YOU WISH TO CHANGE THE FOLLOW UP, PLEASE CALL THE [facility] AT [place]: [phone]    Lumbar Radiofrequency Ablation   WHAT YOU NEED TO KNOW:   Lumbar radiofrequency ablation (RFA) is a procedure used to treat facet joint pain in your lower back. Facet joints are found at the back of each vertebra. A needle electrode is used to send electrical currents to the nerves in your facet joint. The electrical currents create heat that damages the nerve so it cannot send pain signals.   DISCHARGE INSTRUCTIONS:   Seek care immediately if:   You cannot move your leg.    You cannot control your urine or bowel movements.    You have severe pain in your lower back.    Call your doctor if:   You have leg weakness.     You develop new symptoms.     You have questions or concerns about your condition or care.    Medicines:   Pain medicine  may be given. Ask how to take this medicine safely.    Take your medicine as directed.  Contact your healthcare provider if you think your medicine is not helping or if you have side effects. Tell your provider if you are allergic to any medicine. Keep a list of the medicines, vitamins, and herbs you take. Include the amounts, and when and why you take them. Bring the list or the pill bottles to follow-up visits. Carry your medicine list with you in case of an emergency.    Activity:  Do not drive a car or operate machinery within 24 hours after your procedure. Ask your healthcare provider about any other activities you should avoid.  Follow up with your doctor as directed:  Write down your questions so you remember to ask them during your visits.    RADIO FREQUENCY MEDIAL BRANCH NEUROTOMY DISCHARGE INSTRUCTIONS      ACTIVITY  Do not drive or operate machinery today.  No strenuous activity today - bending, lifting, etc.  You may show today, but do not sit in a tub of water.  Resume normal activities tomorrow as tolerated.    CARE OF  THE INJECTION SITE  If you have soreness or pain, apply ice to the area today (20 minute on/20 minutes off).  Starting tomorrow, you may use warm, moist heat or ice if needed.  Notify the [facility] if you have any of the following: redness, drainage, swelling or fever above 100°F.    SPECIAL INSTRUCTIONS  Our office will call you tomorrow for a progress report and make an appointment for a follow-up visit in 4 weeks.  If you feel a sunburn-like sensation in the area of your procedure, call our office.    MEDICATIONS  Continue to take all routine medications.  Our office may have instructed you to hold some medications.  You may resume _______________________________________________.    If you have any problems specifically related to your procedure, please call our office at [phone]. Problems not related to your procedure should be directed at your primary care physician.

## 2024-03-27 NOTE — INTERVAL H&P NOTE
H&P reviewed. After examining the patient I find no changes in the patients condition since the H&P had been written.    Vitals:    03/27/24 1003   BP: 132/79   Pulse: (!) 52   Resp: 18   Temp: 97.6 °F (36.4 °C)   SpO2: 97%

## 2024-03-28 NOTE — TELEPHONE ENCOUNTER
S/w pt who states that his pain has been improving with no treatment. If pt has pain, he will use tylenol 1000 mg q 8 hrs with no more than 3000 mg/24 hrs and try ice and or heat 20 mins off and on. Pt denied fever, signs of infection, or sunburn like sensation. Aware it may take 4-6 weeks for the full effect of the procedure.     Confirmed f/u appt

## 2024-04-02 ENCOUNTER — HOSPITAL ENCOUNTER (OUTPATIENT)
Dept: NEUROLOGY | Facility: CLINIC | Age: 64
Discharge: HOME | End: 2024-04-02
Payer: COMMERCIAL

## 2024-04-02 DIAGNOSIS — M54.16 LUMBAR RADICULOPATHY: ICD-10-CM

## 2024-04-02 PROCEDURE — 95909 NRV CNDJ TST 5-6 STUDIES: CPT | Performed by: PSYCHIATRY & NEUROLOGY

## 2024-04-02 PROCEDURE — 95886 MUSC TEST DONE W/N TEST COMP: CPT | Performed by: PSYCHIATRY & NEUROLOGY

## 2024-04-10 ENCOUNTER — TELEPHONE (OUTPATIENT)
Dept: NEUROSURGERY | Facility: CLINIC | Age: 64
End: 2024-04-10

## 2024-04-10 NOTE — TELEPHONE ENCOUNTER
Pt called to see if he could move appt up, checked [facility] schedule and no availability. Advised pt he would still be on waitlist

## 2024-04-18 ENCOUNTER — OFFICE VISIT (OUTPATIENT)
Dept: NEUROSURGERY | Facility: CLINIC | Age: 64
End: 2024-04-18
Payer: COMMERCIAL

## 2024-04-18 VITALS
BODY MASS INDEX: 29.63 KG/M2 | TEMPERATURE: 98.2 F | OXYGEN SATURATION: 96 % | WEIGHT: 207 LBS | SYSTOLIC BLOOD PRESSURE: 128 MMHG | DIASTOLIC BLOOD PRESSURE: 86 MMHG | HEART RATE: 53 BPM | HEIGHT: 70 IN | RESPIRATION RATE: 18 BRPM

## 2024-04-18 DIAGNOSIS — Z01.818 PRE-PROCEDURAL EXAMINATION: ICD-10-CM

## 2024-04-18 DIAGNOSIS — M54.16 LUMBAR RADICULOPATHY: Primary | ICD-10-CM

## 2024-04-18 PROCEDURE — 99214 OFFICE O/P EST MOD 30 MIN: CPT | Performed by: STUDENT IN AN ORGANIZED HEALTH CARE EDUCATION/TRAINING PROGRAM

## 2024-04-18 RX ORDER — CHLORHEXIDINE GLUCONATE ORAL RINSE 1.2 MG/ML
15 SOLUTION DENTAL ONCE
OUTPATIENT
Start: 2024-04-18 | End: 2024-04-18

## 2024-04-18 NOTE — PROGRESS NOTES
Assessment/Plan:    64-year-old male presented clinic for evaluation of low back pain and left lower extremity pain in the posterior lateral thigh and posterior lateral leg that has been present for approximately 1 year and progressively getting worse.  On his MRI lumbar spine that showed a far lateral left-sided L5-S1 herniated disc compressing the L5 nerve root. The pain location was peculiar for a L5 radiculopathy.  I ordered an EMG and a left-sided L5-S1 transforaminal LUIS for further evaluation.  Overall he did have dramatic improvement in the left L5-S1 transforaminal LUIS and I stated that this was good evidence that a left-sided L5-S1 far lateral microdiscectomy would treat his symptoms.  I discussed risk and benefits of a left-sided L5-S1 far lateral microdiscectomy, all questions were answered, he agreed to proceed and consent was obtained.  Even though he has had dramatic improvement of the pain he would still like to proceed with surgery as he still has pain in this area and he feels like the LUIS is slowly wearing off as well.  We will proceed with scheduling surgery.    I spent 30 minutes obtaining history and physical exam, reviewing imaging, discussing treatment options, and coordinating care.      Subjective:      Patient ID: Jordi Dias is a 64 y.o. male.    HPI    64-year-old male presented clinic for evaluation of low back pain and left lower extremity pain in the posterior lateral thigh and posterior lateral leg that has been present for approximately 1 year and progressively getting worse.  The pain is 10 out of 10 in severity at its worst.  On his MRI lumbar spine that showed a far lateral left-sided L5-S1 herniated disc compressing the L5 nerve root.  His pain distribution was peculiar for a L5 dermatome.  I ordered an EMG and a left-sided L5-S1 transforaminal LUIS for further evaluation.  He presents to clinic today to discuss results.  EMG did not show any evidence of a left-sided L5  "radiculopathy.  However he did have a left-sided L5-S1 transforaminal LUIS and it was difficult to inject the steroid per the report of the pain management physician.  However he was able to get approximately 0.5 mL of drug in the transforaminal space.  Patient states that since the injection he has had dramatic improvement of his leg pain.    The following portions of the patient's history were reviewed and updated as appropriate: allergies, current medications, past family history, past medical history, past social history, past surgical history, and problem list.    Review of Systems      Objective:      /86 (BP Location: Right arm, Patient Position: Sitting, Cuff Size: Standard)   Pulse (!) 53   Temp 98.2 °F (36.8 °C) (Temporal)   Resp 18   Ht 5' 10\" (1.778 m)   Wt 93.9 kg (207 lb)   SpO2 96%   BMI 29.70 kg/m²          Physical Exam    A&OX3  Gaze conjugate  EOMI  FS  TM  Fcx4  5/5 BUE  5/5 BLE  SILT  "

## 2024-05-02 ENCOUNTER — OFFICE VISIT (OUTPATIENT)
Dept: LAB | Facility: HOSPITAL | Age: 64
End: 2024-05-02
Payer: COMMERCIAL

## 2024-05-02 ENCOUNTER — APPOINTMENT (OUTPATIENT)
Dept: LAB | Facility: HOSPITAL | Age: 64
End: 2024-05-02
Payer: COMMERCIAL

## 2024-05-02 DIAGNOSIS — Z01.818 PRE-PROCEDURAL EXAMINATION: ICD-10-CM

## 2024-05-02 DIAGNOSIS — M54.16 LUMBAR RADICULOPATHY: ICD-10-CM

## 2024-05-02 LAB
ALBUMIN SERPL BCP-MCNC: 3.9 G/DL (ref 3.5–5)
ALP SERPL-CCNC: 49 U/L (ref 34–104)
ALT SERPL W P-5'-P-CCNC: 25 U/L (ref 7–52)
ANION GAP SERPL CALCULATED.3IONS-SCNC: 5 MMOL/L (ref 4–13)
APTT PPP: 38 SECONDS (ref 23–37)
AST SERPL W P-5'-P-CCNC: 22 U/L (ref 13–39)
ATRIAL RATE: 53 BPM
BASOPHILS # BLD AUTO: 0.03 THOUSANDS/ÂΜL (ref 0–0.1)
BASOPHILS NFR BLD AUTO: 1 % (ref 0–1)
BILIRUB SERPL-MCNC: 0.61 MG/DL (ref 0.2–1)
BILIRUB UR QL STRIP: NEGATIVE
BUN SERPL-MCNC: 27 MG/DL (ref 5–25)
CALCIUM SERPL-MCNC: 10.4 MG/DL (ref 8.4–10.2)
CHLORIDE SERPL-SCNC: 104 MMOL/L (ref 96–108)
CLARITY UR: CLEAR
CO2 SERPL-SCNC: 28 MMOL/L (ref 21–32)
COLOR UR: YELLOW
CREAT SERPL-MCNC: 1.54 MG/DL (ref 0.6–1.3)
EOSINOPHIL # BLD AUTO: 0.16 THOUSAND/ÂΜL (ref 0–0.61)
EOSINOPHIL NFR BLD AUTO: 3 % (ref 0–6)
ERYTHROCYTE [DISTWIDTH] IN BLOOD BY AUTOMATED COUNT: 12.3 % (ref 11.6–15.1)
GFR SERPL CREATININE-BSD FRML MDRD: 46 ML/MIN/1.73SQ M
GLUCOSE P FAST SERPL-MCNC: 152 MG/DL (ref 65–99)
GLUCOSE UR STRIP-MCNC: NEGATIVE MG/DL
HCT VFR BLD AUTO: 42 % (ref 36.5–49.3)
HGB BLD-MCNC: 14.4 G/DL (ref 12–17)
HGB UR QL STRIP.AUTO: NEGATIVE
IMM GRANULOCYTES # BLD AUTO: 0.04 THOUSAND/UL (ref 0–0.2)
IMM GRANULOCYTES NFR BLD AUTO: 1 % (ref 0–2)
INR PPP: 0.95 (ref 0.84–1.19)
KETONES UR STRIP-MCNC: NEGATIVE MG/DL
LEUKOCYTE ESTERASE UR QL STRIP: NEGATIVE
LYMPHOCYTES # BLD AUTO: 1.36 THOUSANDS/ÂΜL (ref 0.6–4.47)
LYMPHOCYTES NFR BLD AUTO: 21 % (ref 14–44)
MCH RBC QN AUTO: 31 PG (ref 26.8–34.3)
MCHC RBC AUTO-ENTMCNC: 34.3 G/DL (ref 31.4–37.4)
MCV RBC AUTO: 90 FL (ref 82–98)
MONOCYTES # BLD AUTO: 0.48 THOUSAND/ÂΜL (ref 0.17–1.22)
MONOCYTES NFR BLD AUTO: 8 % (ref 4–12)
NEUTROPHILS # BLD AUTO: 4.33 THOUSANDS/ÂΜL (ref 1.85–7.62)
NEUTS SEG NFR BLD AUTO: 66 % (ref 43–75)
NITRITE UR QL STRIP: NEGATIVE
NRBC BLD AUTO-RTO: 0 /100 WBCS
P AXIS: 65 DEGREES
PH UR STRIP.AUTO: 6 [PH]
PLATELET # BLD AUTO: 226 THOUSANDS/UL (ref 149–390)
PMV BLD AUTO: 9.7 FL (ref 8.9–12.7)
POTASSIUM SERPL-SCNC: 5 MMOL/L (ref 3.5–5.3)
PR INTERVAL: 142 MS
PROT SERPL-MCNC: 6.7 G/DL (ref 6.4–8.4)
PROT UR STRIP-MCNC: NEGATIVE MG/DL
PROTHROMBIN TIME: 13 SECONDS (ref 11.6–14.5)
QRS AXIS: -52 DEGREES
QRSD INTERVAL: 94 MS
QT INTERVAL: 446 MS
QTC INTERVAL: 418 MS
RBC # BLD AUTO: 4.65 MILLION/UL (ref 3.88–5.62)
SODIUM SERPL-SCNC: 137 MMOL/L (ref 135–147)
SP GR UR STRIP.AUTO: >=1.03 (ref 1–1.03)
T WAVE AXIS: 103 DEGREES
UROBILINOGEN UR QL STRIP.AUTO: 0.2 E.U./DL
VENTRICULAR RATE: 53 BPM
WBC # BLD AUTO: 6.4 THOUSAND/UL (ref 4.31–10.16)

## 2024-05-02 PROCEDURE — 85610 PROTHROMBIN TIME: CPT

## 2024-05-02 PROCEDURE — 81003 URINALYSIS AUTO W/O SCOPE: CPT

## 2024-05-02 PROCEDURE — 85025 COMPLETE CBC W/AUTO DIFF WBC: CPT

## 2024-05-02 PROCEDURE — 36415 COLL VENOUS BLD VENIPUNCTURE: CPT

## 2024-05-02 PROCEDURE — 93005 ELECTROCARDIOGRAM TRACING: CPT

## 2024-05-02 PROCEDURE — 80053 COMPREHEN METABOLIC PANEL: CPT

## 2024-05-02 PROCEDURE — 85730 THROMBOPLASTIN TIME PARTIAL: CPT

## 2024-05-02 PROCEDURE — 83036 HEMOGLOBIN GLYCOSYLATED A1C: CPT

## 2024-05-03 LAB
EST. AVERAGE GLUCOSE BLD GHB EST-MCNC: 128 MG/DL
HBA1C MFR BLD: 6.1 %

## 2024-05-08 ENCOUNTER — TELEPHONE (OUTPATIENT)
Age: 64
End: 2024-05-08

## 2024-05-08 DIAGNOSIS — Z01.818 PRE-PROCEDURAL EXAMINATION: ICD-10-CM

## 2024-05-08 DIAGNOSIS — Z01.818 PRE-OP EXAMINATION: ICD-10-CM

## 2024-05-08 DIAGNOSIS — R94.31 ABNORMAL EKG: Primary | ICD-10-CM

## 2024-05-08 NOTE — TELEPHONE ENCOUNTER
Presurgical cardiac clearance requested from PCP.  Referral placed as requested from PCP and surgery scheduler LR.

## 2024-05-14 ENCOUNTER — ANESTHESIA EVENT (OUTPATIENT)
Dept: PERIOP | Facility: HOSPITAL | Age: 64
End: 2024-05-14
Payer: COMMERCIAL

## 2024-05-16 NOTE — PRE-PROCEDURE INSTRUCTIONS
Pre-Surgery Instructions:   Medication Instructions    acetaminophen (TYLENOL) 500 mg tablet Uses PRN- OK to take day of surgery    allopurinol (ZYLOPRIM) 100 mg tablet Uses PRN- OK to take day of surgery    Cholecalciferol 25 MCG (1000 UT) tablet Stop taking 7 days prior to surgery.    lidocaine (LIDODERM) 5 % Stop taking 3 days prior to surgery.    lisinopril (ZESTRIL) 5 mg tablet Hold day of surgery.    Polyvinyl Alcohol-Povidone PF 1.4-0.6 % SOLN Uses PRN- OK to take day of surgery    pravastatin (PRAVACHOL) 40 mg tablet Take day of surgery.    venlafaxine (EFFEXOR-XR) 37.5 mg 24 hr capsule Take night before surgery   Medication instructions for day surgery reviewed. Please use only a sip of water to take your instructed medications. Avoid all over the counter vitamins, supplements and NSAIDS for one week prior to surgery per anesthesia guidelines. Tylenol is ok to take as needed.     You will receive a call one business day prior to surgery with an arrival time and hospital directions. If your surgery is scheduled on a Monday, the hospital will be calling you on the Friday prior to your surgery. If you have not heard from anyone by 8pm, please call the hospital supervisor through the hospital  at [phone]. ([place] [phone] or [place] [phone]).    Do not eat or drink anything after midnight the night before your surgery, including candy, mints, lifesavers, or chewing gum. Do not drink alcohol 24hrs before your surgery. Try not to smoke at least 24hrs before your surgery.       Follow the pre surgery showering instructions as listed in the “My Surgical Experience Booklet” or otherwise provided by your surgeon's office. Do not use a blade to shave the surgical area 1 week before surgery. It is okay to use a clean electric clippers up to 24 hours before surgery. Do not apply any lotions, creams, including makeup, cologne, deodorant, or perfumes after showering on the day of your surgery. Do  not use dry shampoo, hair spray, hair gel, or any type of hair products.     No contact lenses, eye make-up, or artificial eyelashes. Remove nail polish, including gel polish, and any artificial, gel, or acrylic nails if possible. Remove all jewelry including rings and body piercing jewelry.     Wear causal clothing that is easy to take on and off. Consider your type of surgery.    Keep any valuables, jewelry, piercings at home. Please bring any specially ordered equipment (sling, braces) if indicated.    Arrange for a responsible person to drive you to and from the hospital on the day of your surgery. Please confirm the visitor policy for the day of your procedure when you receive your phone call with an arrival time.     Call the surgeon's office with any new illnesses, exposures, or additional questions prior to surgery.    Please reference your “My Surgical Experience Booklet” for additional information to prepare for your upcoming surgery.

## 2024-05-24 ENCOUNTER — DOCUMENTATION (OUTPATIENT)
Dept: NEUROSURGERY | Facility: CLINIC | Age: 64
End: 2024-05-24

## 2024-05-24 NOTE — PROGRESS NOTES
[place] PDMP including surrounding states were searched for patient that is scheduled for surgery Tuesday 5/28/2024.  No current record exists.

## 2024-05-28 ENCOUNTER — ANESTHESIA (OUTPATIENT)
Dept: PERIOP | Facility: HOSPITAL | Age: 64
End: 2024-05-28
Payer: COMMERCIAL

## 2024-05-28 ENCOUNTER — HOSPITAL ENCOUNTER (OUTPATIENT)
Facility: HOSPITAL | Age: 64
Setting detail: OUTPATIENT SURGERY
Discharge: HOME | End: 2024-05-28
Attending: STUDENT IN AN ORGANIZED HEALTH CARE EDUCATION/TRAINING PROGRAM | Admitting: STUDENT IN AN ORGANIZED HEALTH CARE EDUCATION/TRAINING PROGRAM
Payer: COMMERCIAL

## 2024-05-28 ENCOUNTER — APPOINTMENT (OUTPATIENT)
Dept: RADIOLOGY | Facility: HOSPITAL | Age: 64
End: 2024-05-28
Payer: COMMERCIAL

## 2024-05-28 VITALS
BODY MASS INDEX: 29.83 KG/M2 | SYSTOLIC BLOOD PRESSURE: 100 MMHG | WEIGHT: 208.34 LBS | TEMPERATURE: 97.5 F | DIASTOLIC BLOOD PRESSURE: 53 MMHG | HEART RATE: 50 BPM | RESPIRATION RATE: 23 BRPM | OXYGEN SATURATION: 97 % | HEIGHT: 70 IN

## 2024-05-28 DIAGNOSIS — M54.16 LUMBAR RADICULOPATHY: ICD-10-CM

## 2024-05-28 DIAGNOSIS — M54.16 LUMBAR RADICULOPATHY: Primary | ICD-10-CM

## 2024-05-28 PROCEDURE — 72100 X-RAY EXAM L-S SPINE 2/3 VWS: CPT

## 2024-05-28 PROCEDURE — NC001 PR NO CHARGE: Performed by: STUDENT IN AN ORGANIZED HEALTH CARE EDUCATION/TRAINING PROGRAM

## 2024-05-28 PROCEDURE — 63056 DECOMPRESS SPINAL CORD LMBR: CPT | Performed by: STUDENT IN AN ORGANIZED HEALTH CARE EDUCATION/TRAINING PROGRAM

## 2024-05-28 RX ORDER — CYCLOBENZAPRINE HCL 10 MG
10 TABLET ORAL 3 TIMES DAILY PRN
Qty: 21 TABLET | Refills: 0 | Status: SHIPPED | OUTPATIENT
Start: 2024-05-28 | End: 2024-05-29

## 2024-05-28 RX ORDER — LIDOCAINE HYDROCHLORIDE AND EPINEPHRINE 10; 10 MG/ML; UG/ML
INJECTION, SOLUTION INFILTRATION; PERINEURAL AS NEEDED
Status: DISCONTINUED | OUTPATIENT
Start: 2024-05-28 | End: 2024-05-28 | Stop reason: HOSPADM

## 2024-05-28 RX ORDER — FENTANYL CITRATE/PF 50 MCG/ML
25 SYRINGE (ML) INJECTION
Status: DISCONTINUED | OUTPATIENT
Start: 2024-05-28 | End: 2024-05-28 | Stop reason: HOSPADM

## 2024-05-28 RX ORDER — DEXAMETHASONE SODIUM PHOSPHATE 10 MG/ML
INJECTION, SOLUTION INTRAMUSCULAR; INTRAVENOUS AS NEEDED
Status: DISCONTINUED | OUTPATIENT
Start: 2024-05-28 | End: 2024-05-28

## 2024-05-28 RX ORDER — GLYCOPYRROLATE 0.2 MG/ML
INJECTION INTRAMUSCULAR; INTRAVENOUS AS NEEDED
Status: DISCONTINUED | OUTPATIENT
Start: 2024-05-28 | End: 2024-05-28

## 2024-05-28 RX ORDER — CEFAZOLIN SODIUM 2 G/50ML
2000 SOLUTION INTRAVENOUS ONCE
Status: COMPLETED | OUTPATIENT
Start: 2024-05-28 | End: 2024-05-28

## 2024-05-28 RX ORDER — METHYLPREDNISOLONE ACETATE 40 MG/ML
INJECTION, SUSPENSION INTRA-ARTICULAR; INTRALESIONAL; INTRAMUSCULAR; SOFT TISSUE AS NEEDED
Status: DISCONTINUED | OUTPATIENT
Start: 2024-05-28 | End: 2024-05-28 | Stop reason: HOSPADM

## 2024-05-28 RX ORDER — PROPOFOL 10 MG/ML
INJECTION, EMULSION INTRAVENOUS AS NEEDED
Status: DISCONTINUED | OUTPATIENT
Start: 2024-05-28 | End: 2024-05-28

## 2024-05-28 RX ORDER — SODIUM CHLORIDE, SODIUM LACTATE, POTASSIUM CHLORIDE, CALCIUM CHLORIDE 600; 310; 30; 20 MG/100ML; MG/100ML; MG/100ML; MG/100ML
INJECTION, SOLUTION INTRAVENOUS CONTINUOUS PRN
Status: DISCONTINUED | OUTPATIENT
Start: 2024-05-28 | End: 2024-05-28

## 2024-05-28 RX ORDER — SODIUM CHLORIDE, SODIUM LACTATE, POTASSIUM CHLORIDE, CALCIUM CHLORIDE 600; 310; 30; 20 MG/100ML; MG/100ML; MG/100ML; MG/100ML
20 INJECTION, SOLUTION INTRAVENOUS CONTINUOUS
Status: DISCONTINUED | OUTPATIENT
Start: 2024-05-28 | End: 2024-05-28 | Stop reason: HOSPADM

## 2024-05-28 RX ORDER — CEFAZOLIN SODIUM 1 G/3ML
INJECTION, POWDER, FOR SOLUTION INTRAMUSCULAR; INTRAVENOUS AS NEEDED
Status: DISCONTINUED | OUTPATIENT
Start: 2024-05-28 | End: 2024-05-28

## 2024-05-28 RX ORDER — ONDANSETRON 2 MG/ML
4 INJECTION INTRAMUSCULAR; INTRAVENOUS ONCE AS NEEDED
Status: DISCONTINUED | OUTPATIENT
Start: 2024-05-28 | End: 2024-05-28 | Stop reason: HOSPADM

## 2024-05-28 RX ORDER — SODIUM CHLORIDE, SODIUM LACTATE, POTASSIUM CHLORIDE, CALCIUM CHLORIDE 600; 310; 30; 20 MG/100ML; MG/100ML; MG/100ML; MG/100ML
125 INJECTION, SOLUTION INTRAVENOUS CONTINUOUS
Status: DISCONTINUED | OUTPATIENT
Start: 2024-05-28 | End: 2024-05-28 | Stop reason: HOSPADM

## 2024-05-28 RX ORDER — MIDAZOLAM HYDROCHLORIDE 2 MG/2ML
INJECTION, SOLUTION INTRAMUSCULAR; INTRAVENOUS AS NEEDED
Status: DISCONTINUED | OUTPATIENT
Start: 2024-05-28 | End: 2024-05-28

## 2024-05-28 RX ORDER — HYDROMORPHONE HCL IN WATER/PF 6 MG/30 ML
0.2 PATIENT CONTROLLED ANALGESIA SYRINGE INTRAVENOUS
Status: DISCONTINUED | OUTPATIENT
Start: 2024-05-28 | End: 2024-05-28 | Stop reason: HOSPADM

## 2024-05-28 RX ORDER — NEOSTIGMINE METHYLSULFATE 1 MG/ML
INJECTION INTRAVENOUS AS NEEDED
Status: DISCONTINUED | OUTPATIENT
Start: 2024-05-28 | End: 2024-05-28

## 2024-05-28 RX ORDER — FENTANYL CITRATE 50 UG/ML
INJECTION, SOLUTION INTRAMUSCULAR; INTRAVENOUS AS NEEDED
Status: DISCONTINUED | OUTPATIENT
Start: 2024-05-28 | End: 2024-05-28

## 2024-05-28 RX ORDER — ONDANSETRON 2 MG/ML
INJECTION INTRAMUSCULAR; INTRAVENOUS AS NEEDED
Status: DISCONTINUED | OUTPATIENT
Start: 2024-05-28 | End: 2024-05-28

## 2024-05-28 RX ORDER — EPHEDRINE SULFATE 50 MG/ML
INJECTION INTRAVENOUS AS NEEDED
Status: DISCONTINUED | OUTPATIENT
Start: 2024-05-28 | End: 2024-05-28

## 2024-05-28 RX ORDER — CHLORHEXIDINE GLUCONATE ORAL RINSE 1.2 MG/ML
15 SOLUTION DENTAL ONCE
Status: COMPLETED | OUTPATIENT
Start: 2024-05-28 | End: 2024-05-28

## 2024-05-28 RX ORDER — LIDOCAINE HYDROCHLORIDE 10 MG/ML
INJECTION, SOLUTION EPIDURAL; INFILTRATION; INTRACAUDAL; PERINEURAL AS NEEDED
Status: DISCONTINUED | OUTPATIENT
Start: 2024-05-28 | End: 2024-05-28

## 2024-05-28 RX ORDER — OXYCODONE HYDROCHLORIDE AND ACETAMINOPHEN 5; 325 MG/1; MG/1
1-2 TABLET ORAL EVERY 6 HOURS PRN
Qty: 40 TABLET | Refills: 0 | Status: SHIPPED | OUTPATIENT
Start: 2024-05-28 | End: 2024-06-07

## 2024-05-28 RX ORDER — HYDROMORPHONE HCL/PF 1 MG/ML
SYRINGE (ML) INJECTION AS NEEDED
Status: DISCONTINUED | OUTPATIENT
Start: 2024-05-28 | End: 2024-05-28

## 2024-05-28 RX ORDER — ROCURONIUM BROMIDE 10 MG/ML
INJECTION, SOLUTION INTRAVENOUS AS NEEDED
Status: DISCONTINUED | OUTPATIENT
Start: 2024-05-28 | End: 2024-05-28

## 2024-05-28 RX ORDER — PROPOFOL 10 MG/ML
INJECTION, EMULSION INTRAVENOUS CONTINUOUS PRN
Status: DISCONTINUED | OUTPATIENT
Start: 2024-05-28 | End: 2024-05-28

## 2024-05-28 RX ADMIN — LIDOCAINE HYDROCHLORIDE 50 MG: 10 INJECTION, SOLUTION EPIDURAL; INFILTRATION; INTRACAUDAL; PERINEURAL at 10:59

## 2024-05-28 RX ADMIN — ROCURONIUM BROMIDE 10 MG: 10 INJECTION, SOLUTION INTRAVENOUS at 13:18

## 2024-05-28 RX ADMIN — ROCURONIUM BROMIDE 10 MG: 10 INJECTION, SOLUTION INTRAVENOUS at 14:02

## 2024-05-28 RX ADMIN — PROPOFOL 200 MG: 10 INJECTION, EMULSION INTRAVENOUS at 10:59

## 2024-05-28 RX ADMIN — GLYCOPYRROLATE 0.8 MG: 0.2 INJECTION INTRAMUSCULAR; INTRAVENOUS at 15:10

## 2024-05-28 RX ADMIN — ONDANSETRON 4 MG: 2 INJECTION INTRAMUSCULAR; INTRAVENOUS at 11:13

## 2024-05-28 RX ADMIN — DEXAMETHASONE SODIUM PHOSPHATE 10 MG: 10 INJECTION, SOLUTION INTRAMUSCULAR; INTRAVENOUS at 10:59

## 2024-05-28 RX ADMIN — FENTANYL CITRATE 50 MCG: 50 INJECTION, SOLUTION INTRAMUSCULAR; INTRAVENOUS at 10:59

## 2024-05-28 RX ADMIN — HYDROMORPHONE HYDROCHLORIDE 0.5 MG: 1 INJECTION, SOLUTION INTRAMUSCULAR; INTRAVENOUS; SUBCUTANEOUS at 13:11

## 2024-05-28 RX ADMIN — MIDAZOLAM 2 MG: 1 INJECTION INTRAMUSCULAR; INTRAVENOUS at 10:53

## 2024-05-28 RX ADMIN — ROCURONIUM BROMIDE 10 MG: 10 INJECTION, SOLUTION INTRAVENOUS at 14:28

## 2024-05-28 RX ADMIN — FENTANYL CITRATE 50 MCG: 50 INJECTION, SOLUTION INTRAMUSCULAR; INTRAVENOUS at 11:09

## 2024-05-28 RX ADMIN — SODIUM CHLORIDE, SODIUM LACTATE, POTASSIUM CHLORIDE, AND CALCIUM CHLORIDE: .6; .31; .03; .02 INJECTION, SOLUTION INTRAVENOUS at 10:41

## 2024-05-28 RX ADMIN — EPHEDRINE SULFATE 5 MG: 50 INJECTION INTRAVENOUS at 12:13

## 2024-05-28 RX ADMIN — PROPOFOL 70 MCG/KG/MIN: 10 INJECTION, EMULSION INTRAVENOUS at 11:01

## 2024-05-28 RX ADMIN — PHENYLEPHRINE HYDROCHLORIDE 30 MCG/MIN: 10 INJECTION, SOLUTION INTRAVENOUS at 11:00

## 2024-05-28 RX ADMIN — ROCURONIUM BROMIDE 50 MG: 10 INJECTION, SOLUTION INTRAVENOUS at 10:59

## 2024-05-28 RX ADMIN — ROCURONIUM BROMIDE 10 MG: 10 INJECTION, SOLUTION INTRAVENOUS at 14:47

## 2024-05-28 RX ADMIN — HYDROMORPHONE HYDROCHLORIDE 0.5 MG: 1 INJECTION, SOLUTION INTRAMUSCULAR; INTRAVENOUS; SUBCUTANEOUS at 14:47

## 2024-05-28 RX ADMIN — CEFAZOLIN 2000 MG: 1 INJECTION, POWDER, FOR SOLUTION INTRAMUSCULAR; INTRAVENOUS at 14:57

## 2024-05-28 RX ADMIN — NEOSTIGMINE METHYLSULFATE 4 MG: 1 INJECTION INTRAVENOUS at 15:10

## 2024-05-28 RX ADMIN — ROCURONIUM BROMIDE 10 MG: 10 INJECTION, SOLUTION INTRAVENOUS at 12:35

## 2024-05-28 RX ADMIN — EPHEDRINE SULFATE 10 MG: 50 INJECTION INTRAVENOUS at 11:21

## 2024-05-28 RX ADMIN — HYDROMORPHONE HYDROCHLORIDE 0.5 MG: 1 INJECTION, SOLUTION INTRAMUSCULAR; INTRAVENOUS; SUBCUTANEOUS at 12:37

## 2024-05-28 RX ADMIN — ROCURONIUM BROMIDE 10 MG: 10 INJECTION, SOLUTION INTRAVENOUS at 12:23

## 2024-05-28 RX ADMIN — HYDROMORPHONE HYDROCHLORIDE 0.5 MG: 1 INJECTION, SOLUTION INTRAMUSCULAR; INTRAVENOUS; SUBCUTANEOUS at 15:14

## 2024-05-28 RX ADMIN — PROPOFOL 10 MG: 10 INJECTION, EMULSION INTRAVENOUS at 12:48

## 2024-05-28 RX ADMIN — CEFAZOLIN SODIUM 2000 MG: 2 SOLUTION INTRAVENOUS at 11:06

## 2024-05-28 RX ADMIN — SODIUM CHLORIDE, SODIUM LACTATE, POTASSIUM CHLORIDE, AND CALCIUM CHLORIDE: .6; .31; .03; .02 INJECTION, SOLUTION INTRAVENOUS at 12:37

## 2024-05-28 RX ADMIN — CHLORHEXIDINE GLUCONATE 15 ML: 1.2 SOLUTION ORAL at 10:26

## 2024-05-28 NOTE — OP NOTE
OPERATIVE REPORT  PATIENT NAME: Jordi Dias    :  1960  MRN: [id number]  Pt Location:  OR ROOM 03    SURGERY DATE: 2024    Surgeons and Role:     * James Hussein MD - Primary    Preop Diagnosis:  Lumbar radiculopathy [M54.16]    Post-Op Diagnosis Codes:     * Lumbar radiculopathy [M54.16]    Procedure(s):  Left L5-S1 far lateral MIS microdiscectomy  Use of intraoperative fluoro    Specimen(s):  * No specimens in log *    Estimated Blood Loss:   Minimal    Drains:  * No LDAs found *    Anesthesia Type:   General    Operative Indications:  Lumbar radiculopathy [M54.16]    Complications:   None    Procedure and Technique:  Patient brought back to main operating room and general endotracheal anesthesia was induced.  Appropriate lines were placed.  Preoperative antibiotics given were Ancef.  He was placed prone on the operating room table all pressure points were appropriately padded.  Lumbar area was prepped and draped in usual sterile fashion timeout was performed.  Intraoperative fluoroscopy was used to confirm the L5-S1 level.  A linear incision approximately 5 cm to the left the lateral of midline was made using 10 blade scalpel over the L5-S1 disc space.  Monopolar electrocautery was used to divide the fascia.  Blunt finger dissection was used to bluntly dissect the paraspinal muscles off the left L5-S1 facet.  The minimally invasive tube system was inserted over the left L5-S1 lateral facet.  Intraoperative fluoroscopy was used to confirm the L5-S1 level.  Monopolar electrocautery was used to remove the soft tissue from the sacral ala and left L5 transverse process.  M8 drill bit was used to remove a small amount of the lateral facet as well as as the inferior portion of the L5 TP the superior portion of the sacral ala.  Bipolar electrocautery was used for hemostasis.  Blunt nerve hook as well as upgoing curette was used to dissect the soft tissue away from the exiting L5 nerve root.  The L5 nerve  root was protected and retracted superiorly.  A large bulging disc was seen once the nerve root was retracted superiorly.  Annulotomy was made with an 11 blade.  The disc material immediately herniated out.  The initial disc herniation was removed using pituitary rongeurs and then the remainder of the disc herniation was removed using down pushing curette, L4 nerve root, and pituitary rongeur.  Once the disc herniation was removed exiting nerve root was noted to be flat and relaxed.  The wound was irrigated with large amount of sterile saline.  Hemostasis was achieved using Floseal and thrombin-soaked, patties.  Depo-Medrol was placed over the left L5 nerve root.  The fascia was closed with 0 Vicryl sutures.  Dermis was closed 2-0 Vicryl sutures.  Skin was closed with Monocryl.  After the procedure counts were performed and all sponge instrument needle counts verified to be correct.  Patient was transported to PACU in stable condition.     I was present for the entire procedure.    Patient Disposition:  PACU         SIGNATURE: James Hussein MD  DATE: May 28, 2024  TIME: 3:17 PM

## 2024-05-28 NOTE — ANESTHESIA POSTPROCEDURE EVALUATION
Post-Op Assessment Note    CV Status:  Stable    Pain management: adequate       Mental Status:  Lethargic and sleepy   Hydration Status:  Stable   PONV Controlled:  None   Airway Patency:  Patent     Post Op Vitals Reviewed: Yes    No anethesia notable event occurred.    Staff: CRNA               BP   156/67   Temp      Pulse  76   Resp      SpO2   98

## 2024-05-28 NOTE — DISCHARGE INSTR - AVS FIRST PAGE
Spine Day Surgery Discharge Instructions    Activity:    1. Do not lift more than 20 pounds for 2 weeks.    Surgical incision care:    1. Keep dressing in place for 3 days.  2. Keep incision dry for 3 days.  3. May allow clean water to flow over incision after 3 days.  4. Do not immerse the incision in water for 4 weeks.  5. After 3 days, incision may be left open to air, but should remain clean.  6. Do not apply any creams or ointments to the incision, unless otherwise instructed by [facility].  7. Contact office if increasing redness, drainage, pain or swelling around the incision.    Postoperative medication:    1. [facility] will provide pain medication for the first 2 weeks after surgery as coordinated with your pain specialist.   2. If [facility] is providing pain medication, please contact office for questions regarding dosage and modifications.  3. If [facility] is not providing pain medication postoperatively, then contact pain specialist for additional instructions and prescriptions.  4. Resume antiplatelet/anticoagulation medication 2 days after surgery, unless you notice new neurological symptoms or bleeding from incision.  5. Do not operate heavy machinery or vehicles while taking sedating medications.

## 2024-05-28 NOTE — ANESTHESIA PREPROCEDURE EVALUATION
Procedure:  LEFT L5-S1 FAR LATERAL MICRODISCECTOMY (Left: Spine Lumbar)    Relevant Problems   ANESTHESIA (within normal limits)      CARDIO   (+) Hypercholesteremia   (+) Hypertension      /RENAL   (+) Kidney disease      MUSCULOSKELETAL   (+) Lumbar spondylosis - Bilateral      NEURO/PSYCH   (+) Anxiety      PULMONARY   (+) Sleep apnea   (-) Smoking   (-) URI (upper respiratory infection)      Orthopedic/Musculoskeletal   (+) Lumbar radiculopathy      Other   (+) CPAP (continuous positive airway pressure) dependence      Physical Exam    Airway    Mallampati score: III  TM Distance: >3 FB  Neck ROM: full     Dental   No notable dental hx     Cardiovascular      Pulmonary      Other Findings      Lab Results   Component Value Date    WBC 6.40 05/02/2024    HGB 14.4 05/02/2024     05/02/2024     Lab Results   Component Value Date    SODIUM 137 05/02/2024    K 5.0 05/02/2024    BUN 27 (H) 05/02/2024    CREATININE 1.54 (H) 05/02/2024    EGFR 46 05/02/2024     Lab Results   Component Value Date    PTT 38 (H) 05/02/2024      Lab Results   Component Value Date    INR 0.95 05/02/2024       Lab Results   Component Value Date    HGBA1C 6.1 (H) 05/02/2024         Anesthesia Plan  ASA Score- 2     Anesthesia Type- general with ASA Monitors.         Additional Monitors:     Airway Plan: ETT.           Plan Factors-Exercise tolerance (METS): >4 METS.    Chart reviewed.   Existing labs reviewed. Patient summary reviewed.    Patient is not a current smoker.      Obstructive sleep apnea risk education given perioperatively.        Induction- intravenous.    Postoperative Plan-         Informed Consent- Anesthetic plan and risks discussed with patient.  I personally reviewed this patient with the CRNA. Discussed and agreed on the Anesthesia Plan with the CRNA..

## 2024-05-28 NOTE — H&P
H&P reviewed. After examining the patient I find no changes in the patients condition since the H&P had been written.    Patient personally seen and examined.  Neurological examination unchanged compared to last office/progress note, with the following exceptions:    There were no vitals taken for this visit.     A&OX3  Gaze conjugate  EOMI  FS  TM  Fcx4  5/5 BUE  5/5 BLE  SILT.  Regular cardiac rate and rhythm.  No respiratory distress.  Abdomen nontender.  Normocephalic.    Post operative instructions and medications have been reviewed with the patient and family.    Assessment and Plan:    All questions have been answered to the patient and family satisfaction.  Plan to proceed with left far lateral L5-S1 microdiscectomy. They are in agreement with proceeding.

## 2024-05-29 ENCOUNTER — TELEPHONE (OUTPATIENT)
Dept: NEUROSURGERY | Facility: CLINIC | Age: 64
End: 2024-05-29

## 2024-05-29 RX ORDER — CYCLOBENZAPRINE HCL 10 MG
10 TABLET ORAL 3 TIMES DAILY PRN
Qty: 21 TABLET | Refills: 0 | Status: SHIPPED | OUTPATIENT
Start: 2024-05-29

## 2024-06-12 ENCOUNTER — OFFICE VISIT (OUTPATIENT)
Age: 64
End: 2024-06-12

## 2024-06-12 VITALS
DIASTOLIC BLOOD PRESSURE: 80 MMHG | RESPIRATION RATE: 20 BRPM | TEMPERATURE: 98.1 F | HEIGHT: 70 IN | BODY MASS INDEX: 29.63 KG/M2 | HEART RATE: 49 BPM | SYSTOLIC BLOOD PRESSURE: 118 MMHG | WEIGHT: 207 LBS | OXYGEN SATURATION: 98 %

## 2024-06-12 DIAGNOSIS — M79.604 RIGHT LEG PAIN: Primary | ICD-10-CM

## 2024-06-12 PROCEDURE — 99024 POSTOP FOLLOW-UP VISIT: CPT | Performed by: STUDENT IN AN ORGANIZED HEALTH CARE EDUCATION/TRAINING PROGRAM

## 2024-06-12 NOTE — PROGRESS NOTES
Ambulatory Visit  Name: Jordi Dias      : 1960      MRN: [id number]  Encounter Provider: James Hussein MD  Encounter Date: 2024   Encounter department: [facility]    Assessment & Plan   Jordi Dias is a 64 y.o. male who presents with a history of low back pain and left lower extremity pain seem to fit an L5 dermatomal distribution with a left-sided L5-S1 far lateral disc herniation who is status post left far lateral L5-S1 microdiscectomy on 2024.  He presents for his 2-week postop visit.  He is doing well.  Incisions clean dry and intact.  Left leg pain is dramatically improved postoperatively.  He has noticed an increase of right-sided pain in the right buttock and right lateral thigh that was starting before surgery but seems to be more prominent now that his left leg pain is dramatically improved.  I reviewed the MRI lumbar spine with him and he does have foraminal stenosis on the right side at L5-S1 as well.  However there is no disc herniation on that side.  However the pain on the right does not fit an L5 dermatomal distribution does not extend past his thigh.  He stated that we can treat it conservatively for now and watch it and if it continues to worsen over the next few weeks then he may need to proceed with a repeat MRI lumbar spine and EMG of the right leg.  I placed a referral to pain management for right-sided L5-S1 transforaminal LUIS to see if this helps with the symptoms.  Return to clinic in 4 weeks for 6-week postop visit.      History of Present Illness     Jordi Dias is a 64 y.o. male who presents with a history of low back pain and left lower extremity pain seem to fit an L5 dermatomal distribution with a left-sided L5-S1 far lateral disc herniation who is status post left far lateral L5-S1 microdiscectomy on 2024.  He presents for his 2-week postop visit.  Incisions clean dry and intact.  His left leg pain is dramatically  "improved postoperatively.  He has noticed that his right side of his back seems to be hurting more and he noticed right-sided pain radiating into his right buttock and right lateral side.  He stated that this was beginning before the surgery but the left leg was more severe and he proceeded with surgery to treat that.  Now that those symptoms have dramatically improved, the right side seems to be becoming more painful.  No numbness, no weakness, no urinary or bowel changes.  I reviewed the MRI lumbar spine with him and he does have degenerative disc disease overall at L5-S1 and foraminal stenosis at this level.  I discussed a referral to pain management for a right L5-S1 transforaminal LUIS to treat.    Review of Systems    Objective     /80 (BP Location: Right arm, Patient Position: Sitting, Cuff Size: Standard)   Pulse (!) 49   Temp 98.1 °F (36.7 °C) (Temporal)   Resp 20   Ht 5' 10\" (1.778 m)   Wt 93.9 kg (207 lb)   SpO2 98%   BMI 29.70 kg/m²     Physical Exam  Administrative Statements   A&OX3  Gaze conjugate  EOMI  FS  TM  Fcx4  5/5 BUE  5/5 BLE  SILT        "

## 2024-06-27 ENCOUNTER — PREP FOR PROCEDURE (OUTPATIENT)
Dept: PAIN MEDICINE | Facility: CLINIC | Age: 64
End: 2024-06-27

## 2024-06-27 DIAGNOSIS — M54.16 LUMBAR RADICULOPATHY: Primary | ICD-10-CM

## 2024-07-08 ENCOUNTER — TELEPHONE (OUTPATIENT)
Dept: NEUROSURGERY | Facility: CLINIC | Age: 64
End: 2024-07-08

## 2024-07-08 ENCOUNTER — OFFICE VISIT (OUTPATIENT)
Dept: NEUROSURGERY | Facility: CLINIC | Age: 64
End: 2024-07-08

## 2024-07-08 VITALS
TEMPERATURE: 98 F | OXYGEN SATURATION: 97 % | SYSTOLIC BLOOD PRESSURE: 122 MMHG | WEIGHT: 207 LBS | BODY MASS INDEX: 29.63 KG/M2 | DIASTOLIC BLOOD PRESSURE: 64 MMHG | RESPIRATION RATE: 18 BRPM | HEART RATE: 43 BPM | HEIGHT: 70 IN

## 2024-07-08 DIAGNOSIS — M54.16 LUMBAR RADICULOPATHY: Primary | ICD-10-CM

## 2024-07-08 PROCEDURE — 99024 POSTOP FOLLOW-UP VISIT: CPT | Performed by: STUDENT IN AN ORGANIZED HEALTH CARE EDUCATION/TRAINING PROGRAM

## 2024-07-08 NOTE — PROGRESS NOTES
Ambulatory Visit  Name: Jordi Dias      : 1960      MRN: [id number]  Encounter Provider: James Hussein MD  Encounter Date: 2024   Encounter department: [facility] [place]    Assessment & Plan   Jordi Dias is a 64 y.o. male who presents with a history of low back pain and left lower extremity pain seem to fit an L5 dermatomal distribution with a left-sided L5-S1 far lateral disc herniation who is status post left far lateral L5-S1 microdiscectomy on 2024.  He presents today for 6-week postop visit.  Incisions clean dry and intact.  His left leg pain is dramatically improved postop.  His right lower extremity pain continues.  The description of his pain on his right lower extremity does not sound like a clear L5 radiculopathy.  I instructed him to continue with the right L5-S1 transforaminal LUIS to see if this helps.  His pain could be SI joint related as well.  Follow-up in 3 weeks after LUIS to discuss results.  If he continues to have the right leg pain the LUIS did not help, would probably proceed with an SI joint injection to see if this helps as well as an EMG and possibly a repeat MRI lumbar spine.    History of Present Illness     Jordi Dias is a 64 y.o. male who presents with a history of low back pain and left lower extremity pain seem to fit an L5 dermatomal distribution with a left-sided L5-S1 far lateral disc herniation who is status post left far lateral L5-S1 microdiscectomy on 2024.  He presents today for 6-week postop visit.  His left leg pain has improved and he does not have any pain in his left leg.  His right lower extremity pain continues.  He has pain in his right lumbar area that radiates down the right buttock and into the lateral thigh.  Does not radiate distally anymore.  He states that this started just before surgery and it stayed about the same.  He does physical therapy exercises at home.  He is scheduled for an LUIS  "tomorrow.    Review of Systems    Objective     /64 (BP Location: Left arm, Patient Position: Sitting, Cuff Size: Standard)   Pulse (!) 43   Temp 98 °F (36.7 °C) (Temporal)   Resp 18   Ht 5' 10\" (1.778 m)   Wt 93.9 kg (207 lb)   SpO2 97%   BMI 29.70 kg/m²     Physical Exam  A&OX3  Gaze conjugate  EOMI  FS  TM  Fcx4  5/5 BUE  5/5 BLE  SILT     Administrative Statements           "

## 2024-07-08 NOTE — TELEPHONE ENCOUNTER
7/8/24 Patient was checked out as per Dr. Hussein he should come back to the office in 3 weeks patient stated at check out he is going on vacation he was not sure about his schedule and will call once he completes his injections for an appointment.

## 2024-07-09 ENCOUNTER — HOSPITAL ENCOUNTER (OUTPATIENT)
Dept: INTERVENTIONAL RADIOLOGY/VASCULAR | Facility: HOSPITAL | Age: 64
Discharge: HOME | End: 2024-07-09
Attending: ANESTHESIOLOGY | Admitting: ANESTHESIOLOGY
Payer: COMMERCIAL

## 2024-07-09 VITALS
BODY MASS INDEX: 29.63 KG/M2 | WEIGHT: 207 LBS | TEMPERATURE: 97 F | HEART RATE: 50 BPM | OXYGEN SATURATION: 99 % | HEIGHT: 70 IN | DIASTOLIC BLOOD PRESSURE: 63 MMHG | RESPIRATION RATE: 18 BRPM | SYSTOLIC BLOOD PRESSURE: 121 MMHG

## 2024-07-09 DIAGNOSIS — M54.16 LUMBAR RADICULOPATHY: ICD-10-CM

## 2024-07-09 PROCEDURE — 62323 NJX INTERLAMINAR LMBR/SAC: CPT | Performed by: ANESTHESIOLOGY

## 2024-07-09 RX ORDER — 0.9 % SODIUM CHLORIDE 0.9 %
VIAL (ML) INJECTION AS NEEDED
Status: COMPLETED | OUTPATIENT
Start: 2024-07-09 | End: 2024-07-09

## 2024-07-09 RX ORDER — METHYLPREDNISOLONE ACETATE 80 MG/ML
INJECTION, SUSPENSION INTRA-ARTICULAR; INTRALESIONAL; INTRAMUSCULAR; PARENTERAL; SOFT TISSUE AS NEEDED
Status: COMPLETED | OUTPATIENT
Start: 2024-07-09 | End: 2024-07-09

## 2024-07-09 RX ORDER — LIDOCAINE HYDROCHLORIDE 10 MG/ML
INJECTION, SOLUTION EPIDURAL; INFILTRATION; INTRACAUDAL; PERINEURAL AS NEEDED
Status: COMPLETED | OUTPATIENT
Start: 2024-07-09 | End: 2024-07-09

## 2024-07-09 RX ADMIN — LIDOCAINE HYDROCHLORIDE 5 ML: 10 INJECTION, SOLUTION EPIDURAL; INFILTRATION; INTRACAUDAL; PERINEURAL at 11:21

## 2024-07-09 RX ADMIN — SODIUM CHLORIDE 3 ML: 9 INJECTION, SOLUTION INTRAMUSCULAR; INTRAVENOUS; SUBCUTANEOUS at 11:22

## 2024-07-09 RX ADMIN — METHYLPREDNISOLONE ACETATE 80 MG: 80 INJECTION, SUSPENSION INTRA-ARTICULAR; INTRALESIONAL; INTRAMUSCULAR; SOFT TISSUE at 11:21

## 2024-07-09 RX ADMIN — IOHEXOL 1 ML: 240 INJECTION, SOLUTION INTRATHECAL; INTRAVASCULAR; INTRAVENOUS; ORAL at 11:21

## 2024-07-09 NOTE — DISCHARGE INSTR - AVS FIRST PAGE
YOUR 2 WEEK FOLLOW UP HAS BEEN SCHEDULED; IF YOU WISH TO CHANGE THE FOLLOW UP, PLEASE CALL THE [facility] AT [place]: [phone]    Epidural Steroid Injection   WHAT YOU NEED TO KNOW:   An epidural steroid injection (LUIS) is a procedure to inject steroid medicine into the epidural space. The epidural space is between your spinal cord and vertebrae. Steroids reduce inflammation and fluid buildup in your spine that may be causing pain. You may be given pain medicine along with the steroids.        DISCHARGE INSTRUCTIONS:   Call your local emergency number (911 in the [place]) if:   You have a seizure.    You have trouble moving your legs.    Seek care immediately if:   Blood soaks through your bandage.    You have a fever or chills, severe back pain, and the procedure area is sensitive to the touch.    You cannot control when you urinate or have a bowel movement.    Call your doctor if:   You have weakness or numbness in your legs.    Your wound is red, swollen, or draining pus.    You have nausea or are vomiting.    Your face or neck is red and you feel warm.    You have more pain than you had before the procedure.    You have swelling in your hands or feet.    You have questions or concerns about your condition or care.    Care for your wound as directed:  You may remove the bandage before you go to bed the day of your procedure. You may take a shower, but do not take a bath for at least 24 hours.   Self-care:   Do not drive,  use machines, or do strenuous activity for 24 hours after your procedure or as directed.     Continue other treatments  as directed. Steroid injections alone will not control your pain. The injections are meant to be used with other treatments, such as physical therapy.    Follow up with your doctor as directed:  Write down your questions so you remember to ask them during your visits.     EPIDURAL STEROID INJECTION DISCHARGE INSTRUCTIONS      ACTIVITY  Do not drive or operate  machinery today.  No strenuous activity today - bending, lifting, etc.   You may resume normal activities starting tomorrow - start slowly and as tolerated.  You may shower today, but not tub baths or hot tubs.  You may have numbness for several hours from the local anesthetics. Please use caution and common sense, especially with weight-bearing activities.    CARE OF THE INJECTION SITE  If you have soreness or pain apply ice to the area today (20 minutes on and 20 minutes off).  Starting tomorrow, you   Notify the [facility] if you have any of the following: redness, drainage, swelling or fever above 100°F.      MEDICATIONS  Continue to take all routine medications.  Our office may have instructed you to hold some medications. You may restart medications, including blood thinners.

## 2024-07-09 NOTE — H&P
Assessment  1. Lumbar radiculopathy  -     IR spine and pain procedure; Standing  -     IR spine and pain procedure  S/p left lateral microdiscetomy at L5-S1 on 5/28/24; presents now with right L5 and S1 radicular pain accompanied by pain limited weakness, numbness and paresthesias. Moderate bilateral foraminal stenosis noted at L5-S1 contributory. Additionally patient was not able to tolerate gabapentin or lyrica due to side effect profile. Failed conservative treatment efforts prior to surgery including PT.    Axial low back pain described primarily by arthritic features.  Aching, nagging, indolent, stabbing, throbbing features in axial low back without radicular components.  5/5 strength bilaterally, negative SLR.  Positive facet loading maneuvers in lumbar spine elicited pain, positive tenderness to palpation over lumbar paraspinal muscles.  Findings correlate with prominent lumbar facet arthropathy seen throughout axial low back on x-ray.  Currently he is neurologically intact without gait instability, saddle anesthesia or bowel/bladder abnormality. Risks, benefits alternative to medial branch blocks and subsequent radiofrequency ablation of successful thoroughly discussed with patient.  Handouts provided questions answered to patient satisfaction.    The patient has been experiencing moderate to severe axial spine pain that is causing functional deficit.  The pain has been present for at least 3 months and is not improving with conservative care. Previously reported the following symptomatology:     Left-sided lumbar radicular pain in the L5 dermatomal distribution accompanied by pain limited weakness numbness and paresthesias.  Patient has not yet participated with PT recently but has in the past. Chronic pain with decreased participation with IADLs over the past 3 months.  Has been taking OTC tylenol in addition to gabapentin and steroids with modest benefit.  5/5 strength bilaterally, positive SLR  left-sided. Reflexes 2+.  Additionally there is positive facet loading, left greater than right. Denies any gait instability, saddle anesthesia. No imaging to review.  Risks, benefits alternatives to epidural steroid injections thoroughly discussed with patient.  Handouts provided questions answered to patient's satisfaction.  Lifestyle modifications extensively discussed including diet, exercise and weight loss in addition to core strengthening.  Will proceed with multimodal pain therapy plan as noted below:    Plan  -ILESI L5-S1; f/u 2 weeks post procedure  -lyrica 50 mg t.i.d. Ordered for patient; has since weaned given side effect profile. counseled regarding sedative effects of taking this medication and provided up titration calendar.  Counseled not to take medication while driving or operating heavy machinery/using stairs  -continue formal physical therapy for lumbar radiculopathy; Physician directed home exercise plan as per [facility] demonstrated and handouts provided that patient plans to participate with for 1 hour, twice a week for the next 6 weeks.     There are risks associated with opioid medications, including dependence, addiction and tolerance. The patient understands and agrees to use these medications only as prescribed. Potential side effects of the medications include, but are not limited to, constipation, drowsiness, addiction, impaired judgment and risk of fatal overdose if not taken as prescribed. The patient was warned against driving while taking sedation medications or operating heavy machinery. The patient voiced understanding. Sharing medications is a felony. At this point in time, the patient is showing no signs of addiction, abuse, diversion or suicidal ideation.     [place] Prescription Drug Monitoring Program report was reviewed and was appropriate      Complete risks and benefits including bleeding, infection, tissue reaction, nerve injury and allergic reaction were discussed. The  approach was demonstrated using models and literature was provided. Verbal and written consent was obtained.     My impressions and treatment recommendations were discussed in detail with the patient who verbalized understanding and had no further questions.  Discharge instructions were provided. I personally saw and examined the patient and I agree with the above discussed plan of care.    No orders of the defined types were placed in this encounter.      History of Present Illness    S/p left lateral microdiscetomy at L5-S1 on 5/28/24; presents now with right L5 and S1 radicular pain accompanied by pain limited weakness, numbness and paresthesias. Moderate bilateral foraminal stenosis noted at L5-S1 contributory. Additionally patient was not able to tolerate gabapentin or lyrica due to side effect profile. Failed conservative treatment efforts prior to surgery including PT.    Jordi Dias is a 64 y.o. male  presenting with with a past medical history of chronic low back pain described primarily as arthritic in nature.  He describes 8/10 low back pain that is worse in the mornings and worse at the end of the day.  The pain is characterized by achy, nagging, indolent, crampy, stabbing pain in his axial low back.  The patient describes that the pain is worse with standing for long periods of time on hard surfaces as well as with walking.  The patient is a very active individual and feels as though this pain compromises his participation with independent activities of daily living. The pain can be debilitating at times and contribute to significant disability, compromising overall activity and independent activities of daily living.  He has tried physical therapy with limited relief of symptoms.  Medications the patient has tried in the past include nsaids, tylenol.  He describes no radicular symptoms and has good strength.  He denies any weakness numbness or paresthesias.    Additionally reports the following  symptomatology:     Previously reported chronic lumbar radicular pain in the left L5 dermatomal distribution since April. Debilitating pain limited weakness numbness and paresthesias accompany the pain. The patient rates the pain at a 8/10 accompanied by electric shock-like shooting features and crampy burning pain in the aforementioned dermatomal distributions.  The pain is worse in the mornings as well as the end of the day; exertion such as walking for long periods of time seems to exacerbate the pain.  The patient can hardly walk more than a few blocks without having debilitating pain.  He tries to maintain an active lifestyle and finds that the current degree of pain seems to compromises his efforts.  The pain significantly impacts independent activities of daily living and contributes to significant disability.  He has attempted physical therapy with exacerbation of the pain.  He has taken naproxen, tylenol as well as gabapentin with limited relief of the pain as well.  He has never tried epidural steroid injections in the past. He denies any bowel or bladder dysfunction/incontinence, saddle anesthesia or gait instability.    I have personally reviewed and/or updated the patient's past medical history, past surgical history, family history, social history, current medications, allergies, and vital signs today.     Review of Systems   Constitutional:  Positive for activity change.   HENT: Negative.     Eyes: Negative.    Respiratory: Negative.     Cardiovascular: Negative.    Gastrointestinal: Negative.    Endocrine: Negative.    Genitourinary: Negative.    Musculoskeletal:  Positive for arthralgias, back pain, gait problem and myalgias.   Skin: Negative.    Allergic/Immunologic: Negative.    Neurological:  Positive for weakness and numbness.   Hematological: Negative.    Psychiatric/Behavioral: Negative.     All other systems reviewed and are negative.      Patient Active Problem List   Diagnosis    Lumbar  spondylosis - Bilateral    Lumbar radiculopathy    CPAP (continuous positive airway pressure) dependence    Hypertension    Sleep apnea    Anxiety    Hypercholesteremia    Kidney disease       Past Medical History:   Diagnosis Date    Anxiety     Arthritis     Colon polyp     CPAP (continuous positive airway pressure) dependence     Hypercholesteremia     Hypertension     Kidney disease     Sleep apnea        Past Surgical History:   Procedure Laterality Date    COLONOSCOPY      EAR SURGERY Left     perforated ear drum    ELBOW SURGERY Left     ligament repair    EPIDURAL BLOCK INJECTION N/A 07/27/2023    Procedure: L5-S1 ILESI;  Surgeon: Melquiades Loving MD;  Location: [facility];  Service: Pain Management     EPIDURAL BLOCK INJECTION Left 08/22/2023    Procedure: LT L5 & S1 TFESI;  Surgeon: Melquiades Loving MD;  Location: [facility];  Service: Pain Management     EPIDURAL BLOCK INJECTION Left 11/07/2023    Procedure: LT L5 & S1 TFESI;  Surgeon: Melquiades Loving MD;  Location: [facility];  Service: Pain Management     IR SPINE AND PAIN PROCEDURE  03/12/2024    IR SPINE AND PAIN PROCEDURE  03/27/2024    NERVE BLOCK Bilateral 12/05/2023    Procedure: BLOCK MEDIAL BRANCH L3, L4, L5 #1;  Surgeon: Melquiades Loving MD;  Location: [facility];  Service: Pain Management     NERVE BLOCK Left 12/28/2023    Procedure: left L5 and S1 transforaminal LUIS;  Surgeon: Melquiades Loving MD;  [facility]: [facility];  Service: Pain Management     NERVE BLOCK Bilateral 02/21/2024    Procedure: B/L L3-L5 MBB #2;  Surgeon: Melquiades Loving MD;  Location: [facility];  Service: Pain Management     POSTERIOR LAMINECTOMY THORACIC AND LUMBAR SPINE Left 5/28/2024    Procedure: LEFT L5-S1 FAR LATERAL MICRODISCECTOMY;  Surgeon: James Hussein MD;  Location:  [facility];  Service: Neurosurgery    REPLACEMENT TOTAL KNEE Bilateral        History reviewed. No pertinent family history.    Social History     Occupational History    Not on file   Tobacco Use    Smoking  "status: Never    Smokeless tobacco: Never   Vaping Use    Vaping status: Never Used   Substance and Sexual Activity    Alcohol use: Yes     Comment: rarely    Drug use: Never    Sexual activity: Yes       Current Outpatient Medications on File Prior to Encounter   Medication Sig    Cholecalciferol 25 MCG (1000 UT) tablet Take 1 tablet by mouth every other day    lisinopril (ZESTRIL) 5 mg tablet Take 10 mg by mouth every morning    pravastatin (PRAVACHOL) 40 mg tablet Take 40 mg by mouth daily    venlafaxine (EFFEXOR-XR) 37.5 mg 24 hr capsule Take 37.5 mg by mouth daily at bedtime    allopurinol (ZYLOPRIM) 100 mg tablet 100 mg if needed (Patient not taking: Reported on 7/8/2024)    cyclobenzaprine (FLEXERIL) 10 mg tablet TAKE 1 TABLET BY MOUTH THREE TIMES A DAY AS NEEDED FOR MUSCLE SPASMS (Patient not taking: Reported on 7/8/2024)    lidocaine (LIDODERM) 5 % Apply 1 patch topically daily as needed Remove & Discard patch within 12 hours or as directed by MD    Polyvinyl Alcohol-Povidone PF 1.4-0.6 % SOLN INSTILL 1 DROP OF REFRESH TEARS IN EACH EYE 5 TIMES A DAY AS NEEDED FOR CHRONIC DRY EYE/BLEPHARITIS     No current facility-administered medications on file prior to encounter.       Allergies   Allergen Reactions    Ibuprofen Other (See Comments)     Kidney issues         Physical Exam    /83   Pulse (!) 50   Temp 98.7 °F (37.1 °C) (Temporal)   Resp 18   Ht 5' 10\" (1.778 m)   Wt 93.9 kg (207 lb)   SpO2 99%   BMI 29.70 kg/m²     Constitutional: normal, well developed, well nourished, alert, in no distress and non-toxic and no overt pain behavior. and overweight  Eyes: anicteric  HEENT: grossly intact  Neck: supple, symmetric, trachea midline and no masses   Pulmonary:even and unlabored  Cardiovascular:No edema or pitting edema present  Skin:Normal without rashes or lesions and well hydrated  Psychiatric:Mood and affect appropriate  Neurologic:Cranial Nerves II-XII grossly intact Sensation grossly intact; " no clonus negative anders's. Reflexes 2+ and brisk. SLR positive left sided. Musculoskeletal:normal gait. 5/5 strength bilaterally with AROM in all extremities. Difficulty with normal heel toe and tip toe walking. Significant pain with lumbar facet loading bilaterally and with lateral spine rotation, left greater than right. ttp over lumbar paraspinal muscles. Negative basilio's test, negative gaenslen's negative SIJ loading bilaterally.    Imaging    MRI LUMBAR SPINE WITHOUT CONTRAST     INDICATION: M54.16: Radiculopathy, lumbar region.     COMPARISON:  None.     TECHNIQUE:  Multiplanar, multisequence imaging of the lumbar spine was performed. .        IMAGE QUALITY:  Diagnostic     FINDINGS:     VERTEBRAL BODIES:  There are 5 lumbar type vertebral bodies.  Normal alignment of the lumbar spine.  No spondylolysis or spondylolisthesis. No scoliosis.  No compression fracture.    Normal marrow signal is identified within the visualized bony   structures.  No discrete marrow lesion.     SACRUM:  Normal signal within the sacrum. No evidence of insufficiency or stress fracture.     DISTAL CORD AND CONUS:  Normal size and signal within the distal cord and conus.     PARASPINAL SOFT TISSUES:  Paraspinal soft tissues are unremarkable.     LOWER THORACIC DISC SPACES:  Normal disc height and signal.  No disc herniation, canal stenosis or foraminal narrowing.     LUMBAR DISC SPACES:     L1-L2:  Normal.     L2-L3: There is disc degeneration and narrowing. There is a bulging annulus. There is facet arthrosis. There is mild mass effect on the thecal sac. There is mild right greater than left foraminal narrowing.     L3-L4: There is a bulging annulus. There is facet arthrosis. There is minimal mass effect on the thecal sac. There is mild foraminal narrowing.     L4-L5: There is a bulging annulus, asymmetric to the right. There is facet arthrosis. There is marginal osteophytosis. There is mild mass effect on the thecal sac. There is  mild right greater left foraminal narrowing. Probable Schmorl's node along the   inferior endplate of L4.     L5-S1: Bulging annulus. Facet arthrosis. Moderate bilateral foraminal narrowing with contact of the exiting nerve roots. No significant canal stenosis.     OTHER FINDINGS:  None.     IMPRESSION:     Degenerative changes of the lumbar spine, as described above.     Moderate bilateral foraminal narrowing is present at L5-S1 with contact of the exiting nerve roots.        Workstation performed: [id number]

## 2024-08-14 ENCOUNTER — TELEPHONE (OUTPATIENT)
Dept: NEUROSURGERY | Facility: CLINIC | Age: 64
End: 2024-08-14

## 2024-08-14 DIAGNOSIS — M54.16 LUMBAR RADICULOPATHY: Primary | ICD-10-CM

## 2024-08-14 DIAGNOSIS — M79.604 RIGHT LEG PAIN: ICD-10-CM

## 2024-08-14 DIAGNOSIS — M47.816 LUMBAR SPONDYLOSIS: ICD-10-CM

## 2024-08-14 NOTE — TELEPHONE ENCOUNTER
Received a call from Joni stating he had his LUIS and unfortunately it was unsuccessful. He would like to know if he can have the EMG and the MRI ordered.     Noted per documentation he was supposed to return after 3 weeks to discuss results of the LUIS. He states at the time of his appt there were no openings so it was just not scheduled.     Advised I would send a message to Dr. Hussein to determine if images can be ordered without coming back first or if he will have to scheduled to return. Will contact him back with a response.

## 2024-08-16 ENCOUNTER — TELEPHONE (OUTPATIENT)
Dept: PAIN MEDICINE | Facility: CLINIC | Age: 64
End: 2024-08-16

## 2024-08-16 ENCOUNTER — PREP FOR PROCEDURE (OUTPATIENT)
Dept: PAIN MEDICINE | Facility: CLINIC | Age: 64
End: 2024-08-16

## 2024-08-16 DIAGNOSIS — M53.3 SI (SACROILIAC) JOINT DYSFUNCTION: Primary | ICD-10-CM

## 2024-08-16 NOTE — TELEPHONE ENCOUNTER
Received the following response from Dr. Hussein:     James Hussein MD   to Me       8/15/24  9:21 PM  Sure can go ahead and order mri lumbar without contrast and emg lower extremities bilateral and pain management referral for SI joint injection eval, and he can see me in clinic to go over results if it can all be done before 9/30/24    Placed all requested orders and contacted [facility] to advise. Left a detailed message instructing him to schedule above studies and then call the office to schedule the OV with Anthony Hussein. Also advised that if the EMG is booked far from now can send an email to request this be moved up. Encouraged a call back with questions or concerns.

## 2024-08-16 NOTE — TELEPHONE ENCOUNTER
----- Message from Melquiades Loving MD sent at 8/16/2024  1:42 PM EDT -----  sure  ----- Message -----  From: Alecia Hwang  Sent: 8/16/2024   1:17 PM EDT  To: Melquiades Loving MD    Patient called and lvm asking if he can get an SI joint inj. Please advise

## 2024-08-19 NOTE — TELEPHONE ENCOUNTER
Pt called in to let Dr. Hussein and nursing know that he made appts for MRI, S&P injections, and his EMG.     They are all visible on mychart. He was told to call him and pass that info along.       Thank you

## 2024-08-21 NOTE — ASSESSMENT & PLAN NOTE
New evaluation of low back pain with left lower extremity pain  Back pain for years with LLE pain since 5/2023. Leg pain is worse than the back. Both are constant, worse with standing or walking. Left-sided low back pain radiates down the buttock and hip to lateral thigh and posterolateral calf, sparing the foot. No RLE complaints or BBI.  Did PT in [place] summer 2023  Previously underwent bilateral L3-5 MBB x 2, left L5-S1, S1 TFESI x 4. Takes Tylenol and uses lidocaine patches  Exam: Midline low back TTP, pain with extension and left-sided rotation, LINDSEY+ bilaterally. RLE 5/5, LLE 5/5 except KF4/5 due to pain. Diminished LT to left lateral thigh and posterior lateral calf, 2+ DTRs, no clonus    Imaging:  MRI lumbar 2/6/24: Degenerative changes of the lumbar spine, as described above. Moderate bilateral foraminal narrowing is present at L5-S1 with contact of the exiting nerve roots    Plan:  Reviewed imaging with patient and Dr. Hussein.  He has degenerative changes in his lumbar spine with a far left lateral disc causing some compression on the L5 nerve. His symptoms are not typical for an L5 radiculopathy.  Recommend following up with Dr. Loving for left L5 TFESI.  If he has relief even if it is not sustained, discussed that he may benefit from discectomy.  Will order left lower extremity EMG to see if his symptoms are stemming from L5.   Also discussed that he may be a candidate for spinal cord stimulator trial if there is no surgical recommendation.  Follow-up once EMG and injection completed with Dr. Hussein for further discussion.  Call sooner with any questions or concerns.   Thanks for coming to see Dr. Gil at [facility] today! Please follow the instructions below as we discussed during your visit:    Start mupirocin ointment twice day to left groin area   Start doxycycline 100mg twice a day with food for 4 weeks    POST-SHAVE BIOPSY WOUND CARE INSTRUCTIONS  -Please leave your bandage on overnight. Starting next day, cleanse your wound with mild soap and water and gently pat dry. Avoid soaking in dishwater or dirty bath water.  -Apply petroleum jelly or Aquaphor to the wound with a clean Q-tip after cleaning it and as needed throughout the day to keep the wound moist and prevent a scab from forming.  -Cover the wound with a Band-Aid or appropriate non-stick dressing while healing.  -For pain, you may take acetaminophen/tylenol (extra or regular strength) the first 24 hours.  Do not go over the recommended limit on the bottle.  Avoid ibuprofen containing products or related products (Motrin, Advil, Aleve, aspirin, etc) the first 24 hours and avoid unless OK by your primary care physician.  -Avoid any trauma of activity that may open your surgical wound.    Notify your physician if you have:  Bleeding that does not stop after 20 minutes of continuous pressure.  Yellowish/greenish discharge from the treated area  Increasing tenderness or pain  Warmth of the area and/or fever over 101 F  Red streaks up the arm or leg close to the treated area    Please allow 7-10 business days for your physician to call you with the biopsy results

## 2024-09-03 ENCOUNTER — HOSPITAL ENCOUNTER (OUTPATIENT)
Dept: MRI IMAGING | Facility: HOSPITAL | Age: 64
Discharge: HOME | End: 2024-09-03
Attending: STUDENT IN AN ORGANIZED HEALTH CARE EDUCATION/TRAINING PROGRAM
Payer: COMMERCIAL

## 2024-09-03 DIAGNOSIS — M47.816 LUMBAR SPONDYLOSIS: ICD-10-CM

## 2024-09-03 DIAGNOSIS — M79.604 RIGHT LEG PAIN: ICD-10-CM

## 2024-09-03 DIAGNOSIS — M54.16 LUMBAR RADICULOPATHY: ICD-10-CM

## 2024-09-03 PROCEDURE — 72148 MRI LUMBAR SPINE W/O DYE: CPT

## 2024-09-04 ENCOUNTER — HOSPITAL ENCOUNTER (OUTPATIENT)
Dept: GASTROENTEROLOGY | Facility: HOSPITAL | Age: 64
Setting detail: OUTPATIENT SURGERY
Discharge: HOME | End: 2024-09-04
Attending: ANESTHESIOLOGY
Payer: COMMERCIAL

## 2024-09-04 VITALS
RESPIRATION RATE: 16 BRPM | HEIGHT: 70 IN | SYSTOLIC BLOOD PRESSURE: 160 MMHG | TEMPERATURE: 97.6 F | BODY MASS INDEX: 29.63 KG/M2 | OXYGEN SATURATION: 98 % | DIASTOLIC BLOOD PRESSURE: 77 MMHG | WEIGHT: 207 LBS | HEART RATE: 50 BPM

## 2024-09-04 DIAGNOSIS — M53.3 SI (SACROILIAC) JOINT DYSFUNCTION: ICD-10-CM

## 2024-09-04 PROCEDURE — 64483 NJX AA&/STRD TFRM EPI L/S 1: CPT | Performed by: ANESTHESIOLOGY

## 2024-09-04 RX ORDER — LIDOCAINE HYDROCHLORIDE 10 MG/ML
INJECTION, SOLUTION EPIDURAL; INFILTRATION; INTRACAUDAL; PERINEURAL AS NEEDED
Status: COMPLETED | OUTPATIENT
Start: 2024-09-04 | End: 2024-09-04

## 2024-09-04 RX ORDER — BETAMETHASONE SODIUM PHOSPHATE AND BETAMETHASONE ACETATE 3; 3 MG/ML; MG/ML
INJECTION, SUSPENSION INTRA-ARTICULAR; INTRALESIONAL; INTRAMUSCULAR; SOFT TISSUE AS NEEDED
Status: COMPLETED | OUTPATIENT
Start: 2024-09-04 | End: 2024-09-04

## 2024-09-04 RX ADMIN — IOHEXOL 2 ML: 240 INJECTION, SOLUTION INTRATHECAL; INTRAVASCULAR; INTRAVENOUS; ORAL at 11:37

## 2024-09-04 RX ADMIN — LIDOCAINE HYDROCHLORIDE 10 ML: 10 INJECTION, SOLUTION EPIDURAL; INFILTRATION; INTRACAUDAL; PERINEURAL at 11:34

## 2024-09-04 RX ADMIN — BETAMETHASONE SODIUM PHOSPHATE AND BETAMETHASONE ACETATE 30 MG: 3; 3 INJECTION, SUSPENSION INTRA-ARTICULAR; INTRALESIONAL; INTRAMUSCULAR at 11:35

## 2024-09-04 NOTE — DISCHARGE INSTR - AVS FIRST PAGE
YOUR 2 WEEK FOLLOW UP HAS BEEN SCHEDULED; IF YOU WISH TO CHANGE THE FOLLOW UP, PLEASE CALL THE [facility] AT [place]: [phone]    Epidural Steroid Injection   WHAT YOU NEED TO KNOW:   An epidural steroid injection (LUIS) is a procedure to inject steroid medicine into the epidural space. The epidural space is between your spinal cord and vertebrae. Steroids reduce inflammation and fluid buildup in your spine that may be causing pain. You may be given pain medicine along with the steroids.        DISCHARGE INSTRUCTIONS:   Call your local emergency number (911 in the [place]) if:   You have a seizure.    You have trouble moving your legs.    Seek care immediately if:   Blood soaks through your bandage.    You have a fever or chills, severe back pain, and the procedure area is sensitive to the touch.    You cannot control when you urinate or have a bowel movement.    Call your doctor if:   You have weakness or numbness in your legs.    Your wound is red, swollen, or draining pus.    You have nausea or are vomiting.    Your face or neck is red and you feel warm.    You have more pain than you had before the procedure.    You have swelling in your hands or feet.    You have questions or concerns about your condition or care.    Care for your wound as directed:  You may remove the bandage before you go to bed the day of your procedure. You may take a shower, but do not take a bath for at least 24 hours.   Self-care:   Do not drive,  use machines, or do strenuous activity for 24 hours after your procedure or as directed.     Continue other treatments  as directed. Steroid injections alone will not control your pain. The injections are meant to be used with other treatments, such as physical therapy.    Follow up with your doctor as directed:  Write down your questions so you remember to ask them during your visits.     EPIDURAL STEROID INJECTION DISCHARGE INSTRUCTIONS      ACTIVITY  Do not drive or operate  machinery today.  No strenuous activity today - bending, lifting, etc.   You may resume normal activities starting tomorrow - start slowly and as tolerated.  You may shower today, but not tub baths or hot tubs.  You may have numbness for several hours from the local anesthetics. Please use caution and common sense, especially with weight-bearing activities.    CARE OF THE INJECTION SITE  If you have soreness or pain apply ice to the area today (20 minutes on and 20 minutes off).  Starting tomorrow, you may resume normal activities  Notify the [facility] if you have any of the following: redness, drainage, swelling or fever above 100°F.      MEDICATIONS  Continue to take all routine medications.  Our office may have instructed you to hold some medications. You may restart medications, including blood thinners.

## 2024-09-04 NOTE — H&P
Assessment  1. SI (sacroiliac) joint dysfunction  -     IR spine and pain procedure; Standing  -     IR spine and pain procedure  S/p left lateral microdiscetomy at L5-S1 on 5/28/24; presents now with right L5 and S1 radicular pain accompanied by pain limited weakness, numbness and paresthesias. Moderate bilateral foraminal stenosis noted at L5-S1 contributory. Additionally patient was not able to tolerate gabapentin or lyrica due to side effect profile. Failed conservative treatment efforts prior to surgery including PT.    Axial low back pain described primarily by arthritic features.  Aching, nagging, indolent, stabbing, throbbing features in axial low back without radicular components.  5/5 strength bilaterally, negative SLR.  Positive facet loading maneuvers in lumbar spine elicited pain, positive tenderness to palpation over lumbar paraspinal muscles.  Findings correlate with prominent lumbar facet arthropathy seen throughout axial low back on x-ray.  Currently he is neurologically intact without gait instability, saddle anesthesia or bowel/bladder abnormality. Risks, benefits alternative to medial branch blocks and subsequent radiofrequency ablation of successful thoroughly discussed with patient.  Handouts provided questions answered to patient satisfaction.    The patient has been experiencing moderate to severe axial spine pain that is causing functional deficit.  The pain has been present for at least 3 months and is not improving with conservative care. Previously reported the following symptomatology:     Left-sided lumbar radicular pain in the L5 dermatomal distribution accompanied by pain limited weakness numbness and paresthesias.  Patient has not yet participated with PT recently but has in the past. Chronic pain with decreased participation with IADLs over the past 3 months.  Has been taking OTC tylenol in addition to gabapentin and steroids with modest benefit.  5/5 strength bilaterally,  positive SLR left-sided. Reflexes 2+.  Additionally there is positive facet loading, left greater than right. Denies any gait instability, saddle anesthesia. No imaging to review.  Risks, benefits alternatives to epidural steroid injections thoroughly discussed with patient.  Handouts provided questions answered to patient's satisfaction.  Lifestyle modifications extensively discussed including diet, exercise and weight loss in addition to core strengthening.  Will proceed with multimodal pain therapy plan as noted below:    Plan  -Right L4 and L5 TFESI; f/u 2 weeks post procedure  -lyrica 50 mg t.i.d. Ordered for patient; has since weaned given side effect profile. counseled regarding sedative effects of taking this medication and provided up titration calendar.  Counseled not to take medication while driving or operating heavy machinery/using stairs  -continue formal physical therapy for lumbar radiculopathy; Physician directed home exercise plan as per [employer] demonstrated and handouts provided that patient plans to participate with for 1 hour, twice a week for the next 6 weeks.     There are risks associated with opioid medications, including dependence, addiction and tolerance. The patient understands and agrees to use these medications only as prescribed. Potential side effects of the medications include, but are not limited to, constipation, drowsiness, addiction, impaired judgment and risk of fatal overdose if not taken as prescribed. The patient was warned against driving while taking sedation medications or operating heavy machinery. The patient voiced understanding. Sharing medications is a felony. At this point in time, the patient is showing no signs of addiction, abuse, diversion or suicidal ideation.     [place] Prescription Drug Monitoring Program report was reviewed and was appropriate      Complete risks and benefits including bleeding, infection, tissue reaction, nerve injury and allergic  reaction were discussed. The approach was demonstrated using models and literature was provided. Verbal and written consent was obtained.     My impressions and treatment recommendations were discussed in detail with the patient who verbalized understanding and had no further questions.  Discharge instructions were provided. I personally saw and examined the patient and I agree with the above discussed plan of care.    No orders of the defined types were placed in this encounter.      History of Present Illness    S/p left lateral microdiscetomy at L5-S1 on 5/28/24; presents now with right L5 and S1 radicular pain accompanied by pain limited weakness, numbness and paresthesias. Moderate bilateral foraminal stenosis noted at L5-S1 contributory. Additionally patient was not able to tolerate gabapentin or lyrica due to side effect profile. Failed conservative treatment efforts prior to surgery including PT.    Jordi Dias is a 64 y.o. male  presenting with with a past medical history of chronic low back pain described primarily as arthritic in nature.  He describes 8/10 low back pain that is worse in the mornings and worse at the end of the day.  The pain is characterized by achy, nagging, indolent, crampy, stabbing pain in his axial low back.  The patient describes that the pain is worse with standing for long periods of time on hard surfaces as well as with walking.  The patient is a very active individual and feels as though this pain compromises his participation with independent activities of daily living. The pain can be debilitating at times and contribute to significant disability, compromising overall activity and independent activities of daily living.  He has tried physical therapy with limited relief of symptoms.  Medications the patient has tried in the past include nsaids, tylenol.  He describes no radicular symptoms and has good strength.  He denies any weakness numbness or  paresthesias.    Additionally reports the following symptomatology:     Previously reported chronic lumbar radicular pain in the left L5 dermatomal distribution since April. Debilitating pain limited weakness numbness and paresthesias accompany the pain. The patient rates the pain at a 8/10 accompanied by electric shock-like shooting features and crampy burning pain in the aforementioned dermatomal distributions.  The pain is worse in the mornings as well as the end of the day; exertion such as walking for long periods of time seems to exacerbate the pain.  The patient can hardly walk more than a few blocks without having debilitating pain.  He tries to maintain an active lifestyle and finds that the current degree of pain seems to compromises his efforts.  The pain significantly impacts independent activities of daily living and contributes to significant disability.  He has attempted physical therapy with exacerbation of the pain.  He has taken naproxen, tylenol as well as gabapentin with limited relief of the pain as well.  He has never tried epidural steroid injections in the past. He denies any bowel or bladder dysfunction/incontinence, saddle anesthesia or gait instability.    I have personally reviewed and/or updated the patient's past medical history, past surgical history, family history, social history, current medications, allergies, and vital signs today.     Review of Systems   Constitutional:  Positive for activity change.   HENT: Negative.     Eyes: Negative.    Respiratory: Negative.     Cardiovascular: Negative.    Gastrointestinal: Negative.    Endocrine: Negative.    Genitourinary: Negative.    Musculoskeletal:  Positive for arthralgias, back pain, gait problem and myalgias.   Skin: Negative.    Allergic/Immunologic: Negative.    Neurological:  Positive for weakness and numbness.   Hematological: Negative.    Psychiatric/Behavioral: Negative.     All other systems reviewed and are  negative.      Patient Active Problem List   Diagnosis    Lumbar spondylosis - Bilateral    Lumbar radiculopathy    CPAP (continuous positive airway pressure) dependence    Hypertension    Sleep apnea    Anxiety    Hypercholesteremia    Kidney disease       Past Medical History:   Diagnosis Date    Anxiety     Arthritis     Colon polyp     CPAP (continuous positive airway pressure) dependence     Hypercholesteremia     Hypertension     Kidney disease     Sleep apnea        Past Surgical History:   Procedure Laterality Date    COLONOSCOPY      EAR SURGERY Left     perforated ear drum    ELBOW SURGERY Left     ligament repair    EPIDURAL BLOCK INJECTION N/A 07/27/2023    Procedure: L5-S1 ILESI;  Surgeon: Melquiades Loving MD;  Location: [facility];  Service: Pain Management     EPIDURAL BLOCK INJECTION Left 08/22/2023    Procedure: LT L5 & S1 TFESI;  Surgeon: Melquiades Loving MD;  Location: [facility];  Service: Pain Management     EPIDURAL BLOCK INJECTION Left 11/07/2023    Procedure: LT L5 & S1 TFESI;  Surgeon: Melquiades Loving MD;  Location: [facility];  Service: Pain Management     IR SPINE AND PAIN PROCEDURE  03/12/2024    IR SPINE AND PAIN PROCEDURE  03/27/2024    IR SPINE AND PAIN PROCEDURE  7/9/2024    NERVE BLOCK Bilateral 12/05/2023    Procedure: BLOCK MEDIAL BRANCH L3, L4, L5 #1;  Surgeon: Melquiades Loving MD;  Location: [facility];  Service: Pain Management     NERVE BLOCK Left 12/28/2023    Procedure: left L5 and S1 transforaminal LUIS;  Surgeon: Melquiades Loving MD;  Location: [facility];  Service: Pain Management     NERVE BLOCK Bilateral 02/21/2024    Procedure: B/L L3-L5 MBB #2;  Surgeon: Melquiades Loving MD;  Location: [facility];  Service: Pain Management     POSTERIOR LAMINECTOMY THORACIC AND LUMBAR SPINE Left 5/28/2024    Procedure: LEFT L5-S1 FAR LATERAL MICRODISCECTOMY;  Surgeon: James Hussein MD;  Location:  [facility];  Service: Neurosurgery    REPLACEMENT TOTAL KNEE Bilateral        History reviewed. No  "pertinent family history.    Social History     Occupational History    Not on file   Tobacco Use    Smoking status: Never    Smokeless tobacco: Never   Vaping Use    Vaping status: Never Used   Substance and Sexual Activity    Alcohol use: Yes     Comment: rarely    Drug use: Never    Sexual activity: Yes       Current Outpatient Medications on File Prior to Encounter   Medication Sig    Cholecalciferol 25 MCG (1000 UT) tablet Take 1 tablet by mouth every other day    lisinopril (ZESTRIL) 5 mg tablet Take 10 mg by mouth every morning    Polyvinyl Alcohol-Povidone PF 1.4-0.6 % SOLN INSTILL 1 DROP OF REFRESH TEARS IN EACH EYE 5 TIMES A DAY AS NEEDED FOR CHRONIC DRY EYE/BLEPHARITIS    pravastatin (PRAVACHOL) 40 mg tablet Take 40 mg by mouth daily    venlafaxine (EFFEXOR-XR) 37.5 mg 24 hr capsule Take 37.5 mg by mouth daily at bedtime    allopurinol (ZYLOPRIM) 100 mg tablet 100 mg if needed (Patient not taking: Reported on 7/8/2024)    cyclobenzaprine (FLEXERIL) 10 mg tablet TAKE 1 TABLET BY MOUTH THREE TIMES A DAY AS NEEDED FOR MUSCLE SPASMS (Patient not taking: Reported on 7/8/2024)    lidocaine (LIDODERM) 5 % Apply 1 patch topically daily as needed Remove & Discard patch within 12 hours or as directed by MD    sildenafil (VIAGRA) 100 mg tablet Take 50 mg by mouth     No current facility-administered medications on file prior to encounter.       Allergies   Allergen Reactions    Ibuprofen Other (See Comments)     Kidney issues         Physical Exam    /87   Pulse (!) 49   Temp 97.7 °F (36.5 °C) (Temporal)   Resp 20   Ht 5' 10\" (1.778 m)   Wt 93.9 kg (207 lb)   SpO2 98%   BMI 29.70 kg/m²     Constitutional: normal, well developed, well nourished, alert, in no distress and non-toxic and no overt pain behavior. and overweight  Eyes: anicteric  HEENT: grossly intact  Neck: supple, symmetric, trachea midline and no masses   Pulmonary:even and unlabored  Cardiovascular:No edema or pitting edema " present  Skin:Normal without rashes or lesions and well hydrated  Psychiatric:Mood and affect appropriate  Neurologic:Cranial Nerves II-XII grossly intact Sensation grossly intact; no clonus negative anders's. Reflexes 2+ and brisk. SLR positive left sided. Musculoskeletal:normal gait. 5/5 strength bilaterally with AROM in all extremities. Difficulty with normal heel toe and tip toe walking. Significant pain with lumbar facet loading bilaterally and with lateral spine rotation, left greater than right. ttp over lumbar paraspinal muscles. Negative basilio's test, negative gaenslen's negative SIJ loading bilaterally.    Imaging    MRI LUMBAR SPINE WITHOUT CONTRAST     INDICATION: M54.16: Radiculopathy, lumbar region.     COMPARISON:  None.     TECHNIQUE:  Multiplanar, multisequence imaging of the lumbar spine was performed. .        IMAGE QUALITY:  Diagnostic     FINDINGS:     VERTEBRAL BODIES:  There are 5 lumbar type vertebral bodies.  Normal alignment of the lumbar spine.  No spondylolysis or spondylolisthesis. No scoliosis.  No compression fracture.    Normal marrow signal is identified within the visualized bony   structures.  No discrete marrow lesion.     SACRUM:  Normal signal within the sacrum. No evidence of insufficiency or stress fracture.     DISTAL CORD AND CONUS:  Normal size and signal within the distal cord and conus.     PARASPINAL SOFT TISSUES:  Paraspinal soft tissues are unremarkable.     LOWER THORACIC DISC SPACES:  Normal disc height and signal.  No disc herniation, canal stenosis or foraminal narrowing.     LUMBAR DISC SPACES:     L1-L2:  Normal.     L2-L3: There is disc degeneration and narrowing. There is a bulging annulus. There is facet arthrosis. There is mild mass effect on the thecal sac. There is mild right greater than left foraminal narrowing.     L3-L4: There is a bulging annulus. There is facet arthrosis. There is minimal mass effect on the thecal sac. There is mild foraminal  narrowing.     L4-L5: There is a bulging annulus, asymmetric to the right. There is facet arthrosis. There is marginal osteophytosis. There is mild mass effect on the thecal sac. There is mild right greater left foraminal narrowing. Probable Schmorl's node along the   inferior endplate of L4.     L5-S1: Bulging annulus. Facet arthrosis. Moderate bilateral foraminal narrowing with contact of the exiting nerve roots. No significant canal stenosis.     OTHER FINDINGS:  None.     IMPRESSION:     Degenerative changes of the lumbar spine, as described above.     Moderate bilateral foraminal narrowing is present at L5-S1 with contact of the exiting nerve roots.        Workstation performed: [id number]

## 2024-09-20 ENCOUNTER — OFFICE VISIT (OUTPATIENT)
Dept: PAIN MEDICINE | Facility: CLINIC | Age: 64
End: 2024-09-20
Payer: COMMERCIAL

## 2024-09-20 VITALS
DIASTOLIC BLOOD PRESSURE: 96 MMHG | OXYGEN SATURATION: 94 % | TEMPERATURE: 97.7 F | HEART RATE: 60 BPM | WEIGHT: 211 LBS | SYSTOLIC BLOOD PRESSURE: 132 MMHG | BODY MASS INDEX: 30.21 KG/M2 | HEIGHT: 70 IN | RESPIRATION RATE: 18 BRPM

## 2024-09-20 DIAGNOSIS — M79.604 RIGHT LEG PAIN: ICD-10-CM

## 2024-09-20 DIAGNOSIS — M47.26 OTHER SPONDYLOSIS WITH RADICULOPATHY, LUMBAR REGION: Primary | ICD-10-CM

## 2024-09-20 PROCEDURE — 99214 OFFICE O/P EST MOD 30 MIN: CPT | Performed by: ANESTHESIOLOGY

## 2024-09-20 NOTE — PATIENT INSTRUCTIONS
Patient Education     Spinal Cord Stimulation   Why is this procedure done?   Spinal cord stimulation is also called SCS. It can lessen some types of long term pain. With SCS, the doctor puts small wires called leads near the spinal cord in the area where you are having pain. The leads connect to a device that sends a small electrical current through the leads directly to the nerve fibers. This current disrupts the signal sent to your brain and replaces the feeling of pain with a tingling sensation.  There are 2 stages for the spinal cord stimulator procedure.  Trial stage ? Your doctor will place leads in your back. The leads connect to a stimulator that is on the outside of your body. The current from the stimulator goes through the leads into your body. The trial stage gives you the chance to try different settings on the stimulator to see which works best on your pain. You will be able to decide if your pain is improved and if you are comfortable with the tingling. If it seems to be helping, your doctor may put in a permanent SCS device.  Permanent SCS ? Your doctor will place the leads and the stimulator under your skin through small cuts. Your SCS will have some kind of power source. Some are rechargeable. Others are replaced after 5 to 10 years. Another kind has a power source that is outside of your skin.  What will the results be?   Your SCS may help lower your pain levels. You may be able to take fewer pain drugs to ease your pain. You may be able to do more activity with a lower pain level.  What happens before the procedure?   Your doctor will ask you about your health history. Talk to the doctor about:  All the drugs you are taking. Be sure to include all prescription, over the counter, and herbal supplements. Tell the doctor if you have any drug allergy. Bring a list of drugs you take with you.  Any bleeding problems. Be sure to tell your doctor if you are taking any drugs that may cause bleeding. Some  of these are warfarin, rivaroxaban, apixaban, ticagrelor, clopidogrel, ketorolac, ibuprofen, naproxen, or aspirin. Certain vitamins and herbs, such as garlic and fish oil, may also add to the risk for bleeding. You may need to stop these drugs as well. Talk to your doctor about them.  If you need to stop eating and drinking before your procedure.  If you smoke or drink beer, wine, and mixed drinks (alcohol). Be sure to tell your doctor. You may need to stop smoking and drinking alcohol.  You will not be allowed to drive right away after the procedure. Ask a family member or a friend to drive you home.  What happens during the procedure?   Trial stage  The doctor will numb your back to place the leads. You may also be given a drug to help you relax.  Your doctor will place the wires through a needle into the space around your spinal cord. The other end of the wires connects to the stimulator. The stimulator is outside of your body.  The doctor adjusts the stimulator to send the pulses of electricity through the wires to block your feelings of pain.  This takes about an hour.  Permanent SCS  Once you are in the operating room, the staff will put an IV in your arm to give you fluids and drugs. You will receive a drug to make you sleepy. It will also help you stay pain free during the surgery.  Sometimes, the doctor will give you a special drug to make you numb for the surgery. Other times, you are completely asleep.  The doctor removes the leads from the trial SCS if they are present.  New leads are placed through a small cut or a needle under the skin. One end of the leads is near your spinal cord. The other end of the leads connects to the stimulator.  Your doctor will make another small cut to place the stimulator. The stimulator most often goes in your belly or in your upper butt area.  Your doctor will check to make sure everything is working, close all of your cuts, and cover them with bandages.  This takes 1 to  2 hours.  What happens after the procedure?   You will go to the Recovery Room and the staff will watch you closely.  Within a short time, you will get out of bed to a chair. The staff will help you begin to walk around.  You may have to stay in the hospital overnight or you may be able to go home the same day.  Your doctor will give instructions to not bend, lift heavy objects, or twist while healing.  Light exercise, such as walking, may help to build strength and relieve pain.  What drugs may be needed?   The doctor may order drugs to:  Help with pain  Prevent infection  Soften stool  What problems could happen?   Bleeding  Infection  Paralysis or weakness  Worse pain or no pain relief  Problems with the stimulator or leads  Allergic reaction  Spinal fluid leakage  Headache  Last Reviewed Date   2020-05-13  Consumer Information Use and Disclaimer   This generalized information is a limited summary of diagnosis, treatment, and/or medication information. It is not meant to be comprehensive and should be used as a tool to help the user understand and/or assess potential diagnostic and treatment options. It does NOT include all information about conditions, treatments, medications, side effects, or risks that may apply to a specific patient. It is not intended to be medical advice or a substitute for the medical advice, diagnosis, or treatment of a health care provider based on the health care provider's examination and assessment of a patient’s specific and unique circumstances. Patients must speak with a health care provider for complete information about their health, medical questions, and treatment options, including any risks or benefits regarding use of medications. This information does not endorse any treatments or medications as safe, effective, or approved for treating a specific patient. [employer] and its affiliates disclaim any warranty or liability relating to this information or the use thereof.  The use of this information is governed by the Terms of Use, available at https://www.[employer].com/en/know/clinical-effectiveness-terms   Copyright   Copyright © 2024 [employer] and its affiliates and/or licensors. All rights reserved.  Patient Education     Core Strengthening Exercises on Back or on Hands and Knees   About this topic   Your core muscles are in your chest, back, buttock, and stomach area. They are your abdominal, back, and pelvis muscles. These muscles help keep your body stable when using your arms or legs. They also help with balance and posture. There are many exercises you can do to keep these muscles strong.  If you have back problems like a compression fracture or a ruptured disc, doing some of these exercises could make your problem worse. Some of these exercises may cause lower back pain.  General   Before starting with a program, ask your doctor if you are healthy enough to do these exercises. Your doctor may have you work with a , chiropractor, or physical therapist to make a safe exercise program to meet your needs.  Strengthening Exercises   Strengthening exercises keep your muscles firm and strong. Start by repeating each exercise 2 to 3 times. Work up to doing each exercise 10 times. Try to do the exercises 2 to 3 times each day. Hold each exercise for 3 to 5 seconds. Do all exercises slowly.  Hip lifts ? Lie on your back with your knees bent and feet flat on the floor. Tighten your stomach muscles and push your heels into the floor to lift your buttocks off the floor. Relax.  Pelvic tilts ? Lie on your back with your knees bent and feet flat on the floor. Tighten your stomach muscles and press your lower back down to the floor. Relax.  Straight leg raises lying down ? Lie on your back with one leg straight. Bend your other knee so the foot is flat on the bed. Keeping your leg straight, lift the leg up to the level of your other knee. Lower it back down. Repeat with the  other leg.  Knee flex lying down ? Lie on your back with both knees bent and your feet flat on the floor. Tighten your belly muscles. Raise one leg up and back down as if you are marching in slow motion. Keep belly muscles tight while you move your leg. Switch legs. To make this exercise harder, raise both arms straight up in the air. Tighten your belly muscles. When you raise one leg up, reach the opposite arm over your head. Switch, moving the opposite arm and leg until you have done 10 repetitions on each side.  Abdominal crunches ? Lie on your back with both knees bent. Keep your feet flat on the floor. Place your hands in one of these positions. Try starting with the first position since it is the easiest. As you get better, use the other positions to make it harder.  Crunches with arms at sides.  Crunches with arms across chest.  Crunches with arms behind head. Be careful not to interlock your fingers behind your neck or head while doing crunches. This may add tension to your neck and cause strain.  Look at the ceiling. Tighten your belly muscles and lift your shoulders and upper back off the floor. Breathe out while you are doing this. Lower your shoulders to the floor. Breathe in while you are doing this. Relax your belly muscles all the way before starting another crunch.  Arm and leg lifts on hands and knees ? Start on your hands and knees. With all of these exercises, keep your back as level as possible. If you are having trouble with this, you may want to put a small object on your back such as a book. If it falls off, you are not keeping your back level enough during the exercise.  Lift one arm up to shoulder level and hold. Lower it back down. Now, lift up the other arm and hold.  Lift one leg up and kick it straight out until it is in line with your back and hold. Lower it back down. Now, lift up the other leg and hold.  Lift one arm and the OPPOSITE leg up at the same time and hold. Lower them down.  Now, repeat using the other arm and leg. This is a very hard exercise. It may take time to be able to do this.               What will the results be?   Stronger core  Better balance  More toned belly and back muscles  Easier to do daily activities  Better sports performance  Less low back pain  Helpful tips   Stay active and work out to keep your muscles strong and flexible.  Keep a healthy weight to avoid putting too much stress on your spine. Eat a healthy diet to keep your muscles healthy.  Be sure you do not hold your breath when exercising. This can raise your blood pressure. If you tend to hold your breath, try counting out loud when exercising. If any exercise bothers you, stop right away.  Try walking or cycling at an easy pace for a few minutes to warm up your muscles. Do this again after exercising.  Exercise may be slightly uncomfortable, but you should not have sharp pains. If you do get sharp pains, stop what you are doing. If the sharp pains continue, call your doctor.  Last Reviewed Date   2021-03-18  Consumer Information Use and Disclaimer   This generalized information is a limited summary of diagnosis, treatment, and/or medication information. It is not meant to be comprehensive and should be used as a tool to help the user understand and/or assess potential diagnostic and treatment options. It does NOT include all information about conditions, treatments, medications, side effects, or risks that may apply to a specific patient. It is not intended to be medical advice or a substitute for the medical advice, diagnosis, or treatment of a health care provider based on the health care provider's examination and assessment of a patient’s specific and unique circumstances. Patients must speak with a health care provider for complete information about their health, medical questions, and treatment options, including any risks or benefits regarding use of medications. This information does not endorse any  treatments or medications as safe, effective, or approved for treating a specific patient. [employer] and its affiliates disclaim any warranty or liability relating to this information or the use thereof. The use of this information is governed by the Terms of Use, available at https://www.[employer].com/en/know/clinical-effectiveness-terms   Copyright   Copyright © 2024 [employer] and its affiliates and/or licensors. All rights reserved.

## 2024-09-20 NOTE — PROGRESS NOTES
Assessment  1. Lumbar radiculopathy  -     Ambulatory referral to Spine & Pain Management  2. Right leg pain  -     Ambulatory referral to Spine & Pain Management  3. Lumbar spondylosis - Bilateral  -     Ambulatory referral to Spine & Pain Management  S/p left lateral microdiscetomy at L5-S1 on 5/28/24; presents now with right L5 and S1 radicular pain accompanied by pain limited weakness, numbness and paresthesias. On new MRI, Moderate bilateral foraminal stenosis noted at L5-S1 contributory to compressoin of both L5 nerve roots. No benefit with recent right L4 and L5 TFESI.  Additionally patient was not able to tolerate gabapentin or lyrica due to side effect profile. Failed conservative treatment efforts prior to surgery including PT. Discussed SCS trial.    Axial low back pain described primarily by arthritic features.  Aching, nagging, indolent, stabbing, throbbing features in axial low back without radicular components.  5/5 strength bilaterally, negative SLR.  Positive facet loading maneuvers in lumbar spine elicited pain, positive tenderness to palpation over lumbar paraspinal muscles.  Findings correlate with prominent lumbar facet arthropathy seen throughout axial low back on x-ray.  Currently he is neurologically intact without gait instability, saddle anesthesia or bowel/bladder abnormality. Risks, benefits alternative to medial branch blocks and subsequent radiofrequency ablation of successful thoroughly discussed with patient.  Handouts provided questions answered to patient satisfaction.    The patient has been experiencing moderate to severe axial spine pain that is causing functional deficit.  The pain has been present for at least 3 months and is not improving with conservative care. Previously reported the following symptomatology:     Left-sided lumbar radicular pain in the L5 dermatomal distribution accompanied by pain limited weakness numbness and paresthesias.  Patient has not yet  participated with PT recently but has in the past. Chronic pain with decreased participation with IADLs over the past 3 months.  Has been taking OTC tylenol in addition to gabapentin and steroids with modest benefit.  5/5 strength bilaterally, positive SLR left-sided. Reflexes 2+.  Additionally there is positive facet loading, left greater than right. Denies any gait instability, saddle anesthesia. No imaging to review.  Risks, benefits alternatives to epidural steroid injections thoroughly discussed with patient.  Handouts provided questions answered to patient's satisfaction.  Lifestyle modifications extensively discussed including diet, exercise and weight loss in addition to core strengthening.  Will proceed with multimodal pain therapy plan as noted below:    Plan  -referral NSGY for possible repeat decompression  -f/u after consult with NSGY  -lyrica and gabapentin weaned given side effect profile. counseled regarding sedative effects of taking this medication and provided up titration calendar.  Counseled not to take medication while driving or operating heavy machinery/using stairs  -continue formal physical therapy for lumbar radiculopathy; Physician directed home exercise plan as per [employer] demonstrated and handouts provided that patient plans to participate with for 1 hour, twice a week for the next 6 weeks.     There are risks associated with opioid medications, including dependence, addiction and tolerance. The patient understands and agrees to use these medications only as prescribed. Potential side effects of the medications include, but are not limited to, constipation, drowsiness, addiction, impaired judgment and risk of fatal overdose if not taken as prescribed. The patient was warned against driving while taking sedation medications or operating heavy machinery. The patient voiced understanding. Sharing medications is a felony. At this point in time, the patient is showing no signs of addiction,  abuse, diversion or suicidal ideation.     [place] Prescription Drug Monitoring Program report was reviewed and was appropriate      Complete risks and benefits including bleeding, infection, tissue reaction, nerve injury and allergic reaction were discussed. The approach was demonstrated using models and literature was provided. Verbal and written consent was obtained.     My impressions and treatment recommendations were discussed in detail with the patient who verbalized understanding and had no further questions.  Discharge instructions were provided. I personally saw and examined the patient and I agree with the above discussed plan of care.    No orders of the defined types were placed in this encounter.      History of Present Illness    S/p left lateral microdiscetomy at L5-S1 on 5/28/24; presents now with right L5 and S1 radicular pain accompanied by pain limited weakness, numbness and paresthesias. No benefit with recent right L4 and L5 TFESI.  Additionally patient was not able to tolerate gabapentin or lyrica due to side effect profile. Failed conservative treatment efforts prior to surgery including PT.    Jordi Dias is a 64 y.o. male  presenting with with a past medical history of chronic low back pain described primarily as arthritic in nature.  He describes 8/10 low back pain that is worse in the mornings and worse at the end of the day.  The pain is characterized by achy, nagging, indolent, crampy, stabbing pain in his axial low back.  The patient describes that the pain is worse with standing for long periods of time on hard surfaces as well as with walking.  The patient is a very active individual and feels as though this pain compromises his participation with independent activities of daily living. The pain can be debilitating at times and contribute to significant disability, compromising overall activity and independent activities of daily living.  He has tried physical therapy with  limited relief of symptoms.  Medications the patient has tried in the past include nsaids, tylenol.  He describes no radicular symptoms and has good strength.  He denies any weakness numbness or paresthesias.    Additionally reports the following symptomatology:     Previously reported chronic lumbar radicular pain in the left L5 dermatomal distribution since April. Debilitating pain limited weakness numbness and paresthesias accompany the pain. The patient rates the pain at a 8/10 accompanied by electric shock-like shooting features and crampy burning pain in the aforementioned dermatomal distributions.  The pain is worse in the mornings as well as the end of the day; exertion such as walking for long periods of time seems to exacerbate the pain.  The patient can hardly walk more than a few blocks without having debilitating pain.  He tries to maintain an active lifestyle and finds that the current degree of pain seems to compromises his efforts.  The pain significantly impacts independent activities of daily living and contributes to significant disability.  He has attempted physical therapy with exacerbation of the pain.  He has taken naproxen, tylenol as well as gabapentin with limited relief of the pain as well.  He has never tried epidural steroid injections in the past. He denies any bowel or bladder dysfunction/incontinence, saddle anesthesia or gait instability.    I have personally reviewed and/or updated the patient's past medical history, past surgical history, family history, social history, current medications, allergies, and vital signs today.     Review of Systems   Constitutional:  Positive for activity change.   HENT: Negative.     Eyes: Negative.    Respiratory: Negative.     Cardiovascular: Negative.    Gastrointestinal: Negative.    Endocrine: Negative.    Genitourinary: Negative.    Musculoskeletal:  Positive for arthralgias, back pain, gait problem and myalgias.   Skin: Negative.     Allergic/Immunologic: Negative.    Neurological:  Positive for weakness and numbness.   Hematological: Negative.    Psychiatric/Behavioral: Negative.     All other systems reviewed and are negative.      Patient Active Problem List   Diagnosis    Lumbar spondylosis - Bilateral    Lumbar radiculopathy    CPAP (continuous positive airway pressure) dependence    Hypertension    Sleep apnea    Anxiety    Hypercholesteremia    Kidney disease       Past Medical History:   Diagnosis Date    Anxiety     Arthritis     Colon polyp     CPAP (continuous positive airway pressure) dependence     Hypercholesteremia     Hypertension     Kidney disease     Sleep apnea        Past Surgical History:   Procedure Laterality Date    COLONOSCOPY      EAR SURGERY Left     perforated ear drum    ELBOW SURGERY Left     ligament repair    EPIDURAL BLOCK INJECTION N/A 07/27/2023    Procedure: L5-S1 ILESI;  Surgeon: Melquiades Loving MD;  Location: [facility];  Service: Pain Management     EPIDURAL BLOCK INJECTION Left 08/22/2023    Procedure: LT L5 & S1 TFESI;  Surgeon: Melquiades Loving MD;  Location: [facility];  Service: Pain Management     EPIDURAL BLOCK INJECTION Left 11/07/2023    Procedure: LT L5 & S1 TFESI;  Surgeon: Melquiades Loving MD;  Location: [facility];  Service: Pain Management     IR SPINE AND PAIN PROCEDURE  03/12/2024    IR SPINE AND PAIN PROCEDURE  03/27/2024    IR SPINE AND PAIN PROCEDURE  7/9/2024    IR SPINE AND PAIN PROCEDURE  9/4/2024    NERVE BLOCK Bilateral 12/05/2023    Procedure: BLOCK MEDIAL BRANCH L3, L4, L5 #1;  Surgeon: Melquiades Loving MD;  Location: [facility];  Service: Pain Management     NERVE BLOCK Left 12/28/2023    Procedure: left L5 and S1 transforaminal LUIS;  Surgeon: Melquiades Loving MD;  Location: [facility];  Service: Pain Management     NERVE BLOCK Bilateral 02/21/2024    Procedure: B/L L3-L5 MBB #2;  Surgeon: Melquiades Loving MD;  Location: [facility];  Service: Pain Management     POSTERIOR LAMINECTOMY  "THORACIC AND LUMBAR SPINE Left 5/28/2024    Procedure: LEFT L5-S1 FAR LATERAL MICRODISCECTOMY;  Surgeon: James Hussein MD;  Location:  [facility];  Service: Neurosurgery    REPLACEMENT TOTAL KNEE Bilateral        History reviewed. No pertinent family history.    Social History     Occupational History    Not on file   Tobacco Use    Smoking status: Never    Smokeless tobacco: Never   Vaping Use    Vaping status: Never Used   Substance and Sexual Activity    Alcohol use: Yes     Comment: rarely    Drug use: Never    Sexual activity: Yes       Current Outpatient Medications on File Prior to Visit   Medication Sig    Cholecalciferol 25 MCG (1000 UT) tablet Take 1 tablet by mouth every other day    lidocaine (LIDODERM) 5 % Apply 1 patch topically daily as needed Remove & Discard patch within 12 hours or as directed by MD    lisinopril (ZESTRIL) 5 mg tablet Take 10 mg by mouth every morning    Polyvinyl Alcohol-Povidone PF 1.4-0.6 % SOLN INSTILL 1 DROP OF REFRESH TEARS IN EACH EYE 5 TIMES A DAY AS NEEDED FOR CHRONIC DRY EYE/BLEPHARITIS    pravastatin (PRAVACHOL) 40 mg tablet Take 40 mg by mouth daily    sildenafil (VIAGRA) 100 mg tablet Take 50 mg by mouth    venlafaxine (EFFEXOR-XR) 37.5 mg 24 hr capsule Take 37.5 mg by mouth daily at bedtime    [DISCONTINUED] allopurinol (ZYLOPRIM) 100 mg tablet 100 mg if needed (Patient not taking: Reported on 7/8/2024)    [DISCONTINUED] cyclobenzaprine (FLEXERIL) 10 mg tablet TAKE 1 TABLET BY MOUTH THREE TIMES A DAY AS NEEDED FOR MUSCLE SPASMS (Patient not taking: Reported on 7/8/2024)     No current facility-administered medications on file prior to visit.       Allergies   Allergen Reactions    Ibuprofen Other (See Comments)     Kidney issues         Physical Exam    /96 (BP Location: Left arm, Patient Position: Sitting, Cuff Size: Adult)   Pulse 60   Temp 97.7 °F (36.5 °C)   Resp 18   Ht 5' 10\" (1.778 m)   Wt 95.7 kg (211 lb)   SpO2 94%   BMI 30.28 kg/m² "     Constitutional: normal, well developed, well nourished, alert, in no distress and non-toxic and no overt pain behavior. and overweight  Eyes: anicteric  HEENT: grossly intact  Neck: supple, symmetric, trachea midline and no masses   Pulmonary:even and unlabored  Cardiovascular:No edema or pitting edema present  Skin:Normal without rashes or lesions and well hydrated  Psychiatric:Mood and affect appropriate  Neurologic:Cranial Nerves II-XII grossly intact Sensation grossly intact; no clonus negative anders's. Reflexes 2+ and brisk. SLR positive left sided. Musculoskeletal:normal gait. 5/5 strength bilaterally with AROM in all extremities. Difficulty with normal heel toe and tip toe walking. Significant pain with lumbar facet loading bilaterally and with lateral spine rotation, left greater than right. ttp over lumbar paraspinal muscles. Negative basilio's test, negative gaenslen's negative SIJ loading bilaterally.    Imaging    MRI LUMBAR SPINE WITHOUT CONTRAST     INDICATION: M54.16: Radiculopathy, lumbar region  M79.604: Pain in right leg  M47.816: Spondylosis without myelopathy or radiculopathy, lumbar region.     COMPARISON: 2/6/2024     TECHNIQUE:  Multiplanar, multisequence imaging of the lumbar spine was performed. .        IMAGE QUALITY:  Diagnostic     FINDINGS:     VERTEBRAL BODIES:  There are 5 lumbar type vertebral bodies.  Normal alignment of the lumbar spine.  No spondylolysis or spondylolisthesis. No scoliosis.  No compression fracture.    There is a small chronic Schmorl's node within the inferior endplate of   L4 posteriorly.     SACRUM:  Normal signal within the sacrum. No evidence of insufficiency or stress fracture.     DISTAL CORD AND CONUS:  Normal size and signal within the distal cord and conus.     PARASPINAL SOFT TISSUES:  Paraspinal soft tissues are unremarkable.     LOWER THORACIC DISC SPACES:  Normal disc height and signal.  No disc herniation, canal stenosis or foraminal narrowing.      LUMBAR DISC SPACES:     L1-L2:  Normal.     L2-L3: Disc desiccation and loss of disc height. Mild circumferential annular bulging mild canal stenosis. No foraminal nerve compression despite mild foraminal narrowing on the right.     L3-L4: Mild annular bulging with a small left paramedian annular fissure. Minor facet arthropathy. There is very mild canal stenosis without cauda equina impingement. No foraminal nerve impingement.     L4-L5: Mild diffuse annular bulging slightly asymmetric towards the right. Mild facet arthropathy. There is no canal stenosis. No foraminal nerve impingement.     L5-S1: Disc desiccation and loss of disc height. Mild diffuse annular bulging with disc material extending into the neural foramen bilaterally. No mass effect upon the thecal sac or canal stenosis. Mild bilateral facet arthropathy with small left facet   effusion. Moderate bilateral foraminal narrowing with mild mass effect upon both exiting nerves.     OTHER FINDINGS:  None.     IMPRESSION:     Degenerative disc disease and facet degenerative change from L2-3 through L5-S1 with multilevel minor canal stenosis and foraminal narrowing. Slightly more prominent moderate bilateral foraminal narrowing at the L5-S1 level is unchanged from the prior   examination. Correlate for L5 radiculopathy.

## 2024-09-23 ENCOUNTER — DOCUMENTATION (OUTPATIENT)
Dept: NEUROSURGERY | Facility: CLINIC | Age: 64
End: 2024-09-23

## 2024-09-23 ENCOUNTER — TELEPHONE (OUTPATIENT)
Dept: NEUROSURGERY | Facility: CLINIC | Age: 64
End: 2024-09-23

## 2024-09-23 NOTE — PROGRESS NOTES
Neurosurgery Progress Note    Pt Name: Joni Dias   Pt MRN: [id number]    HPI: 64 y.o. male who presents with a history of low back pain and left lower extremity pain seem to fit an L5 dermatomal distribution with a left-sided L5-S1 far lateral disc herniation who is status post left far lateral L5-S1 microdiscectomy on 5/28/2024.  He is approximately 4 months postop.  Called and spoke to patient via phone for update.  Overall since last clinic visit he has had stable right posterior lateral leg pain that he states now extends into the foot.  He states that it feels like in a similar distribution as before when it was involving his left leg.  He still continues to have no left leg pain and all the pain is in the right now.  He underwent a repeat MRI lumbar spine I discussed the results with him.  Overall it shows stable findings compared to previously.  He tried an epidural steroid injection but it did not help.  I discussed with him that his pain may be due to the foraminal stenosis on the right at L5-S1.  However there is no focal disc herniation here and in order to treat this with surgery would typically require a facetectomy for decompression that would ultimately require a fusion.  He also could potentially try a foraminotomies as well.  I also discussed with him that unfortunately due to personal family issues I am leaving the practice at the end of September in order to move closer to family.  He has not undergone his EMG yet.  I discussed with him that the follow-up will be with my partner after he completes the imaging in order to discuss further treatment options.    PE: Not performed, visit completed over the phone.    A/P: 64 y.o. male who presents with a history of low back pain and left lower extremity pain seem to fit an L5 dermatomal distribution with a left-sided L5-S1 far lateral disc herniation who is status post left far lateral L5-S1 microdiscectomy on 5/28/2024.  He is approximately 4 months  postop. Overall since last clinic visit he has had stable right posterior lateral leg pain that he states now extends into the foot.  He states that it feels like in a similar distribution as before when it was involving his left leg.  MRI overall shows stable findings compared to previously and bilateral L5-S1 foraminal stenosis, worse on the right.  His symptoms could be stemming from the foraminal stenosis at L5-S1 on the right.  He did not improve with epidural steroid injection.  He has yet to undergo his EMG.  I discussed with him that unfortunately I will be leaving the practice at the end of September due to personal family issues and having to move closer to family, and his follow-up once the complete imaging is complete we will be with my partner to discuss further treatment options.

## 2024-11-08 ENCOUNTER — HOSPITAL ENCOUNTER (OUTPATIENT)
Dept: NEUROLOGY | Facility: CLINIC | Age: 64
End: 2024-11-08
Payer: COMMERCIAL

## 2024-11-08 DIAGNOSIS — M47.816 LUMBAR SPONDYLOSIS: ICD-10-CM

## 2024-11-08 DIAGNOSIS — M79.604 RIGHT LEG PAIN: ICD-10-CM

## 2024-11-08 DIAGNOSIS — M54.16 LUMBAR RADICULOPATHY: ICD-10-CM

## 2024-11-08 PROCEDURE — 95911 NRV CNDJ TEST 9-10 STUDIES: CPT

## 2024-11-08 PROCEDURE — 95886 MUSC TEST DONE W/N TEST COMP: CPT

## 2024-12-09 ENCOUNTER — OFFICE VISIT (OUTPATIENT)
Dept: NEUROSURGERY | Facility: CLINIC | Age: 64
End: 2024-12-09
Payer: OTHER GOVERNMENT

## 2024-12-09 VITALS
HEIGHT: 70 IN | BODY MASS INDEX: 30.35 KG/M2 | HEART RATE: 48 BPM | DIASTOLIC BLOOD PRESSURE: 88 MMHG | RESPIRATION RATE: 16 BRPM | OXYGEN SATURATION: 96 % | TEMPERATURE: 98.3 F | SYSTOLIC BLOOD PRESSURE: 144 MMHG | WEIGHT: 212 LBS

## 2024-12-09 DIAGNOSIS — M54.41 CHRONIC RIGHT-SIDED LOW BACK PAIN WITH RIGHT-SIDED SCIATICA: ICD-10-CM

## 2024-12-09 DIAGNOSIS — M54.16 LUMBAR RADICULOPATHY: Primary | ICD-10-CM

## 2024-12-09 DIAGNOSIS — G89.29 CHRONIC RIGHT-SIDED LOW BACK PAIN WITH RIGHT-SIDED SCIATICA: ICD-10-CM

## 2024-12-09 DIAGNOSIS — M47.26 OTHER SPONDYLOSIS WITH RADICULOPATHY, LUMBAR REGION: ICD-10-CM

## 2024-12-09 PROCEDURE — 99215 OFFICE O/P EST HI 40 MIN: CPT | Performed by: STUDENT IN AN ORGANIZED HEALTH CARE EDUCATION/TRAINING PROGRAM

## 2024-12-09 RX ORDER — SODIUM CHLORIDE 9 MG/ML
125 INJECTION, SOLUTION INTRAVENOUS CONTINUOUS
OUTPATIENT
Start: 2024-12-09

## 2024-12-09 RX ORDER — CHLORHEXIDINE GLUCONATE ORAL RINSE 1.2 MG/ML
15 SOLUTION DENTAL ONCE
OUTPATIENT
Start: 2024-12-09 | End: 2024-12-09

## 2024-12-09 RX ORDER — CEFAZOLIN SODIUM 2 G/50ML
2000 SOLUTION INTRAVENOUS ONCE
OUTPATIENT
Start: 2024-12-09 | End: 2024-12-09

## 2024-12-09 NOTE — PROGRESS NOTES
Name: Jordi Dias      : 1960      MRN: [id number]  Encounter Provider: Bunny Wilson MD  Encounter Date: 2024   Encounter department: [facility]  :  Assessment & Plan  Other spondylosis with radiculopathy, lumbar region    Orders:    Ambulatory referral to Neurosurgery    Chronic right-sided low back pain with right-sided sciatica    Orders:    Case request operating room: L5-S1 transforaminal lumbar interbody fusion, possible additional levels, with neuromonitoring and neuronavigation; Standing  This is a 64-year-old male with a recent history of L5-S1 left-sided far lateral microdiscectomy by  presenting for surgical evaluation for right lower back and lower extremity pain.    MRI of his lumbar spine was reviewed which demonstrates diffuse degeneration from L2-S1.  The worst is at L5-S1.  The patient has postsurgical changes in the left far lateral region.  There is significant disc degeneration with disc space height collapse resulting in severe bilateral foraminal stenoses.    I had a long discussion with the patient about his symptoms as well as imaging findings.  I believe the patient's symptoms are stemming from the disease he has at L5-S1.  He has disease at L4-5 which is significant however I do not believe they are contributing to his symptoms.  We discussed treatment options and given the degree of foraminal collapse, I do not believe a foraminotomy would achieve the desired results.  As such my plans to perform an L5-S1 TLIF.    I sat down with the patient and had an extensive discussion about the procedure including the details of the procedure as well as it's risks and benefits.     Risks of the procedure include but are not limited to bleeding, infection, nerve injury which can lead to extremity weakness, numbness, tingling, paralysis, and/or bowel/bladder dysfunction. There is also risk of CSF leak which may require additional  procedures to repair. There is risk of injury to the great retroperitoneal vessels which may require vascular surgery intervention. Major procedure related risks include adjacent segment disease, hardware failure, and the need for further future surgeries. There is also anesthesia related risks which include blood clots in the legs/lungs, heart attack, stroke, coma and death.     The patient provided verbal consent to the surgical procedure and signed the consent form.     Expected postoperative course, including activity restrictions, expected pain and postoperative medication were reviewed.    Given the extensive nature of the procedure, the patient will require at least 2 nights inpatient admission. This is for pain control as well as for adequate physical therapy assessment and intervention. This is to optimize recovery and ensure a safe discharge.     In the meantime, the patient will obtain medical clearance from their primary care physician. The patient is to call the office with any questions.        History of Present Illness   Back and leg pain:  Chronic low back pain. May 2022 began to have severe pain in the left low back and left lower extremity. Had conservative treatment with injections on the left. After the injection the left improved a little bit however the right was causing him issues. He saw Dr. Hussein, who wanted to address the left side first. He underwent a left sided far lateral L5-S1 microdiscectomy. The left sided symptoms completely resolved. Currently pain goes from the right lower back to the buttock to the lateral thigh, leg to the latera aspect of his foot to the toes. Cannot lay on the right side. Standing and walking aggravates the pain. Numbness in the same distribution. Had L4 and L5 right TFESI which did not provide him with any relief.  HPI  Review of Systems   Musculoskeletal:  Positive for back pain (center to right LBP down right buttock into hip and side of leg to foot) and  "gait problem. Negative for myalgias.            Procedure(s):  S/P 5/28/24 Left L5-S1 far lateral MIS microdiscectomy- JULIÁN       Neurological:  Positive for weakness (right leg) and numbness (rigth leg & foot).    I have personally reviewed the MA's review of systems and made changes as necessary.         Objective   /88 (BP Location: Left arm, Patient Position: Sitting)   Pulse (!) 48   Temp 98.3 °F (36.8 °C) (Temporal)   Resp 16   Ht 5' 10\" (1.778 m)   Wt 96.2 kg (212 lb)   SpO2 96%   BMI 30.42 kg/m²     Physical Exam  Neurological Exam  General:  Normal, well developed, not in distress/pain     Skin:   No issues, no rashes noted     Musculoskeletal:   5/5 strength throughout all muscle groups  No tenderness to palpation of the spine       Neurologic Exam:  Awake and alert  Oriented x3  Speech clear and fluent  MCDOWELL   Sensation to light touch and pin prick intact throughout  No anders's  No clonus  2+ patellar reflexes     Gait:   normal gait, normal posture        Administrative Statements   I have spent a total time of 55 minutes in caring for this patient on the day of the visit/encounter including Diagnostic results, Risks and benefits of tx options, Instructions for management, Patient and family education, Impressions, Counseling / Coordination of care, Documenting in the medical record, Reviewing / ordering tests, medicine, procedures  , and Obtaining or reviewing history  .   "

## 2025-01-14 ENCOUNTER — TELEPHONE (OUTPATIENT)
Dept: NEUROSURGERY | Facility: CLINIC | Age: 65
End: 2025-01-14

## 2025-01-14 NOTE — TELEPHONE ENCOUNTER
1/14/25 Message sent to the Surgery in basket bin  received a call today 1/14/25 from Dr. Daisha Godwin she is with the [facility] she is asking if she can please have last office notes, type of surgery and level as she would like to send in clearance for patient for his scheduled surgery she also would like a call back as she has questions about the surgery. Please contact her at [phone] ext [phone] and documents can be sent to [phone].

## 2025-01-15 ENCOUNTER — APPOINTMENT (OUTPATIENT)
Dept: LAB | Facility: HOSPITAL | Age: 65
End: 2025-01-15
Payer: COMMERCIAL

## 2025-01-15 ENCOUNTER — OFFICE VISIT (OUTPATIENT)
Dept: LAB | Facility: HOSPITAL | Age: 65
End: 2025-01-15
Payer: COMMERCIAL

## 2025-01-15 DIAGNOSIS — M54.41 CHRONIC RIGHT-SIDED LOW BACK PAIN WITH RIGHT-SIDED SCIATICA: ICD-10-CM

## 2025-01-15 DIAGNOSIS — M47.26 OTHER SPONDYLOSIS WITH RADICULOPATHY, LUMBAR REGION: ICD-10-CM

## 2025-01-15 DIAGNOSIS — Z01.818 PRE-PROCEDURAL EXAMINATION: ICD-10-CM

## 2025-01-15 DIAGNOSIS — G89.29 CHRONIC RIGHT-SIDED LOW BACK PAIN WITH RIGHT-SIDED SCIATICA: ICD-10-CM

## 2025-01-15 DIAGNOSIS — M54.16 LUMBAR RADICULOPATHY: ICD-10-CM

## 2025-01-15 LAB
ALBUMIN SERPL BCG-MCNC: 4.1 G/DL (ref 3.5–5)
ALP SERPL-CCNC: 48 U/L (ref 34–104)
ALT SERPL W P-5'-P-CCNC: 22 U/L (ref 7–52)
ANION GAP SERPL CALCULATED.3IONS-SCNC: 3 MMOL/L (ref 4–13)
APTT PPP: 34 SECONDS (ref 23–34)
AST SERPL W P-5'-P-CCNC: 21 U/L (ref 13–39)
ATRIAL RATE: 50 BPM
BACTERIA UR QL AUTO: ABNORMAL /HPF
BASOPHILS # BLD AUTO: 0.04 THOUSANDS/ΜL (ref 0–0.1)
BASOPHILS NFR BLD AUTO: 1 % (ref 0–1)
BILIRUB SERPL-MCNC: 0.67 MG/DL (ref 0.2–1)
BILIRUB UR QL STRIP: ABNORMAL
BUN SERPL-MCNC: 24 MG/DL (ref 5–25)
CALCIUM SERPL-MCNC: 10.4 MG/DL (ref 8.4–10.2)
CHLORIDE SERPL-SCNC: 106 MMOL/L (ref 96–108)
CLARITY UR: CLEAR
CO2 SERPL-SCNC: 28 MMOL/L (ref 21–32)
COLOR UR: ABNORMAL
CREAT SERPL-MCNC: 1.42 MG/DL (ref 0.6–1.3)
EOSINOPHIL # BLD AUTO: 0.31 THOUSAND/ΜL (ref 0–0.61)
EOSINOPHIL NFR BLD AUTO: 4 % (ref 0–6)
ERYTHROCYTE [DISTWIDTH] IN BLOOD BY AUTOMATED COUNT: 11.8 % (ref 11.6–15.1)
EST. AVERAGE GLUCOSE BLD GHB EST-MCNC: 120 MG/DL
FINE GRAN CASTS URNS QL MICRO: ABNORMAL /LPF
GFR SERPL CREATININE-BSD FRML MDRD: 51 ML/MIN/1.73SQ M
GLUCOSE SERPL-MCNC: 87 MG/DL (ref 65–140)
GLUCOSE UR STRIP-MCNC: NEGATIVE MG/DL
HBA1C MFR BLD: 5.8 %
HCT VFR BLD AUTO: 45.5 % (ref 36.5–49.3)
HGB BLD-MCNC: 15.6 G/DL (ref 12–17)
HGB UR QL STRIP.AUTO: NEGATIVE
HYALINE CASTS #/AREA URNS LPF: ABNORMAL /LPF
IMM GRANULOCYTES # BLD AUTO: 0.03 THOUSAND/UL (ref 0–0.2)
IMM GRANULOCYTES NFR BLD AUTO: 0 % (ref 0–2)
INR PPP: 1.01 (ref 0.85–1.19)
KETONES UR STRIP-MCNC: ABNORMAL MG/DL
LEUKOCYTE ESTERASE UR QL STRIP: NEGATIVE
LYMPHOCYTES # BLD AUTO: 1.59 THOUSANDS/ΜL (ref 0.6–4.47)
LYMPHOCYTES NFR BLD AUTO: 22 % (ref 14–44)
MCH RBC QN AUTO: 30.4 PG (ref 26.8–34.3)
MCHC RBC AUTO-ENTMCNC: 34.3 G/DL (ref 31.4–37.4)
MCV RBC AUTO: 89 FL (ref 82–98)
MONOCYTES # BLD AUTO: 0.69 THOUSAND/ΜL (ref 0.17–1.22)
MONOCYTES NFR BLD AUTO: 9 % (ref 4–12)
MUCOUS THREADS UR QL AUTO: ABNORMAL
NEUTROPHILS # BLD AUTO: 4.66 THOUSANDS/ΜL (ref 1.85–7.62)
NEUTS SEG NFR BLD AUTO: 64 % (ref 43–75)
NITRITE UR QL STRIP: NEGATIVE
NON-SQ EPI CELLS URNS QL MICRO: ABNORMAL /HPF
NRBC BLD AUTO-RTO: 0 /100 WBCS
P AXIS: 39 DEGREES
PH UR STRIP.AUTO: 6 [PH]
PLATELET # BLD AUTO: 219 THOUSANDS/UL (ref 149–390)
PMV BLD AUTO: 10.1 FL (ref 8.9–12.7)
POTASSIUM SERPL-SCNC: 4.3 MMOL/L (ref 3.5–5.3)
PR INTERVAL: 150 MS
PROT SERPL-MCNC: 6.8 G/DL (ref 6.4–8.4)
PROT UR STRIP-MCNC: ABNORMAL MG/DL
PROTHROMBIN TIME: 13.7 SECONDS (ref 12.3–15)
QRS AXIS: -33 DEGREES
QRSD INTERVAL: 86 MS
QT INTERVAL: 442 MS
QTC INTERVAL: 402 MS
RBC # BLD AUTO: 5.14 MILLION/UL (ref 3.88–5.62)
RBC #/AREA URNS AUTO: ABNORMAL /HPF
SODIUM SERPL-SCNC: 137 MMOL/L (ref 135–147)
SP GR UR STRIP.AUTO: >=1.03 (ref 1–1.03)
T WAVE AXIS: 90 DEGREES
UROBILINOGEN UR QL STRIP.AUTO: 1 E.U./DL
VENTRICULAR RATE: 50 BPM
WBC # BLD AUTO: 7.32 THOUSAND/UL (ref 4.31–10.16)
WBC #/AREA URNS AUTO: ABNORMAL /HPF

## 2025-01-15 PROCEDURE — 93010 ELECTROCARDIOGRAM REPORT: CPT | Performed by: INTERNAL MEDICINE

## 2025-01-15 PROCEDURE — 93005 ELECTROCARDIOGRAM TRACING: CPT

## 2025-01-15 PROCEDURE — 36415 COLL VENOUS BLD VENIPUNCTURE: CPT

## 2025-01-15 PROCEDURE — 80053 COMPREHEN METABOLIC PANEL: CPT

## 2025-01-15 PROCEDURE — 83036 HEMOGLOBIN GLYCOSYLATED A1C: CPT

## 2025-01-15 PROCEDURE — 85730 THROMBOPLASTIN TIME PARTIAL: CPT

## 2025-01-15 PROCEDURE — 87081 CULTURE SCREEN ONLY: CPT

## 2025-01-15 PROCEDURE — 81001 URINALYSIS AUTO W/SCOPE: CPT

## 2025-01-15 PROCEDURE — 85025 COMPLETE CBC W/AUTO DIFF WBC: CPT

## 2025-01-15 PROCEDURE — 85610 PROTHROMBIN TIME: CPT

## 2025-01-15 NOTE — TELEPHONE ENCOUNTER
Faxed medical clearance form which provided all surgery information per Dr Godwin's request. Also called Dr Godwin's office as she was also requesting patients pre-op labs/EKG results in order to review and complete the medical clearance process in order to clear the patient. I advised patient went to get all his pre-op labs/EKG done today (1/15/25), once all is complete and resulted I will fax to her office/attention so that she may clear the patient and fax the form back to our office.

## 2025-01-16 ENCOUNTER — ANESTHESIA EVENT (OUTPATIENT)
Dept: PERIOP | Facility: HOSPITAL | Age: 65
DRG: 451 | End: 2025-01-16
Payer: OTHER GOVERNMENT

## 2025-01-16 ENCOUNTER — LAB REQUISITION (OUTPATIENT)
Dept: LAB | Facility: HOSPITAL | Age: 65
End: 2025-01-16
Payer: OTHER GOVERNMENT

## 2025-01-16 DIAGNOSIS — M54.16 RADICULOPATHY, LUMBAR REGION: ICD-10-CM

## 2025-01-16 DIAGNOSIS — G89.29 OTHER CHRONIC PAIN: ICD-10-CM

## 2025-01-16 DIAGNOSIS — M47.26 OTHER SPONDYLOSIS WITH RADICULOPATHY, LUMBAR REGION: ICD-10-CM

## 2025-01-16 DIAGNOSIS — M54.41 LUMBAGO WITH SCIATICA, RIGHT SIDE: ICD-10-CM

## 2025-01-16 DIAGNOSIS — Z01.818 ENCOUNTER FOR OTHER PREPROCEDURAL EXAMINATION: ICD-10-CM

## 2025-01-16 LAB — MRSA NOSE QL CULT: NORMAL

## 2025-01-16 PROCEDURE — 86900 BLOOD TYPING SEROLOGIC ABO: CPT | Performed by: STUDENT IN AN ORGANIZED HEALTH CARE EDUCATION/TRAINING PROGRAM

## 2025-01-16 PROCEDURE — 86850 RBC ANTIBODY SCREEN: CPT | Performed by: STUDENT IN AN ORGANIZED HEALTH CARE EDUCATION/TRAINING PROGRAM

## 2025-01-16 PROCEDURE — 86901 BLOOD TYPING SEROLOGIC RH(D): CPT | Performed by: STUDENT IN AN ORGANIZED HEALTH CARE EDUCATION/TRAINING PROGRAM

## 2025-01-16 RX ORDER — ACETAMINOPHEN 500 MG
500 TABLET ORAL EVERY 6 HOURS PRN
Status: ON HOLD | COMMUNITY
End: 2025-02-07

## 2025-01-16 NOTE — PRE-PROCEDURE INSTRUCTIONS
Pre-Surgery Instructions:   Medication Instructions    acetaminophen (TYLENOL) 500 mg tablet Uses PRN- OK to take day of surgery    Cholecalciferol 25 MCG (1000 UT) tablet Hold day of surgery.    lidocaine (LIDODERM) 5 % Hold day of surgery.    lisinopril (ZESTRIL) 5 mg tablet Hold day of surgery.    Polyvinyl Alcohol-Povidone PF 1.4-0.6 % SOLN Uses PRN- OK to take day of surgery    pravastatin (PRAVACHOL) 40 mg tablet Take day of surgery.    sildenafil (VIAGRA) 100 mg tablet Uses PRN- DO NOT take day of surgery    venlafaxine (EFFEXOR-XR) 37.5 mg 24 hr capsule Take night before surgery   Medication instructions for day surgery reviewed. Please use only a sip of water to take your instructed medications. Avoid all over the counter vitamins, supplements and NSAIDS for one week prior to surgery per anesthesia guidelines. Tylenol is ok to take as needed.     You will receive a call one business day prior to surgery with an arrival time and hospital directions. If your surgery is scheduled on a Monday, the hospital will be calling you on the Friday prior to your surgery. If you have not heard from anyone by 8pm, please call the hospital supervisor through the hospital  at [phone]. ([place] [phone] or [place] [phone]).    Do not eat or drink anything after midnight the night before your surgery, including candy, mints, lifesavers, or chewing gum. Do not drink alcohol 24hrs before your surgery. Try not to smoke at least 24hrs before your surgery.       Follow the pre surgery showering instructions as listed in the “My Surgical Experience Booklet” or otherwise provided by your surgeon's office. Do not use a blade to shave the surgical area 1 week before surgery. It is okay to use a clean electric clippers up to 24 hours before surgery. Do not apply any lotions, creams, including makeup, cologne, deodorant, or perfumes after showering on the day of your surgery. Do not use dry shampoo, hair spray,  hair gel, or any type of hair products.     No contact lenses, eye make-up, or artificial eyelashes. Remove nail polish, including gel polish, and any artificial, gel, or acrylic nails if possible. Remove all jewelry including rings and body piercing jewelry.     Wear causal clothing that is easy to take on and off. Consider your type of surgery.    Keep any valuables, jewelry, piercings at home. Please bring any specially ordered equipment (sling, braces) if indicated.    Arrange for a responsible person to drive you to and from the hospital on the day of your surgery. Please confirm the visitor policy for the day of your procedure when you receive your phone call with an arrival time.     Call the surgeon's office with any new illnesses, exposures, or additional questions prior to surgery.    Please reference your “My Surgical Experience Booklet” for additional information to prepare for your upcoming surgery.      Confirmed patient received showering instructions, skin cleanser and wipes.

## 2025-01-17 LAB
ABO GROUP BLD: NORMAL
BLD GP AB SCN SERPL QL: NEGATIVE
RH BLD: NEGATIVE
SPECIMEN EXPIRATION DATE: NORMAL

## 2025-01-29 ENCOUNTER — TELEPHONE (OUTPATIENT)
Age: 65
End: 2025-01-29

## 2025-01-29 NOTE — TELEPHONE ENCOUNTER
PT CALLED REQUESTING TO SPEAK WITH [facility] ABOUT UPCOMING NSX 2/5/2025 .    PT WANTED TO DISCUSS THE STATUS OF HIS PW AND GO OVER OTHER PRE-OP QUESTIONS IN PREPARATION FOR NSX.    ATTEMPTED TO TRANSFER PT TO [facility] BUT  WAS UNAVAILABLE.    PLEASE CB PT TO REVIEW PRE-OP QUESTIONS AND DISCUSS PW.      THANK YOU

## 2025-01-30 NOTE — TELEPHONE ENCOUNTER
Called patient back and left message stating I was returning his call. Provided my direct line so that he may call me back directly.

## 2025-02-05 ENCOUNTER — APPOINTMENT (INPATIENT)
Dept: RADIOLOGY | Facility: HOSPITAL | Age: 65
DRG: 451 | End: 2025-02-05
Payer: OTHER GOVERNMENT

## 2025-02-05 ENCOUNTER — HOSPITAL ENCOUNTER (INPATIENT)
Facility: HOSPITAL | Age: 65
LOS: 2 days | Discharge: HOME | DRG: 451 | End: 2025-02-07
Attending: STUDENT IN AN ORGANIZED HEALTH CARE EDUCATION/TRAINING PROGRAM | Admitting: STUDENT IN AN ORGANIZED HEALTH CARE EDUCATION/TRAINING PROGRAM
Payer: OTHER GOVERNMENT

## 2025-02-05 ENCOUNTER — ANESTHESIA (OUTPATIENT)
Dept: PERIOP | Facility: HOSPITAL | Age: 65
DRG: 451 | End: 2025-02-05
Payer: OTHER GOVERNMENT

## 2025-02-05 DIAGNOSIS — Z98.1 S/P LUMBAR FUSION: Primary | ICD-10-CM

## 2025-02-05 LAB
ABO GROUP BLD: NORMAL
RH BLD: NEGATIVE

## 2025-02-05 PROCEDURE — C1781 MESH (IMPLANTABLE): HCPCS | Performed by: STUDENT IN AN ORGANIZED HEALTH CARE EDUCATION/TRAINING PROGRAM

## 2025-02-05 PROCEDURE — 0SG30AJ FUSION OF LUMBOSACRAL JOINT WITH INTERBODY FUSION DEVICE, POSTERIOR APPROACH, ANTERIOR COLUMN, OPEN APPROACH: ICD-10-PCS | Performed by: STUDENT IN AN ORGANIZED HEALTH CARE EDUCATION/TRAINING PROGRAM

## 2025-02-05 PROCEDURE — 01NB0ZZ RELEASE LUMBAR NERVE, OPEN APPROACH: ICD-10-PCS | Performed by: STUDENT IN AN ORGANIZED HEALTH CARE EDUCATION/TRAINING PROGRAM

## 2025-02-05 PROCEDURE — C1713 ANCHOR/SCREW BN/BN,TIS/BN: HCPCS | Performed by: STUDENT IN AN ORGANIZED HEALTH CARE EDUCATION/TRAINING PROGRAM

## 2025-02-05 PROCEDURE — 0SB40ZZ EXCISION OF LUMBOSACRAL DISC, OPEN APPROACH: ICD-10-PCS | Performed by: STUDENT IN AN ORGANIZED HEALTH CARE EDUCATION/TRAINING PROGRAM

## 2025-02-05 PROCEDURE — 72100 X-RAY EXAM L-S SPINE 2/3 VWS: CPT

## 2025-02-05 DEVICE — SCREW 54840047540 4.75 ATS MAS 7.5X40
Type: IMPLANTABLE DEVICE | Site: SPINE LUMBAR | Status: FUNCTIONAL
Brand: CD HORIZON® SPINAL SYSTEM

## 2025-02-05 DEVICE — GRAFT DBF WITH DELIVERY TUBES 12ML: Type: IMPLANTABLE DEVICE | Site: SPINE LUMBAR | Status: FUNCTIONAL

## 2025-02-05 DEVICE — SPACER 6068076 CATALYFT PL LONG 7MM
Type: IMPLANTABLE DEVICE | Site: SPINE LUMBAR | Status: FUNCTIONAL
Brand: CATALYFT PL EXPANDABLE INTERBODY SYSTEM

## 2025-02-05 RX ORDER — METHOCARBAMOL 500 MG/1
750 TABLET, FILM COATED ORAL EVERY 6 HOURS SCHEDULED
Status: DISCONTINUED | OUTPATIENT
Start: 2025-02-05 | End: 2025-02-07 | Stop reason: HOSPADM

## 2025-02-05 RX ORDER — BUPIVACAINE HYDROCHLORIDE 2.5 MG/ML
INJECTION, SOLUTION EPIDURAL; INFILTRATION; INTRACAUDAL AS NEEDED
Status: DISCONTINUED | OUTPATIENT
Start: 2025-02-05 | End: 2025-02-05 | Stop reason: HOSPADM

## 2025-02-05 RX ORDER — HYDROMORPHONE HCL/PF 1 MG/ML
0.5 SYRINGE (ML) INJECTION
Status: DISCONTINUED | OUTPATIENT
Start: 2025-02-05 | End: 2025-02-05 | Stop reason: HOSPADM

## 2025-02-05 RX ORDER — FENTANYL CITRATE/PF 50 MCG/ML
25 SYRINGE (ML) INJECTION
Status: DISCONTINUED | OUTPATIENT
Start: 2025-02-05 | End: 2025-02-05 | Stop reason: HOSPADM

## 2025-02-05 RX ORDER — GINSENG 100 MG
CAPSULE ORAL AS NEEDED
Status: DISCONTINUED | OUTPATIENT
Start: 2025-02-05 | End: 2025-02-05 | Stop reason: HOSPADM

## 2025-02-05 RX ORDER — LIDOCAINE HYDROCHLORIDE 20 MG/ML
INJECTION, SOLUTION EPIDURAL; INFILTRATION; INTRACAUDAL; PERINEURAL AS NEEDED
Status: DISCONTINUED | OUTPATIENT
Start: 2025-02-05 | End: 2025-02-05

## 2025-02-05 RX ORDER — DOCUSATE SODIUM 100 MG/1
100 CAPSULE, LIQUID FILLED ORAL 2 TIMES DAILY
Status: DISCONTINUED | OUTPATIENT
Start: 2025-02-05 | End: 2025-02-07 | Stop reason: HOSPADM

## 2025-02-05 RX ORDER — SUCCINYLCHOLINE/SOD CL,ISO/PF 100 MG/5ML
SYRINGE (ML) INTRAVENOUS AS NEEDED
Status: DISCONTINUED | OUTPATIENT
Start: 2025-02-05 | End: 2025-02-05

## 2025-02-05 RX ORDER — LISINOPRIL 10 MG/1
10 TABLET ORAL EVERY MORNING
Status: DISCONTINUED | OUTPATIENT
Start: 2025-02-05 | End: 2025-02-07 | Stop reason: HOSPADM

## 2025-02-05 RX ORDER — SODIUM CHLORIDE 9 MG/ML
125 INJECTION, SOLUTION INTRAVENOUS CONTINUOUS
Status: DISPENSED | OUTPATIENT
Start: 2025-02-05 | End: 2025-02-06

## 2025-02-05 RX ORDER — PRAVASTATIN SODIUM 40 MG
40 TABLET ORAL DAILY
Status: DISCONTINUED | OUTPATIENT
Start: 2025-02-05 | End: 2025-02-07 | Stop reason: HOSPADM

## 2025-02-05 RX ORDER — HEPARIN SODIUM 5000 [USP'U]/ML
5000 INJECTION, SOLUTION INTRAVENOUS; SUBCUTANEOUS EVERY 8 HOURS SCHEDULED
Status: DISCONTINUED | OUTPATIENT
Start: 2025-02-06 | End: 2025-02-07 | Stop reason: HOSPADM

## 2025-02-05 RX ORDER — SODIUM CHLORIDE, SODIUM LACTATE, POTASSIUM CHLORIDE, CALCIUM CHLORIDE 600; 310; 30; 20 MG/100ML; MG/100ML; MG/100ML; MG/100ML
50 INJECTION, SOLUTION INTRAVENOUS CONTINUOUS
Status: CANCELLED | OUTPATIENT
Start: 2025-02-05

## 2025-02-05 RX ORDER — FENTANYL CITRATE 50 UG/ML
INJECTION, SOLUTION INTRAMUSCULAR; INTRAVENOUS AS NEEDED
Status: DISCONTINUED | OUTPATIENT
Start: 2025-02-05 | End: 2025-02-05

## 2025-02-05 RX ORDER — OXYCODONE HYDROCHLORIDE 5 MG/1
5 TABLET ORAL EVERY 4 HOURS PRN
Refills: 0 | Status: DISCONTINUED | OUTPATIENT
Start: 2025-02-05 | End: 2025-02-07 | Stop reason: HOSPADM

## 2025-02-05 RX ORDER — OXYCODONE HYDROCHLORIDE 10 MG/1
10 TABLET ORAL EVERY 4 HOURS PRN
Refills: 0 | Status: DISCONTINUED | OUTPATIENT
Start: 2025-02-05 | End: 2025-02-07 | Stop reason: HOSPADM

## 2025-02-05 RX ORDER — SODIUM CHLORIDE, SODIUM LACTATE, POTASSIUM CHLORIDE, CALCIUM CHLORIDE 600; 310; 30; 20 MG/100ML; MG/100ML; MG/100ML; MG/100ML
INJECTION, SOLUTION INTRAVENOUS CONTINUOUS PRN
Status: DISCONTINUED | OUTPATIENT
Start: 2025-02-05 | End: 2025-02-05

## 2025-02-05 RX ORDER — VENLAFAXINE HYDROCHLORIDE 37.5 MG/1
37.5 CAPSULE, EXTENDED RELEASE ORAL
Status: DISCONTINUED | OUTPATIENT
Start: 2025-02-05 | End: 2025-02-07 | Stop reason: HOSPADM

## 2025-02-05 RX ORDER — ROCURONIUM BROMIDE 10 MG/ML
INJECTION, SOLUTION INTRAVENOUS AS NEEDED
Status: DISCONTINUED | OUTPATIENT
Start: 2025-02-05 | End: 2025-02-05

## 2025-02-05 RX ORDER — CHLORHEXIDINE GLUCONATE ORAL RINSE 1.2 MG/ML
15 SOLUTION DENTAL ONCE
Status: COMPLETED | OUTPATIENT
Start: 2025-02-05 | End: 2025-02-05

## 2025-02-05 RX ORDER — EPHEDRINE SULFATE 50 MG/ML
INJECTION INTRAVENOUS AS NEEDED
Status: DISCONTINUED | OUTPATIENT
Start: 2025-02-05 | End: 2025-02-05

## 2025-02-05 RX ORDER — KETAMINE HCL IN NACL, ISO-OSM 100MG/10ML
SYRINGE (ML) INJECTION AS NEEDED
Status: DISCONTINUED | OUTPATIENT
Start: 2025-02-05 | End: 2025-02-05

## 2025-02-05 RX ORDER — ONDANSETRON 2 MG/ML
4 INJECTION INTRAMUSCULAR; INTRAVENOUS ONCE AS NEEDED
Status: DISCONTINUED | OUTPATIENT
Start: 2025-02-05 | End: 2025-02-05 | Stop reason: HOSPADM

## 2025-02-05 RX ORDER — PROPOFOL 10 MG/ML
INJECTION, EMULSION INTRAVENOUS AS NEEDED
Status: DISCONTINUED | OUTPATIENT
Start: 2025-02-05 | End: 2025-02-05

## 2025-02-05 RX ORDER — CEFAZOLIN SODIUM 2 G/50ML
2000 SOLUTION INTRAVENOUS ONCE
Status: COMPLETED | OUTPATIENT
Start: 2025-02-05 | End: 2025-02-05

## 2025-02-05 RX ORDER — ACETAMINOPHEN 325 MG/1
975 TABLET ORAL EVERY 8 HOURS SCHEDULED
Status: DISCONTINUED | OUTPATIENT
Start: 2025-02-05 | End: 2025-02-05

## 2025-02-05 RX ORDER — ONDANSETRON 2 MG/ML
INJECTION INTRAMUSCULAR; INTRAVENOUS AS NEEDED
Status: DISCONTINUED | OUTPATIENT
Start: 2025-02-05 | End: 2025-02-05

## 2025-02-05 RX ORDER — ACETAMINOPHEN 325 MG/1
975 TABLET ORAL EVERY 8 HOURS SCHEDULED
Status: DISCONTINUED | OUTPATIENT
Start: 2025-02-05 | End: 2025-02-07 | Stop reason: HOSPADM

## 2025-02-05 RX ORDER — ONDANSETRON 2 MG/ML
4 INJECTION INTRAMUSCULAR; INTRAVENOUS EVERY 6 HOURS PRN
Status: DISCONTINUED | OUTPATIENT
Start: 2025-02-05 | End: 2025-02-07 | Stop reason: HOSPADM

## 2025-02-05 RX ORDER — CEFAZOLIN SODIUM 1 G/50ML
1000 SOLUTION INTRAVENOUS EVERY 8 HOURS
Status: COMPLETED | OUTPATIENT
Start: 2025-02-05 | End: 2025-02-06

## 2025-02-05 RX ORDER — LIDOCAINE 50 MG/G
1 PATCH TOPICAL DAILY PRN
Status: DISCONTINUED | OUTPATIENT
Start: 2025-02-05 | End: 2025-02-07 | Stop reason: HOSPADM

## 2025-02-05 RX ORDER — PROPOFOL 10 MG/ML
INJECTION, EMULSION INTRAVENOUS CONTINUOUS PRN
Status: DISCONTINUED | OUTPATIENT
Start: 2025-02-05 | End: 2025-02-05

## 2025-02-05 RX ORDER — METHOCARBAMOL 500 MG/1
750 TABLET, FILM COATED ORAL EVERY 6 HOURS SCHEDULED
Status: DISCONTINUED | OUTPATIENT
Start: 2025-02-05 | End: 2025-02-05

## 2025-02-05 RX ORDER — SODIUM CHLORIDE 9 MG/ML
125 INJECTION, SOLUTION INTRAVENOUS CONTINUOUS
Status: DISCONTINUED | OUTPATIENT
Start: 2025-02-05 | End: 2025-02-05

## 2025-02-05 RX ORDER — MIDAZOLAM HYDROCHLORIDE 2 MG/2ML
INJECTION, SOLUTION INTRAMUSCULAR; INTRAVENOUS AS NEEDED
Status: DISCONTINUED | OUTPATIENT
Start: 2025-02-05 | End: 2025-02-05

## 2025-02-05 RX ORDER — DEXAMETHASONE SODIUM PHOSPHATE 10 MG/ML
INJECTION, SOLUTION INTRAMUSCULAR; INTRAVENOUS AS NEEDED
Status: DISCONTINUED | OUTPATIENT
Start: 2025-02-05 | End: 2025-02-05

## 2025-02-05 RX ORDER — LIDOCAINE HYDROCHLORIDE AND EPINEPHRINE 10; 10 MG/ML; UG/ML
INJECTION, SOLUTION INFILTRATION; PERINEURAL AS NEEDED
Status: DISCONTINUED | OUTPATIENT
Start: 2025-02-05 | End: 2025-02-05 | Stop reason: HOSPADM

## 2025-02-05 RX ORDER — SODIUM CHLORIDE 9 MG/ML
INJECTION, SOLUTION INTRAVENOUS CONTINUOUS PRN
Status: DISCONTINUED | OUTPATIENT
Start: 2025-02-05 | End: 2025-02-05

## 2025-02-05 RX ADMIN — LISINOPRIL 10 MG: 10 TABLET ORAL at 17:02

## 2025-02-05 RX ADMIN — PRAVASTATIN SODIUM 40 MG: 40 TABLET ORAL at 17:02

## 2025-02-05 RX ADMIN — METHOCARBAMOL 750 MG: 500 TABLET ORAL at 21:29

## 2025-02-05 RX ADMIN — ACETAMINOPHEN 975 MG: 325 TABLET, FILM COATED ORAL at 17:02

## 2025-02-05 RX ADMIN — LIDOCAINE HYDROCHLORIDE 100 MG: 20 INJECTION, SOLUTION EPIDURAL; INFILTRATION; INTRACAUDAL; PERINEURAL at 10:26

## 2025-02-05 RX ADMIN — FENTANYL CITRATE 50 MCG: 50 INJECTION, SOLUTION INTRAMUSCULAR; INTRAVENOUS at 10:29

## 2025-02-05 RX ADMIN — SODIUM CHLORIDE, SODIUM LACTATE, POTASSIUM CHLORIDE, AND CALCIUM CHLORIDE: .6; .31; .03; .02 INJECTION, SOLUTION INTRAVENOUS at 09:30

## 2025-02-05 RX ADMIN — PHENYLEPHRINE HYDROCHLORIDE 30 MCG/MIN: 10 INJECTION, SOLUTION INTRAVENOUS at 10:45

## 2025-02-05 RX ADMIN — SODIUM CHLORIDE 125 ML/HR: 0.9 INJECTION, SOLUTION INTRAVENOUS at 09:42

## 2025-02-05 RX ADMIN — METHOCARBAMOL 750 MG: 500 TABLET ORAL at 17:02

## 2025-02-05 RX ADMIN — HYDROMORPHONE HYDROCHLORIDE 0.5 MG: 1 INJECTION, SOLUTION INTRAMUSCULAR; INTRAVENOUS; SUBCUTANEOUS at 15:18

## 2025-02-05 RX ADMIN — VENLAFAXINE HYDROCHLORIDE 37.5 MG: 37.5 CAPSULE, EXTENDED RELEASE ORAL at 21:29

## 2025-02-05 RX ADMIN — CEFAZOLIN SODIUM 1000 MG: 1 SOLUTION INTRAVENOUS at 18:25

## 2025-02-05 RX ADMIN — REMIFENTANIL HYDROCHLORIDE 0.12 MCG/KG/MIN: 1 INJECTION, POWDER, LYOPHILIZED, FOR SOLUTION INTRAVENOUS at 10:28

## 2025-02-05 RX ADMIN — ONDANSETRON 4 MG: 2 INJECTION INTRAMUSCULAR; INTRAVENOUS at 14:24

## 2025-02-05 RX ADMIN — DEXAMETHASONE SODIUM PHOSPHATE 10 MG: 10 INJECTION, SOLUTION INTRAMUSCULAR; INTRAVENOUS at 10:44

## 2025-02-05 RX ADMIN — SODIUM CHLORIDE: 0.9 INJECTION, SOLUTION INTRAVENOUS at 10:29

## 2025-02-05 RX ADMIN — SODIUM CHLORIDE: 0.9 INJECTION, SOLUTION INTRAVENOUS at 13:02

## 2025-02-05 RX ADMIN — CEFAZOLIN SODIUM 2000 MG: 2 SOLUTION INTRAVENOUS at 11:00

## 2025-02-05 RX ADMIN — Medication 100 MG: at 10:26

## 2025-02-05 RX ADMIN — DOCUSATE SODIUM 100 MG: 100 CAPSULE, LIQUID FILLED ORAL at 17:03

## 2025-02-05 RX ADMIN — EPHEDRINE SULFATE 10 MG: 50 INJECTION INTRAVENOUS at 10:45

## 2025-02-05 RX ADMIN — PROPOFOL 120 MCG/KG/MIN: 10 INJECTION, EMULSION INTRAVENOUS at 10:28

## 2025-02-05 RX ADMIN — EPHEDRINE SULFATE 15 MG: 50 INJECTION INTRAVENOUS at 10:49

## 2025-02-05 RX ADMIN — CHLORHEXIDINE GLUCONATE 15 ML: 1.2 SOLUTION ORAL at 09:25

## 2025-02-05 RX ADMIN — PROPOFOL 200 MG: 10 INJECTION, EMULSION INTRAVENOUS at 10:26

## 2025-02-05 RX ADMIN — FENTANYL CITRATE 50 MCG: 50 INJECTION, SOLUTION INTRAMUSCULAR; INTRAVENOUS at 10:35

## 2025-02-05 RX ADMIN — SUGAMMADEX 200 MG: 100 INJECTION, SOLUTION INTRAVENOUS at 12:01

## 2025-02-05 RX ADMIN — SODIUM CHLORIDE 125 ML/HR: 0.9 INJECTION, SOLUTION INTRAVENOUS at 16:51

## 2025-02-05 RX ADMIN — Medication 20 MG: at 12:01

## 2025-02-05 RX ADMIN — ROCURONIUM BROMIDE 30 MG: 10 INJECTION, SOLUTION INTRAVENOUS at 11:21

## 2025-02-05 RX ADMIN — SODIUM CHLORIDE, SODIUM LACTATE, POTASSIUM CHLORIDE, AND CALCIUM CHLORIDE: .6; .31; .03; .02 INJECTION, SOLUTION INTRAVENOUS at 13:02

## 2025-02-05 RX ADMIN — MIDAZOLAM 1 MG: 1 INJECTION INTRAMUSCULAR; INTRAVENOUS at 10:25

## 2025-02-05 RX ADMIN — Medication 30 MG: at 10:39

## 2025-02-05 RX ADMIN — MIDAZOLAM 1 MG: 1 INJECTION INTRAMUSCULAR; INTRAVENOUS at 10:20

## 2025-02-05 NOTE — PLAN OF CARE
Problem: PAIN - ADULT  Goal: Verbalizes/displays adequate comfort level or baseline comfort level  Description: Interventions:  - Encourage patient to monitor pain and request assistance  - Assess pain using appropriate pain scale  - Administer analgesics based on type and severity of pain and evaluate response  - Implement non-pharmacological measures as appropriate and evaluate response  - Consider cultural and social influences on pain and pain management  - Notify physician/advanced practitioner if interventions unsuccessful or patient reports new pain  Outcome: Progressing     Problem: INFECTION - ADULT  Goal: Absence or prevention of progression during hospitalization  Description: INTERVENTIONS:  - Assess and monitor for signs and symptoms of infection  - Monitor lab/diagnostic results  - Monitor all insertion sites, i.e. indwelling lines, tubes, and drains  - Monitor endotracheal if appropriate and nasal secretions for changes in amount and color  - Indian appropriate cooling/warming therapies per order  - Administer medications as ordered  - Instruct and encourage patient and family to use good hand hygiene technique  - Identify and instruct in appropriate isolation precautions for identified infection/condition  Outcome: Progressing  Goal: Absence of fever/infection during neutropenic period  Description: INTERVENTIONS:  - Monitor WBC    Outcome: Progressing     Problem: SAFETY ADULT  Goal: Patient will remain free of falls  Description: INTERVENTIONS:  - Educate patient/family on patient safety including physical limitations  - Instruct patient to call for assistance with activity   - Consult OT/PT to assist with strengthening/mobility   - Keep Call bell within reach  - Keep bed low and locked with side rails adjusted as appropriate  - Keep care items and personal belongings within reach  - Initiate and maintain comfort rounds  - Make Fall Risk Sign visible to staff  - Offer Toileting every 2 Hours,  in advance of need  - Initiate/Maintain alarm  - Obtain necessary fall risk management equipment  - Apply yellow socks and bracelet for high fall risk patients  - Consider moving patient to room near nurses station  Outcome: Progressing  Goal: Maintain or return to baseline ADL function  Description: INTERVENTIONS:  -  Assess patient's ability to carry out ADLs; assess patient's baseline for ADL function and identify physical deficits which impact ability to perform ADLs (bathing, care of mouth/teeth, toileting, grooming, dressing, etc.)  - Assess/evaluate cause of self-care deficits   - Assess range of motion  - Assess patient's mobility; develop plan if impaired  - Assess patient's need for assistive devices and provide as appropriate  - Encourage maximum independence but intervene and supervise when necessary  - Involve family in performance of ADLs  - Assess for home care needs following discharge   - Consider OT consult to assist with ADL evaluation and planning for discharge  - Provide patient education as appropriate  Outcome: Progressing  Goal: Maintains/Returns to pre admission functional level  Description: INTERVENTIONS:  - Perform AM-PAC 6 Click Basic Mobility/ Daily Activity assessment daily.  - Set and communicate daily mobility goal to care team and patient/family/caregiver.   - Collaborate with rehabilitation services on mobility goals if consulted  - Perform Range of Motion 3 times a day.  - Reposition patient every 3 hours.  - Dangle patient 3 times a day  - Stand patient 3 times a day  - Ambulate patient 3 times a day  - Out of bed to chair 3 times a day   - Out of bed for meals 3 times a day  - Out of bed for toileting  - Record patient progress and toleration of activity level   Outcome: Progressing     Problem: DISCHARGE PLANNING  Goal: Discharge to home or other facility with appropriate resources  Description: INTERVENTIONS:  - Identify barriers to discharge w/patient and caregiver  - Arrange  for needed discharge resources and transportation as appropriate  - Identify discharge learning needs (meds, wound care, etc.)  - Arrange for interpretive services to assist at discharge as needed  - Refer to Case Management Department for coordinating discharge planning if the patient needs post-hospital services based on physician/advanced practitioner order or complex needs related to functional status, cognitive ability, or social support system  Outcome: Progressing     Problem: Knowledge Deficit  Goal: Patient/family/caregiver demonstrates understanding of disease process, treatment plan, medications, and discharge instructions  Description: Complete learning assessment and assess knowledge base.  Interventions:  - Provide teaching at level of understanding  - Provide teaching via preferred learning methods  Outcome: Progressing

## 2025-02-05 NOTE — ASSESSMENT & PLAN NOTE
POD#0 s/p L5-S1 TLIF    Patient is feeling well.  C/o only some discomfort at incision site.  Right leg pain significantly improved. No wekaness.  Denies any current numbness or tingling.    No nausea.  Osei clears.  Waiting for dinner tray    Springer in place, UO 1350 ml  Drain with bloody output, 100 ml    Vitals:    02/05/25 1702   BP:    Pulse:    Resp:    Temp:    SpO2: 93%     Physical Exam:   General appearance: alert and oriented, in no acute distress  Head: Normocephalic, without obvious abnormality, atraumatic, sclerae anicteric, mucous membranes moist  Neck: no JVD and supple, symmetrical, trachea midline  Lungs: clear to auscultation, no wheezes or rales  Heart:   Regular rate and rhythm, S1-S2 normal, no murmur  Abdomen:   Flat, soft, nontender, active bowel sounds  Back: Incision site with clean dressing in place.  No surrounding swelling or bruising. Appropriate tenderness.  Drain with bloody output.   Extremities:   Good ROM, strength intact, mild weakness 4+/5 dorsiflexion left foot, sensation to light touch intact LEs  Skin: Warm, dry; incision as above  Nursing notes and vital signs reviewed    PLAN:  Diet as tolerated  Pain control as needed  Continue Ancef, change to Keflex POD#1  D/c springer in AM  Monitor drain  AM labs and xrays  OOB, ambulate with assistance  PT/OT  DVT PPx

## 2025-02-05 NOTE — ANESTHESIA PREPROCEDURE EVALUATION
Procedure:  L5-S1 transforaminal lumbar interbody fusion, possible additional levels, with neuromonitoring and neuronavigation (Spine Lumbar)    Relevant Problems   ANESTHESIA (within normal limits)      CARDIO   (+) Hypercholesteremia   (+) Hypertension      /RENAL   (+) Kidney disease      MUSCULOSKELETAL   (+) Other spondylosis with radiculopathy, lumbar region      NEURO/PSYCH   (+) Anxiety      PULMONARY   (+) Sleep apnea   (-) Smoking   (-) URI (upper respiratory infection)      Physical Exam    Airway    Mallampati score: III  TM Distance: >3 FB  Neck ROM: full     Dental   No notable dental hx     Cardiovascular      Pulmonary      Other Findings      Lab Results   Component Value Date    WBC 7.32 01/15/2025    HGB 15.6 01/15/2025     01/15/2025     Lab Results   Component Value Date    SODIUM 137 01/15/2025    K 4.3 01/15/2025    BUN 24 01/15/2025    CREATININE 1.42 (H) 01/15/2025    EGFR 51 01/15/2025     Lab Results   Component Value Date    PTT 34 01/15/2025      Lab Results   Component Value Date    INR 1.01 01/15/2025       Lab Results   Component Value Date    HGBA1C 5.8 (H) 01/15/2025         Anesthesia Plan  ASA Score- 2     Anesthesia Type- general with ASA Monitors.         Additional Monitors:     Airway Plan: ETT.           Plan Factors-Exercise tolerance (METS): >4 METS.    Chart reviewed.   Existing labs reviewed. Patient summary reviewed.    Patient is not a current smoker.      Obstructive sleep apnea risk education given perioperatively.        Induction- intravenous.    Postoperative Plan-         Informed Consent- Anesthetic plan and risks discussed with patient.  I personally reviewed this patient with the CRNA. Discussed and agreed on the Anesthesia Plan with the CRNA..

## 2025-02-05 NOTE — ANESTHESIA POSTPROCEDURE EVALUATION
Post-Op Assessment Note    CV Status:  Stable  Pain Score: 0    Pain management: adequate       Mental Status:  Alert and awake   Hydration Status:  Euvolemic   PONV Controlled:  Controlled   Airway Patency:  Patent     Post Op Vitals Reviewed: Yes    No anethesia notable event occurred.    Staff: Anesthesiologist, CRNA           Last Filed PACU Vitals:  Vitals Value Taken Time   Temp 97.8    Pulse 80    /90    Resp 18    SpO2 98

## 2025-02-05 NOTE — PROGRESS NOTES
Post-op Note - Neurosurgery  Name: Jordi Dias 64 y.o. male I MRN: [id number]  Unit/Bed#: -01 I Date of Admission: 2/5/2025   Date of Service: 2/5/2025 I Hospital Day: 0    Assessment & Plan  Other spondylosis with radiculopathy, lumbar region    S/P lumbar fusion  POD#0 s/p L5-S1 TLIF    Patient is feeling well.  C/o only some discomfort at incision site.  Right leg pain significantly improved. No wekaness.  Denies any current numbness or tingling.    No nausea.  Osei clears.  Waiting for dinner tray    Springer in place, UO 1350 ml  Drain with bloody output, 100 ml    Vitals:    02/05/25 1702   BP:    Pulse:    Resp:    Temp:    SpO2: 93%     Physical Exam:   General appearance: alert and oriented, in no acute distress  Head: Normocephalic, without obvious abnormality, atraumatic, sclerae anicteric, mucous membranes moist  Neck: no JVD and supple, symmetrical, trachea midline  Lungs: clear to auscultation, no wheezes or rales  Heart:   Regular rate and rhythm, S1-S2 normal, no murmur  Abdomen:   Flat, soft, nontender, active bowel sounds  Back: Incision site with clean dressing in place.  No surrounding swelling or bruising. Appropriate tenderness.  Drain with bloody output.   Extremities:   Good ROM, strength intact, mild weakness 4+/5 dorsiflexion left foot, sensation to light touch intact LEs  Skin: Warm, dry; incision as above  Nursing notes and vital signs reviewed    PLAN:  Diet as tolerated  Pain control as needed  Continue Ancef, change to Keflex POD#1  D/c springer in AM  Monitor drain  AM labs and xrays  OOB, ambulate with assistance  PT/OT  DVT PPx        Please contact the SecureChat role for the neurosurgery service with any questions/concerns.    Lyly Townsend

## 2025-02-05 NOTE — OP NOTE
OPERATIVE REPORT  PATIENT NAME: Jordi Dias    :  1960  MRN: [id number]  Pt Location: [facility] OR ROOM 03    SURGERY DATE: 2025    Surgeons and Role:     * Bunny Wilson MD - Primary     * Lucina Vasquez PA-C - Assisting    Preop Diagnosis:  Other spondylosis with radiculopathy, lumbar region [M47.26]  Chronic right-sided low back pain with right-sided sciatica [M54.41, G89.29]    Post-Op Diagnosis Codes:     * Other spondylosis with radiculopathy, lumbar region [M47.26]     * Chronic right-sided low back pain with right-sided sciatica [M54.41, G89.29]    Procedure(s):  1) L5-S1 Kevin-Myles Osteotomies and transforaminal interbody fusion  2) Bilateral pedicle screws placements L5 and S1  3) Arthrodesis L5-S1 with autograft and [place]  4) Use of neuromonitoring  5) Use of neuronavigation ([employer] with O-arm)  6) Use of fluoroscopy <1hr    Specimen(s):  * No specimens in log *    Estimated Blood Loss:   Minimal    Drains:  Closed/Suction Drain Inferior;Left;Medial Back Accordion 10 Fr. (Active)   Number of days: 0       NG/OG/Enteral Tube Orogastric 18 Fr Center mouth (Active)   Number of days: 0       Urethral Catheter Latex 16 Fr. (Active)   Number of days: 0       Anesthesia Type:   General    Operative Indications:  Other spondylosis with radiculopathy, lumbar region [M47.26]  Chronic right-sided low back pain with right-sided sciatica [M54.41, G89.29]    Operative Findings:  L5-S1 disc degeneration with disc space height collapse with severe right and moderate left foraminal stenosis    Complications:   None    Procedure and Technique:  After obtaining written informed consent, the patient was brought to the operating room.  General endotracheal anesthesia was induced.  Neuro monitoring was then placed as was an arterial line and Reyes catheter. The patient was then flipped prone onto gel rolls in position. Baseline monitoring was obtained.      The patient was then given Ancef 2g  as perioperative antibiotic.       The patient was prepped and draped in the usual sterile fashion.  Prior to draping, 3 spinal needles were used to localize the operative levels under fluoroscopy. Lidocaine with epinephrine was injected in the surgical site. Surgical time-out was performed.       A ten blade was then used to open a midline lumbar incision. Monopolar cautery was then used to dissect down through the dorsal lumbar fascia to the spinous process of L5 and S1. Dissection was carried in a subperiosteal manner over the lamina to the facet joints. Dissection was carried out over the facets to the transverse processes of L5 and S1. After all the transverse processes were exposed, a spinous process clamp was placed on L5 and an intraoperative O-arm spin was performed. Neuro navigation was then registered and confirmed to be accurate. Image guidance from the [employer] was used throughout the duration of the case. Images were loaded into the system and used for preoperative planning as well as intraoperative guidance. It was critically important through the duration of the case for navigation and avoidance of critical structures.      Using navigation guidance, the entry site for the L5 pedicle was identified. A high speed drill with Matchstick attachment was used to decorticate the entry site. A navigated pedicle probe was then used to create the screw path. A Ramachandran ball tip probe was used and no breech in the pedicle was noted. A screw was then placed on the left L5. Similar steps were repeated and screws were placed at the right L5 and bilateral S1.      Screws were confirmed to be in satisfactory position with fluoroscopy     2 pedicle distractors were then placed in L5 and S1 screw heads bilaterally. The spinous processes of L5 were removed with Leksell rongeurs. The matchstick was used to drill across the pars of L5. Osteotomes were used to complete the cut into the pars. Using a combination of  Kerrison rongeurs and Leksell rongeurs, francisco osteotomy was performed. The superior articular process of S1 was then removed using Kerrison rongeurs. Bilateral foramina of L5 were decompressed using Kerrison rongeurs. There was disc space height collapse resulting in severe right sided and moderate left sided L5-S1 foraminal stenosis. There was also an early right sided synovial cyst. Attention was then turned to performing the interbody fusion. The TLIF was performed from the right.     A [place] #4 was used to dissect and expose the disc space of L5-S1. A nerve root retractor was then used to retract the thecal sac. A knife was used to make an opening into the disc space. Using a combination of natasha, dilators, and pituitary rongeurs, the disc material was removed piecemeal. The Kerrison rongeurs were used to widen the disc space opening. Once adequate disc material had been removed, disc space rasps were used to scrape the endplates and any remaining disc was removed. Fluoroscopy was used to confirm the depth of disc removal. A cage was subsequently placed. Placement was confirmed with fluoroscopy and noted to be satisfactory. Prior to cage placement, a mixture of autograft and Cyn was funneled into the disc space. Additional graft material was also placed behind the cage.     Two lordotic rods were measured, bent and cut to size. The rods were placed in the screw heads and set caps were placed and final tightened.  The wound was then copiously irrigated with antibiotic irrigation.  The drill was used to decorticate the transverse processes of L5 and S1.  The mixture of autograft and Cyn was then placed in the intertransverse space.      A 10 [place] Hemovac drain was tunneled in a subfascial fashion and secured in place with a 4-0 monocryl suture.     Next the muscle and fascia were closed with 0 Vicryl. A #1 stratafix was used to further support the fascia in a running fashion. The deep dermal layer  was closed with an inverted 2 0 Vicryl.  The skin was closed with a 4-0 stratafix suture in a subcuticular fashion. 2-0 prolenes were then placed in a vertical mattress fashion. Sterile dressing was applied.       Surgical sign-out was performed.     No qualified resident was available. Lucina Vasquez PA-C was present for the procedure, and provided essential assistance with proper exposure, retraction, hemostasis, instrumentation and decompression, and wound closure, which was necessary secondary to the complex nature of this case.     I was present for the entire procedure.    Patient Disposition:  PACU              SIGNATURE: Bunny Wilson MD  DATE: February 5, 2025  TIME: 2:24 PM

## 2025-02-05 NOTE — H&P
"H&P reviewed. After examining the patient I find no changes in the patients condition since the H&P had been written.    Patient personally seen and examined.  Neurological examination unchanged compared to last office/progress note, with the following exceptions:    /74   Pulse (!) 48   Temp (!) 96.9 °F (36.1 °C) (Temporal)   Resp 16   Ht 5' 11\" (1.803 m)   Wt 96.7 kg (213 lb 3.2 oz)   SpO2 97%   BMI 29.74 kg/m²      Post operative instructions and medications have been reviewed with the patient and family.    Assessment and Plan:    All questions have been answered to the patient and family satisfaction.  Plan to proceed with L5-S1 TLIF. They are in agreement with proceeding.    "

## 2025-02-06 ENCOUNTER — APPOINTMENT (INPATIENT)
Dept: RADIOLOGY | Facility: HOSPITAL | Age: 65
DRG: 451 | End: 2025-02-06
Payer: OTHER GOVERNMENT

## 2025-02-06 PROBLEM — N18.9 CKD (CHRONIC KIDNEY DISEASE): Status: ACTIVE | Noted: 2025-02-06

## 2025-02-06 PROBLEM — E11.9 DIABETES (HCC): Status: ACTIVE | Noted: 2025-02-06

## 2025-02-06 PROBLEM — I10 HTN (HYPERTENSION): Status: ACTIVE | Noted: 2025-02-06

## 2025-02-06 LAB
ANION GAP SERPL CALCULATED.3IONS-SCNC: 5 MMOL/L (ref 4–13)
BUN SERPL-MCNC: 20 MG/DL (ref 5–25)
CALCIUM SERPL-MCNC: 9.1 MG/DL (ref 8.4–10.2)
CHLORIDE SERPL-SCNC: 106 MMOL/L (ref 96–108)
CO2 SERPL-SCNC: 24 MMOL/L (ref 21–32)
CREAT SERPL-MCNC: 1.46 MG/DL (ref 0.6–1.3)
ERYTHROCYTE [DISTWIDTH] IN BLOOD BY AUTOMATED COUNT: 11.7 % (ref 11.6–15.1)
GFR SERPL CREATININE-BSD FRML MDRD: 49 ML/MIN/1.73SQ M
GLUCOSE SERPL-MCNC: 110 MG/DL (ref 65–140)
GLUCOSE SERPL-MCNC: 170 MG/DL (ref 65–140)
GLUCOSE SERPL-MCNC: 89 MG/DL (ref 65–140)
HCT VFR BLD AUTO: 40.2 % (ref 36.5–49.3)
HGB BLD-MCNC: 13.6 G/DL (ref 12–17)
MCH RBC QN AUTO: 30.4 PG (ref 26.8–34.3)
MCHC RBC AUTO-ENTMCNC: 33.8 G/DL (ref 31.4–37.4)
MCV RBC AUTO: 90 FL (ref 82–98)
PLATELET # BLD AUTO: 194 THOUSANDS/UL (ref 149–390)
PLATELET # BLD AUTO: 199 THOUSANDS/UL (ref 149–390)
PMV BLD AUTO: 9.8 FL (ref 8.9–12.7)
PMV BLD AUTO: 9.9 FL (ref 8.9–12.7)
POTASSIUM SERPL-SCNC: 4.4 MMOL/L (ref 3.5–5.3)
RBC # BLD AUTO: 4.48 MILLION/UL (ref 3.88–5.62)
SODIUM SERPL-SCNC: 135 MMOL/L (ref 135–147)
WBC # BLD AUTO: 13 THOUSAND/UL (ref 4.31–10.16)

## 2025-02-06 PROCEDURE — 97116 GAIT TRAINING THERAPY: CPT

## 2025-02-06 PROCEDURE — 99024 POSTOP FOLLOW-UP VISIT: CPT | Performed by: STUDENT IN AN ORGANIZED HEALTH CARE EDUCATION/TRAINING PROGRAM

## 2025-02-06 PROCEDURE — 97166 OT EVAL MOD COMPLEX 45 MIN: CPT

## 2025-02-06 PROCEDURE — 85049 AUTOMATED PLATELET COUNT: CPT | Performed by: PHYSICIAN ASSISTANT

## 2025-02-06 PROCEDURE — 80048 BASIC METABOLIC PNL TOTAL CA: CPT | Performed by: PHYSICIAN ASSISTANT

## 2025-02-06 PROCEDURE — 82948 REAGENT STRIP/BLOOD GLUCOSE: CPT

## 2025-02-06 PROCEDURE — 72100 X-RAY EXAM L-S SPINE 2/3 VWS: CPT

## 2025-02-06 PROCEDURE — 85027 COMPLETE CBC AUTOMATED: CPT | Performed by: PHYSICIAN ASSISTANT

## 2025-02-06 PROCEDURE — 97162 PT EVAL MOD COMPLEX 30 MIN: CPT

## 2025-02-06 RX ORDER — HYDRALAZINE HYDROCHLORIDE 20 MG/ML
5 INJECTION INTRAMUSCULAR; INTRAVENOUS EVERY 8 HOURS PRN
Status: DISCONTINUED | OUTPATIENT
Start: 2025-02-06 | End: 2025-02-07 | Stop reason: HOSPADM

## 2025-02-06 RX ORDER — POLYETHYLENE GLYCOL 3350 17 G/17G
17 POWDER, FOR SOLUTION ORAL DAILY
Status: DISCONTINUED | OUTPATIENT
Start: 2025-02-07 | End: 2025-02-07 | Stop reason: HOSPADM

## 2025-02-06 RX ORDER — BISACODYL 5 MG/1
5 TABLET, DELAYED RELEASE ORAL DAILY PRN
Status: DISCONTINUED | OUTPATIENT
Start: 2025-02-06 | End: 2025-02-07 | Stop reason: HOSPADM

## 2025-02-06 RX ADMIN — HEPARIN SODIUM 5000 UNITS: 5000 INJECTION, SOLUTION INTRAVENOUS; SUBCUTANEOUS at 22:11

## 2025-02-06 RX ADMIN — HEPARIN SODIUM 5000 UNITS: 5000 INJECTION, SOLUTION INTRAVENOUS; SUBCUTANEOUS at 13:12

## 2025-02-06 RX ADMIN — METHOCARBAMOL 750 MG: 500 TABLET ORAL at 17:10

## 2025-02-06 RX ADMIN — DOCUSATE SODIUM 100 MG: 100 CAPSULE, LIQUID FILLED ORAL at 09:13

## 2025-02-06 RX ADMIN — METHOCARBAMOL 750 MG: 500 TABLET ORAL at 05:22

## 2025-02-06 RX ADMIN — ACETAMINOPHEN 975 MG: 325 TABLET, FILM COATED ORAL at 17:10

## 2025-02-06 RX ADMIN — DOCUSATE SODIUM 100 MG: 100 CAPSULE, LIQUID FILLED ORAL at 17:10

## 2025-02-06 RX ADMIN — ACETAMINOPHEN 975 MG: 325 TABLET, FILM COATED ORAL at 00:07

## 2025-02-06 RX ADMIN — PRAVASTATIN SODIUM 40 MG: 40 TABLET ORAL at 09:13

## 2025-02-06 RX ADMIN — SODIUM CHLORIDE 125 ML/HR: 0.9 INJECTION, SOLUTION INTRAVENOUS at 05:20

## 2025-02-06 RX ADMIN — METHOCARBAMOL 750 MG: 500 TABLET ORAL at 22:10

## 2025-02-06 RX ADMIN — BISACODYL 5 MG: 5 TABLET, COATED ORAL at 20:02

## 2025-02-06 RX ADMIN — CEFAZOLIN SODIUM 1000 MG: 1 SOLUTION INTRAVENOUS at 03:30

## 2025-02-06 RX ADMIN — CEFAZOLIN SODIUM 1000 MG: 1 SOLUTION INTRAVENOUS at 11:20

## 2025-02-06 RX ADMIN — SODIUM CHLORIDE 125 ML/HR: 0.9 INJECTION, SOLUTION INTRAVENOUS at 00:08

## 2025-02-06 RX ADMIN — METHOCARBAMOL 750 MG: 500 TABLET ORAL at 11:20

## 2025-02-06 RX ADMIN — ACETAMINOPHEN 975 MG: 325 TABLET, FILM COATED ORAL at 09:12

## 2025-02-06 RX ADMIN — CEPHALEXIN 500 MG: 250 CAPSULE ORAL at 22:11

## 2025-02-06 RX ADMIN — VENLAFAXINE HYDROCHLORIDE 37.5 MG: 37.5 CAPSULE, EXTENDED RELEASE ORAL at 22:11

## 2025-02-06 RX ADMIN — HEPARIN SODIUM 5000 UNITS: 5000 INJECTION, SOLUTION INTRAVENOUS; SUBCUTANEOUS at 05:19

## 2025-02-06 NOTE — CASE MANAGEMENT
Case Management Assessment & Discharge Planning Note    Patient name Jordi Dias  Location /-01 MRN [id number]  : 1960 Date 2025       Current Admission Date: 2025  Current Admission Diagnosis:S/P lumbar fusion   Patient Active Problem List    Diagnosis Date Noted Date Diagnosed    CKD (chronic kidney disease) 2025     Pre Diabetes 2025     HTN (hypertension) 2025     S/P lumbar fusion 2025     CPAP (continuous positive airway pressure) dependence      Hypertension      Sleep apnea      Anxiety      Hypercholesteremia      Kidney disease      Other spondylosis with radiculopathy, lumbar region 2023     Lumbar radiculopathy 2023       LOS (days): 1  Geometric Mean LOS (GMLOS) (days): 3.2  Days to GMLOS:2.1     OBJECTIVE:    Risk of Unplanned Readmission Score: 9.27         Current admission status: Inpatient       Preferred Pharmacy:   [facility]/pharmacy #[id number] - [address] [address]  Phone: [phone] Fax: [phone]    Primary Care Provider: Linnea Godwin MD    Primary Insurance: [employer]  Secondary Insurance:     ASSESSMENT:  Active Health Care Proxies       Anson Dias Health Care Representative - Spouse   Primary Phone: [phone] (Mobile)                 Advance Directives  Does patient have a Health Care POA?: No  Was patient offered paperwork?: Yes (declined)  Does patient currently have a Health Care decision maker?: Yes, please see Health Care Proxy section  Does patient have Advance Directives?: No  Was patient offered paperwork?: Yes (declined)  Primary Contact: Anson (wife)    Readmission Root Cause  30 Day Readmission: No    Patient Information  Admitted from:: Home  Mental Status: Alert  During Assessment patient was accompanied by: Not accompanied during assessment  Assessment information provided by:: Patient  Primary Caregiver: Self  Support Systems: Self,  Spouse/significant other  Home entry access options. Select all that apply.: No steps to enter home  Type of Current Residence: Bi-level  Upon entering residence, is there a bedroom on the main floor (no further steps)?: Yes  Upon entering residence, is there a bathroom on the main floor (no further steps)?: Yes  Living Arrangements: Lives w/ Spouse/significant other  Is patient a ?: Yes  Is [facility] with [facility]: Yes  Is patient service connected?: Yes    Activities of Daily Living Prior to Admission  Functional Status: Independent  Completes ADLs independently?: Yes  Ambulates independently?: Yes  Does patient use assisted devices?: No  Does patient currently own DME?: No  Does patient have a history of Outpatient Therapy (PT/OT)?: No  Does the patient have a history of Short-Term Rehab?: No  Does patient have a history of HHC?: No  Does patient currently have HHC?: No    Patient Information Continued  Does patient have prescription coverage?: Yes  Does patient receive dialysis treatments?: No  Does patient have a history of substance abuse?: No  Does patient have a history of Mental Health Diagnosis?: No    Means of Transportation  Means of Transport to Appts:: Drives Self    DISCHARGE DETAILS:    Discharge planning discussed with:: patient  Freedom of Choice: Yes  Comments - Freedom of Choice: no anticipated dc needs  CM contacted family/caregiver?: No- see comments (reports being in contact with family)  Were Treatment Team discharge recommendations reviewed with patient/caregiver?: Yes  Did patient/caregiver verbalize understanding of patient care needs?: Yes  Were patient/caregiver advised of the risks associated with not following Treatment Team discharge recommendations?: Yes    Requested Home Health Care         Is the patient interested in HHC at discharge?: No    DME Referral Provided  Referral made for DME?: No    Treatment Team Recommendation: Home  Discharge Destination  Plan:: Home  Transport at Discharge : Family     Additional Comments: Met with pt to discuss the role of CM and to discuss any help pt may  need prior to dc. Pt lives with his wife Anson in a bi-level home with no RAYSA. Pt performed ADL's indptly pta, no use of DME. No hx of HHC or rehab. No hx of mental health or D&A treatment. Pt's preferred [facility] in [place]. Pt drives. Pt's wife will transport home at dc.

## 2025-02-06 NOTE — PLAN OF CARE
Problem: PAIN - ADULT  Goal: Verbalizes/displays adequate comfort level or baseline comfort level  Description: Interventions:  - Encourage patient to monitor pain and request assistance  - Assess pain using appropriate pain scale  - Administer analgesics based on type and severity of pain and evaluate response  - Implement non-pharmacological measures as appropriate and evaluate response  - Consider cultural and social influences on pain and pain management  - Notify physician/advanced practitioner if interventions unsuccessful or patient reports new pain  Outcome: Progressing     Problem: INFECTION - ADULT  Goal: Absence or prevention of progression during hospitalization  Description: INTERVENTIONS:  - Assess and monitor for signs and symptoms of infection  - Monitor lab/diagnostic results  - Monitor all insertion sites, i.e. indwelling lines, tubes, and drains  - Monitor endotracheal if appropriate and nasal secretions for changes in amount and color  - [place] appropriate cooling/warming therapies per order  - Administer medications as ordered  - Instruct and encourage patient and family to use good hand hygiene technique  - Identify and instruct in appropriate isolation precautions for identified infection/condition  Outcome: Progressing     Problem: INFECTION - ADULT  Goal: Absence of fever/infection during neutropenic period  Description: INTERVENTIONS:  - Monitor WBC    Outcome: Progressing     Problem: SAFETY ADULT  Goal: Maintain or return to baseline ADL function  Description: INTERVENTIONS:  -  Assess patient's ability to carry out ADLs; assess patient's baseline for ADL function and identify physical deficits which impact ability to perform ADLs (bathing, care of mouth/teeth, toileting, grooming, dressing, etc.)  - Assess/evaluate cause of self-care deficits   - Assess range of motion  - Assess patient's mobility; develop plan if impaired  - Assess patient's need for assistive devices and provide  as appropriate  - Encourage maximum independence but intervene and supervise when necessary  - Involve family in performance of ADLs  - Assess for home care needs following discharge   - Consider OT consult to assist with ADL evaluation and planning for discharge  - Provide patient education as appropriate  Outcome: Progressing

## 2025-02-06 NOTE — OCCUPATIONAL THERAPY NOTE
Occupational Therapy Evaluation     Patient Name: Jordi Dias  Today's Date: 2/6/2025  Problem List  Principal Problem:    S/P lumbar fusion  Active Problems:    Other spondylosis with radiculopathy, lumbar region    CKD (chronic kidney disease)    Pre Diabetes    HTN (hypertension)    Past Medical History  Past Medical History:   Diagnosis Date    Anxiety     Panic attacks    Arthritis     Chronic pain disorder     Colon polyp     CPAP (continuous positive airway pressure) dependence     Hypercholesteremia     Hypertension     Kidney disease     Sleep apnea      Past Surgical History  Past Surgical History:   Procedure Laterality Date    COLONOSCOPY      EAR SURGERY Left     perforated ear drum    ELBOW SURGERY Left     ligament repair    EPIDURAL BLOCK INJECTION N/A 07/27/2023    Procedure: L5-S1 ILESI;  Surgeon: Melquiades Loving MD;  [facility];  Service: Pain Management     EPIDURAL BLOCK INJECTION Left 08/22/2023    Procedure: LT L5 & S1 TFESI;  Surgeon: Melquiades Loving MD;  [facility];  Service: Pain Management     EPIDURAL BLOCK INJECTION Left 11/07/2023    Procedure: LT L5 & S1 TFESI;  Surgeon: Melquiades Loving MD;  [facility];  Service: Pain Management     IR SPINE AND PAIN PROCEDURE  03/12/2024    IR SPINE AND PAIN PROCEDURE  03/27/2024    IR SPINE AND PAIN PROCEDURE  7/9/2024    IR SPINE AND PAIN PROCEDURE  9/4/2024    NERVE BLOCK Bilateral 12/05/2023    Procedure: BLOCK MEDIAL BRANCH L3, L4, L5 #1;  Surgeon: Melquiades Loving MD;  [facility];  Service: Pain Management     NERVE BLOCK Left 12/28/2023    Procedure: left L5 and S1 transforaminal LUIS;  Surgeon: Melquiades Loving MD;  [facility];  Service: Pain Management     NERVE BLOCK Bilateral 02/21/2024    Procedure: B/L L3-L5 MBB #2;  Surgeon: Melquiades Loving MD;  [facility];  Service: Pain Management     POSTERIOR LAMINECTOMY THORACIC AND LUMBAR SPINE Left 5/28/2024    Procedure: LEFT L5-S1 FAR  "LATERAL MICRODISCECTOMY;  Surgeon: James Hussein MD;  [facility];  Service: Neurosurgery    LA ARTHRODESIS COMBINED TQ 1NTRSPC LUMBAR N/A 2/5/2025    Procedure: L5-S1 transforaminal lumbar interbody fusion, possible additional levels, with neuromonitoring and neuronavigation;  Surgeon: Bunny Wilson MD;  [facility];  Service: Neurosurgery    REPLACEMENT TOTAL KNEE Bilateral          02/06/25 1404   OT Last Visit   OT Visit Date 02/06/25   Note Type   Note type Evaluation   Pain Assessment   Pain Assessment Tool 0-10   Pain Score 4   Pain Location/Orientation Location: Back   Patient's Stated Pain Goal No pain   Hospital Pain Intervention(s) Repositioned;Ambulation/increased activity   Restrictions/Precautions   Weight Bearing Precautions Per Order No   Other Precautions Chair Alarm;Bed Alarm;Spinal precautions;Pain;Fall Risk   Home Living   Type of Home House   Home Layout Two level  (bi-level; 7 steps to second floor)   Bathroom Shower/Tub Walk-in shower   Bathroom Toilet Standard   Bathroom Equipment Shower chair   Bathroom Accessibility Accessible   Home Equipment   (pt reports no DME)   Prior Function   Level of [place] Independent with ADLs;Independent with functional mobility;Independent with IADLS   Lives With Spouse;Son   Receives Help From Family   IADLs Independent with meal prep;Independent with driving;Independent with medication management   Falls in the last 6 months 0   Vocational Retired   Lifestyle   Autonomy Pt states PTA he was IND with ADL/IADLs and IND with functional mobility without AD   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   Intrinsic Gratification going to the [place]   General   Family/Caregiver Present Yes   Additional General Comments son and spouse   Subjective   Subjective \"I am feeling good.\"   ADL   Eating Assistance 7  Independent   Grooming Assistance 7  Independent   UB Bathing Assistance 5  " Supervision/Setup   LB Bathing Assistance 5  Supervision/Setup   UB Dressing Assistance 5  Supervision/Setup   LB Dressing Assistance 5  Supervision/Setup   Toileting Assistance  5  Supervision/Setup   Additional Comments supervision 2* to spinal precautions   Bed Mobility   Additional Comments Pt OOB in recliner chair upon arrival - reviewed spinal precautions seated in recliner chair   Transfers   Sit to Stand 6  Modified independent   Stand to Sit 6  Modified independent   Functional Mobility   Functional Mobility 6  Modified independent   Additional items   (no AD; ambulated the unit throughout the hallway - Pt initally supervision progressing to Trina)   Balance   Static Sitting Good   Dynamic Sitting Fair +   Static Standing Fair   Dynamic Standing Fair   Activity Tolerance   Activity Tolerance Patient tolerated treatment well   Medical Staff Made Aware PT Fatmata   Nurse Made Aware RN samreen   RUMOY Assessment   RUE Assessment WFL   LUE Assessment   LUE Assessment WFL   Hand Function   Gross Motor Coordination Functional   Fine Motor Coordination Functional   Vision-Basic Assessment   Current Vision No visual deficits   Psychosocial   Psychosocial (WDL) WDL   Cognition   Overall Cognitive Status WFL   Arousal/Participation Alert;Cooperative   Attention Within functional limits   Orientation Level Oriented X4   Memory Within functional limits   Following Commands Follows multistep commands without difficulty   Comments Pt willing to participate in OT evaluation and very plesant   Assessment   Prognosis Good   Assessment Pt is a 64 y.o. male seen for OT [facility], admitted 2/5/2025 w/ S/P lumbar fusion. Extensive chart review completed by OT. Pt is POD1 s/p L5-S1 TLIF instrumentation and PSO; pt with spinal precautions. Comorbidities affecting the pt's functional performance include a significant PMH of: CKD, HTN, lumbar radiculopathy, hypertension, anxiety, hypercholesteremia, CPAP. Personal factors affecting  pt at time of IE include: steps to enter environment and environment. PTA pt was living in a bi-level w/ 0 RAYSA and 7 steps to 2nd level, was  IND w/ ADLs and IND w/ IADLS, and IND with functional mobility with use of no DME/AD, (+) driving. Upon OT IE pt demonstrated Trina for functional transfers, Trina for functional mobility, supervision for UB ADLs and supervision for LB ADLS 2* to spinal precautions and increased pain. Pt appears to be performing close to his functional baseline at this time. Pt reports no further ADL concerns at this time. Educated patient on spinal precautions and importance of maintaining them. Based on OT IE, no further IP OT needs are indicated at this time. This OT recommends pt to continue to be OOB for meals, ambulation to/from BR, perform self care tasks, and mobility in hallway with nursing. D/C from OT caseload with above recommendations.The patient's raw score on the AM-PAC Daily Activity inpatient short form is 21 , standardized score is 44.27 , greater than 39.4. Patients at this level are likely to benefit from DC to home. However, please refer to therapist recommendation for discharge planning given other factors that may influence destination. At this time, OT recommendations at time of discharge are DC with No rehabilitation needs resources.   Goals   Patient Goals To go home   Plan   OT Frequency Eval only   Discharge Recommendation   Rehab Resource Intensity Level, OT No post-acute rehabilitation needs   AM-PAC Daily Activity Inpatient   Lower Body Dressing 3   Bathing 3   Toileting 3   Upper Body Dressing 4   Grooming 4   Eating 4   Daily Activity Raw Score 21   Daily Activity Standardized Score (Calc for Raw Score >=11) 44.27   AM-PAC Applied Cognition Inpatient   Following a Speech/Presentation 4   Understanding Ordinary Conversation 4   Taking Medications 4   Remembering Where Things Are Placed or Put Away 4   Remembering List of 4-5 Errands 4   Taking Care of Complicated  Tasks 4   Applied Cognition Raw Score 24   Applied Cognition Standardized Score 62.21   End of Consult   Education Provided Yes;Family or social support of family present for education by provider   Patient Position at End of Consult Bedside chair;Bed/Chair alarm activated;All needs within reach   Nurse Communication Nurse aware of consult       Emma Bautista ([place]), OTR/L

## 2025-02-06 NOTE — PHYSICAL THERAPY NOTE
PHYSICAL THERAPY EVAL/TX  Physical Therapy Evaluation    Performed at least 2 patient identifiers during session:  Patient Active Problem List   Diagnosis    Other spondylosis with radiculopathy, lumbar region    Lumbar radiculopathy    CPAP (continuous positive airway pressure) dependence    Hypertension    Sleep apnea    Anxiety    Hypercholesteremia    Kidney disease    S/P lumbar fusion    CKD (chronic kidney disease)    Pre Diabetes    HTN (hypertension)       Past Medical History:   Diagnosis Date    Anxiety     Panic attacks    Arthritis     Chronic pain disorder     Colon polyp     CPAP (continuous positive airway pressure) dependence     Hypercholesteremia     Hypertension     Kidney disease     Sleep apnea        Past Surgical History:   Procedure Laterality Date    COLONOSCOPY      EAR SURGERY Left     perforated ear drum    ELBOW SURGERY Left     ligament repair    EPIDURAL BLOCK INJECTION N/A 07/27/2023    Procedure: L5-S1 ILESI;  Surgeon: Melquiades Loving MD;  [facility];  Service: Pain Management     EPIDURAL BLOCK INJECTION Left 08/22/2023    Procedure: LT L5 & S1 TFESI;  Surgeon: Melquiades Loving MD;  [facility];  Service: Pain Management     EPIDURAL BLOCK INJECTION Left 11/07/2023    Procedure: LT L5 & S1 TFESI;  Surgeon: Melquiades Loving MD;  [facility];  Service: Pain Management     IR SPINE AND PAIN PROCEDURE  03/12/2024    IR SPINE AND PAIN PROCEDURE  03/27/2024    IR SPINE AND PAIN PROCEDURE  7/9/2024    IR SPINE AND PAIN PROCEDURE  9/4/2024    NERVE BLOCK Bilateral 12/05/2023    Procedure: BLOCK MEDIAL BRANCH L3, L4, L5 #1;  Surgeon: Melquiades Loving MD;  [facility];  Service: Pain Management     NERVE BLOCK Left 12/28/2023    Procedure: left L5 and S1 transforaminal LUIS;  Surgeon: Melquiades Loving MD;  [facility];  Service: Pain Management     NERVE BLOCK Bilateral  "02/21/2024    Procedure: B/L L3-L5 MBB #2;  Surgeon: Melquiades Loving MD;  [facility];  Service: Pain Management     POSTERIOR LAMINECTOMY THORACIC AND LUMBAR SPINE Left 5/28/2024    Procedure: LEFT L5-S1 FAR LATERAL MICRODISCECTOMY;  Surgeon: James Hussein MD;  [facility];  Service: Neurosurgery    MI ARTHRODESIS COMBINED TQ 1NTRSPC LUMBAR N/A 2/5/2025    Procedure: L5-S1 transforaminal lumbar interbody fusion, possible additional levels, with neuromonitoring and neuronavigation;  Surgeon: Bunny Wilson MD;  [facility];  Service: Neurosurgery    REPLACEMENT TOTAL KNEE Bilateral         02/06/25 1425   PT Last Visit   PT Visit Date 02/06/25   Note Type   Note type Evaluation   Pain Assessment   Pain Assessment Tool 0-10   Pain Score 4   Pain Location/Orientation Orientation: Lower;Location: Back   Hospital Pain Intervention(s) Ambulation/increased activity   Restrictions/Precautions   Weight Bearing Precautions Per Order No   Other Precautions Chair Alarm;Bed Alarm;Spinal precautions;Fall Risk;Pain   Home Living   Type of Home House   Home Layout Two level  (Bi-level. RAYSA and 7 steps to the second floor)   Bathroom Shower/Tub Walk-in shower   Bathroom Toilet Standard   Bathroom Equipment Shower chair   Bathroom Accessibility Accessible   Home Equipment   (no DME)   Prior Function   Level of [place] Independent with ADLs;Independent with functional mobility;Independent with IADLS   Lives With Spouse;Son   Receives Help From Family   IADLs Independent with driving;Independent with meal prep;Independent with medication management   Falls in the last 6 months 0   Vocational Retired   General   Family/Caregiver Present Yes   Cognition   Overall Cognitive Status WFL   Arousal/Participation Alert   Orientation Level Oriented X4   Memory Within functional limits   Following Commands Follows all commands and directions without difficulty   Subjective   Subjective \"I went down for x-ray " "in a w/c\"   RLE Assessment   RLE Assessment WFL   LLE Assessment   LLE Assessment WFL   Bed Mobility   Additional Comments Received OOB in chair. Reports that he did use the log roll technique to get OOB.   Transfers   Sit to Stand 6  Modified independent   Additional items Armrests   Stand to Sit 6  Modified independent   Additional items Armrests   Ambulation/Elevation   Gait pattern WNL   Gait Assistance 7  Independent   Assistive Device None   Distance 400ft   Ambulation/Elevation Additional Comments Initially slow paced gait. Increased speed as patient progressed. Steady. no LOB. Refer to treatment session for stair trial   Balance   Static Sitting Normal   Dynamic Sitting Normal   Static Standing Normal   Dynamic Standing Normal   Ambulatory Normal   Endurance Deficit   Endurance Deficit No   Activity Tolerance   Activity Tolerance Patient tolerated treatment well   Medical Staff Made Aware Emma BAILON   Nurse Made Aware RN, Hillary   Assessment   Prognosis Good   Problem List Pain   Assessment Patient is a 63y/o M who is POD#1 s/p L5-S1 TLIF, instrumentation and PSO. Patient resides with spouse and son in a bi-level home. He is independent at baseline without an AD. Current medical status includes spinal precautions, spinal drain, fall risk, pain, bed/chair alarm. Patient was thoroughly educated on spinal precautions and demosntrations were provided. He completed all mobility at n independent level. He ambulated in the hallway with a steady gait and complete a stair trial. Patient has no further inpatient P.T. needs. Will discharge orders. Level 4 resources. D/C P.T  The patient's AM-PAC Basic Mobility Inpatient Short Form Raw Score is 24. A Raw score of greater than 17 suggests the patient may benefit from discharge to home. Please also refer to the recommendation of the Physical Therapist for safe discharge planning.   Barriers to Discharge None   Goals   Patient Goals To go home   PT Treatment Day 1   Plan "   Treatment/Interventions   (D/C P.T.)   Discharge Recommendation   Rehab Resource Intensity Level, PT No post-acute rehabilitation needs   AM-PAC Basic Mobility Inpatient   Turning in Flat Bed Without Bedrails 4   Lying on Back to Sitting on Edge of Flat Bed Without Bedrails 4   Moving Bed to Chair 4   Standing Up From Chair Using Arms 4   Walk in Room 4   Climb 3-5 Stairs With Railing 4   Basic Mobility Inpatient Raw Score 24   Basic Mobility Standardized Score 57.68   [facility] Level Of Mobility   -HLM Goal 8: Walk 250 feet or more   -HLM Achieved 8: Walk 250 feet ot more   Additional Treatment Session   Start Time 1415   End Time 1425   Treatment Assessment Patient educated on spinal precautions and demonstration provided. Patient and spouse verbalized understanding. patient completed additional sit<>stand transfer mod I level. Ascended/descended 7 stairs with 2 rails nonreciprocal pattern mod I level.   End of Consult   Patient Position at End of Consult Bedside chair;Bed/Chair alarm activated;All needs within reach     Pepper Estrada, PT             Patient Name: Jordi Dias  Today's Date: 2/6/2025

## 2025-02-06 NOTE — PROGRESS NOTES
"Progress Note - Neurosurgery  Name: Jordi Dias 64 y.o. male I MRN: [id number]  Unit/Bed#: -01 I Date of Admission: 2/5/2025   Date of Service: 2/6/2025 I Hospital Day: 1    Assessment & Plan  Other spondylosis with radiculopathy, lumbar region    S/P lumbar fusion  POD#1 s/p L5-S1 TLIF, instrumentation and PSO    Drain output 250  WBC 13  Hgb 13.6  Cr 1.46    -D/C springer  -pain control  -xrays pending this am  -cont with drain mngt  -start keflex tonight  -PT/OT, OOB as tolerated  -D/C IVF at noon      CKD (chronic kidney disease)  Lab Results   Component Value Date    EGFR 49 02/06/2025    EGFR 51 01/15/2025    EGFR 46 05/02/2024    CREATININE 1.46 (H) 02/06/2025    CREATININE 1.42 (H) 01/15/2025    CREATININE 1.54 (H) 05/02/2024   - appears baseline    Pre Diabetes  Lab Results   Component Value Date    HGBA1C 5.8 (H) 01/15/2025       No results for input(s): \"POCGLU\" in the last 72 hours.    Blood Sugar Average: Last 72 hrs:    -not on outpatient meds, diet controlled  -change diet, add poc glucose checks    HTN (hypertension)  -hold lisinopril this am  -IV meds if BP high        Subjective   Doing well  Pain in the back at incision site  Leg pain is significantly improved    Objective :  Temp:  [97.7 °F (36.5 °C)-97.9 °F (36.6 °C)] 97.7 °F (36.5 °C)  HR:  [57-80] 57  BP: (117-165)/() 144/83  Resp:  [10-22] 16  SpO2:  [92 %-98 %] 95 %  O2 Device: None (Room air)    I/O         02/04 0701 02/05 0700 02/05 0701 02/06 0700 02/06 0701 02/07 0700    P.O.  1500 420    I.V. (mL/kg)  3510.4 (35.5)     Total Intake(mL/kg)  5010.4 (50.7) 420 (4.2)    Urine (mL/kg/hr)  3550     Emesis/NG output  100     Drains  250     Total Output  3900     Net  +1110.4 +420                 Lines/Drains/Airways       Active Status       Name Placement date Placement time Site Days    Closed/Suction Drain Inferior;Left;Medial Back Accordion 10 Fr. 02/05/25  1355  Back  less than 1                  Physical Exam  Awake, " alert, NAD  AT, NC  RRR  CTAB  Abdomen soft, non tender  Neuro: incision c/d/I with dressing and drain with ss output  Strength in LE is 5/5      Lab Results: I have reviewed the following results:  Recent Labs     02/06/25 0006 02/06/25 0519   WBC  --  13.00*   HGB  --  13.6   HCT  --  40.2    194   SODIUM 135  --    K 4.4  --      --    CO2 24  --    BUN 20  --    CREATININE 1.46*  --    GLUC 170*  --        Imaging Results Review: No pertinent imaging studies reviewed.  Other Study Results Review: No additional pertinent studies reviewed.    VTE Pharmacologic Prophylaxis: VTE covered by:  heparin (porcine), Subcutaneous, 5,000 Units at 02/06/25 0519     VTE Mechanical Prophylaxis: sequential compression device

## 2025-02-06 NOTE — PLAN OF CARE
Problem: PAIN - ADULT  Goal: Verbalizes/displays adequate comfort level or baseline comfort level  Description: Interventions:  - Encourage patient to monitor pain and request assistance  - Assess pain using appropriate pain scale  - Administer analgesics based on type and severity of pain and evaluate response  - Implement non-pharmacological measures as appropriate and evaluate response  - Consider cultural and social influences on pain and pain management  - Notify physician/advanced practitioner if interventions unsuccessful or patient reports new pain  Outcome: Progressing     Problem: INFECTION - ADULT  Goal: Absence or prevention of progression during hospitalization  Description: INTERVENTIONS:  - Assess and monitor for signs and symptoms of infection  - Monitor lab/diagnostic results  - Monitor all insertion sites, i.e. indwelling lines, tubes, and drains  - Monitor endotracheal if appropriate and nasal secretions for changes in amount and color  - [place] appropriate cooling/warming therapies per order  - Administer medications as ordered  - Instruct and encourage patient and family to use good hand hygiene technique  - Identify and instruct in appropriate isolation precautions for identified infection/condition  Outcome: Progressing  Goal: Absence of fever/infection during neutropenic period  Description: INTERVENTIONS:  - Monitor WBC    Outcome: Progressing     Problem: SAFETY ADULT  Goal: Patient will remain free of falls  Description: INTERVENTIONS:  - Educate patient/family on patient safety including physical limitations  - Instruct patient to call for assistance with activity   - Consult OT/PT to assist with strengthening/mobility   - Keep Call bell within reach  - Keep bed low and locked with side rails adjusted as appropriate  - Keep care items and personal belongings within reach  - Initiate and maintain comfort rounds  - Make Fall Risk Sign visible to staff  - Offer Toileting every 2 Hours,  in advance of need  - Initiate/Maintain bed/chair alarm  - Obtain necessary fall risk management equipment: yellow socks  - Apply yellow socks and bracelet for high fall risk patients  - Consider moving patient to room near nurses station  Outcome: Progressing  Goal: Maintain or return to baseline ADL function  Description: INTERVENTIONS:  -  Assess patient's ability to carry out ADLs; assess patient's baseline for ADL function and identify physical deficits which impact ability to perform ADLs (bathing, care of mouth/teeth, toileting, grooming, dressing, etc.)  - Assess/evaluate cause of self-care deficits   - Assess range of motion  - Assess patient's mobility; develop plan if impaired  - Assess patient's need for assistive devices and provide as appropriate  - Encourage maximum independence but intervene and supervise when necessary  - Involve family in performance of ADLs  - Assess for home care needs following discharge   - Consider OT consult to assist with ADL evaluation and planning for discharge  - Provide patient education as appropriate  Outcome: Progressing  Goal: Maintains/Returns to pre admission functional level  Description: INTERVENTIONS:  - Perform AM-PAC 6 Click Basic Mobility/ Daily Activity assessment daily.  - Set and communicate daily mobility goal to care team and patient/family/caregiver.   - Collaborate with rehabilitation services on mobility goals if consulted  - Perform Range of Motion 3 times a day.  - Reposition patient every 2 hours.  - Dangle patient 3 times a day  - Stand patient 3 times a day  - Ambulate patient 3 times a day  - Out of bed to chair 3 times a day   - Out of bed for meals 3 times a day  - Out of bed for toileting  - Record patient progress and toleration of activity level   Outcome: Progressing     Problem: DISCHARGE PLANNING  Goal: Discharge to home or other facility with appropriate resources  Description: INTERVENTIONS:  - Identify barriers to discharge w/patient  and caregiver  - Arrange for needed discharge resources and transportation as appropriate  - Identify discharge learning needs (meds, wound care, etc.)  - Arrange for interpretive services to assist at discharge as needed  - Refer to Case Management Department for coordinating discharge planning if the patient needs post-hospital services based on physician/advanced practitioner order or complex needs related to functional status, cognitive ability, or social support system  Outcome: Progressing     Problem: Knowledge Deficit  Goal: Patient/family/caregiver demonstrates understanding of disease process, treatment plan, medications, and discharge instructions  Description: Complete learning assessment and assess knowledge base.  Interventions:  - Provide teaching at level of understanding  - Provide teaching via preferred learning methods  Outcome: Progressing

## 2025-02-06 NOTE — ASSESSMENT & PLAN NOTE
POD#1 s/p L5-S1 TLIF, instrumentation and PSO    Drain output 250  WBC 13  Hgb 13.6  Cr 1.46    -D/C springer  -pain control  -xrays pending this am  -cont with drain mngt  -start keflex tonight  -PT/OT, OOB as tolerated  -D/C IVF at noon

## 2025-02-06 NOTE — ASSESSMENT & PLAN NOTE
"Lab Results   Component Value Date    HGBA1C 5.8 (H) 01/15/2025       No results for input(s): \"POCGLU\" in the last 72 hours.    Blood Sugar Average: Last 72 hrs:    -not on outpatient meds, diet controlled  -change diet, add poc glucose checks    "

## 2025-02-06 NOTE — ASSESSMENT & PLAN NOTE
Lab Results   Component Value Date    EGFR 49 02/06/2025    EGFR 51 01/15/2025    EGFR 46 05/02/2024    CREATININE 1.46 (H) 02/06/2025    CREATININE 1.42 (H) 01/15/2025    CREATININE 1.54 (H) 05/02/2024   - appears baseline

## 2025-02-06 NOTE — UTILIZATION REVIEW
Initial Clinical Review    Elective inpatient surgical procedure  Age/Sex: 64 y.o. male  Surgery Date: 2/5/25  Procedure: L5-S1 Kevin-Myles Osteotomies and transforaminal interbody fusion  2) Bilateral pedicle screws placements L5 and S1  3) Arthrodesis L5-S1 with autograft and Cyn  4) Use of neuromonitoring  5) Use of neuronavigation (Xenoport with O-arm)  6) Use of fluoroscopy <1hr  Anesthesia: general   Operative Findings: L5-S1 disc degeneration with disc space height collapse with severe right and moderate left foraminal stenosis     POD#1 Progress Note 2/6/25:   2/6/25 POD #1  s/p L5-S1 TLIF, instrumentation and PSO/CKD/pre Diabetes/hypertension.   Today with pain in back at incision sites.   Leg pain is improved.  Neuro: incision c/d/I with dressing and drain with ss output.  Drain output 250 cc.  Strength in LE is 5/5.  Wbc 13.  Creatinine 1.46 at baseline.   Plan:  pain control.  Start Keflex tonight.  Drain management.   PT/OT. Trend BMP and continue IVF until noon.  Hold lisinopril today, trend BP  Start SSI, Diabetic diet.      Admission Orders: Date/Time/Statement:   Admission Orders (From admission, onward)       Ordered        02/05/25 1031  Inpatient Admission  Once                          Orders Placed This Encounter   Procedures    Inpatient Admission     Standing Status:   Standing     Number of Occurrences:   1     Level of Care:   Med Surg [16]     Estimated length of stay:   More than 2 Midnights     Certification:   I certify that inpatient services are medically necessary for this patient for a duration of greater than two midnights. See H&P and MD Progress Notes for additional information about the patient's course of treatment.     Diet: consistent carb controlled diet   Mobility: OOB three times a day.  PT/OT  DVT Prophylaxis: Bilateral SCDs    Medications/Pain Control:   Scheduled Medications:  acetaminophen, 975 mg, Oral, Q8H CONOR  cefazolin, 1,000 mg, Intravenous,  Q8H  cephalexin, 500 mg, Oral, Q6H CONOR  docusate sodium, 100 mg, Oral, BID  heparin (porcine), 5,000 Units, Subcutaneous, Q8H CONOR  [Held by provider] lisinopril, 10 mg, Oral, QAM  methocarbamol, 750 mg, Oral, Q6H CONOR  pravastatin, 40 mg, Oral, Daily  venlafaxine, 37.5 mg, Oral, HS    Continuous IV Infusions:  sodium chloride, 125 mL/hr, Intravenous, Continuous    PRN Meds: not used.   lactated ringers, 1,000 mL, Intravenous, Once PRN   And  lactated ringers, 1,000 mL, Intravenous, Once PRN  lidocaine, 1 patch, Topical, Daily PRN  morphine injection, 2 mg, Intravenous, Q4H PRN  ondansetron, 4 mg, Intravenous, Q6H PRN  oxyCODONE, 10 mg, Oral, Q4H PRN  oxyCODONE, 5 mg, Oral, Q4H PRN  sodium chloride, 1,000 mL, Intravenous, Once PRN   And  sodium chloride, 1,000 mL, Intravenous, Once PRN    Neuro checks every 4 hours  Drain management     Vital Signs (last 3 days)       Date/Time Temp Pulse Resp BP MAP (mmHg) SpO2 O2 Flow Rate (L/min) O2 Device Cardiac (WDL) Patient Position - Orthostatic VS Lalo Coma Scale Score Pain    02/06/25 0912 -- -- -- -- -- -- -- -- -- -- -- 5    02/06/25 07:46:46 -- 57 -- 144/83 103 95 % -- -- -- Lying -- --    02/06/25 05:21:59 97.7 °F (36.5 °C) 63 16 117/73 88 94 % -- -- -- Lying -- 2    02/06/25 0007 -- -- -- -- -- -- -- -- -- -- -- Med Not Given for Pain - for MAR use only    02/05/25 22:30:48 97.9 °F (36.6 °C) 69 -- 125/70 88 92 % -- None (Room air) -- Lying -- --    02/05/25 1953 -- -- -- -- -- -- -- None (Room air) -- -- -- --    02/05/25 19:29:16 97.9 °F (36.6 °C) 78 -- 125/78 94 95 % -- None (Room air) -- Lying -- 3    02/05/25 19:28:55 97.9 °F (36.6 °C) 80 -- 125/78 94 94 % -- -- -- -- -- --    02/05/25 19:28:27 97.9 °F (36.6 °C) 77 -- 125/78 94 94 % -- -- -- -- -- --    02/05/25 1702 -- -- -- -- -- 93 % -- None (Room air) -- -- 15 3    02/05/25 16:32:47 97.9 °F (36.6 °C) 68 -- 135/79 98 92 % -- None (Room air) -- Lying -- --    02/05/25 1615 -- 62 12 133/79 102 98 % -- -- -- --  -- --    02/05/25 1600 -- 64 14 147/83 108 97 % -- -- -- -- -- --    02/05/25 1545 -- 66 17 144/103 119 93 % -- -- -- -- -- --    02/05/25 1530 -- 68 15 145/79 105 96 % -- None (Room air) WDL -- 15 6    02/05/25 1518 -- -- -- -- -- -- -- -- -- -- -- 7    02/05/25 1515 -- 70 22 146/75 102 95 % -- -- -- -- -- --    02/05/25 1508 -- 72 17 165/79 113 94 % -- -- -- -- -- --    02/05/25 1458 -- 72 19 155/90 115 95 % -- None (Room air) -- -- 15 No Pain    02/05/25 1448 97.8 °F (36.6 °C) 78 10 159/90 118 98 % 5 L/min Simple mask -- -- 14 No Pain    02/05/25 0907 96.9 °F (36.1 °C) 48 16 154/74 -- 97 % -- None (Room air) -- -- -- --          Weight (last 2 days)       Date/Time Weight    02/06/25 0500 98.9 (218.04)    02/05/25 0907 96.7 (213.2)            Pertinent Labs/Diagnostic Test Results:   Radiology:  XR lumbar spine 2 or 3 views   Final Interpretation by Dustin Siddiqui MD (02/06 0952)      Stable postoperative changes.  No evidence of osseous or hardware complication.         Computerized Assisted Algorithm (CAA) may have been used to analyze all applicable images.         Workstation performed: [id number]         XR spine lumbar 2 or 3 views injury   Final Interpretation by Micheal Ga MD (02/05 1451)      Fluoroscopy provided for procedure guidance.      Please refer to the separate procedure note for additional details.                     Workstation performed: [id number]           Cardiology:  No orders to display     GI:  No orders to display           Results from last 7 days   Lab Units 02/06/25  0519 02/06/25  0006   WBC Thousand/uL 13.00*  --    HEMOGLOBIN g/dL 13.6  --    HEMATOCRIT % 40.2  --    PLATELETS Thousands/uL 194 199     Results from last 7 days   Lab Units 02/06/25  0006   SODIUM mmol/L 135   POTASSIUM mmol/L 4.4   CHLORIDE mmol/L 106   CO2 mmol/L 24   ANION GAP mmol/L 5   BUN mg/dL 20   CREATININE mg/dL 1.46*   EGFR ml/min/1.73sq m 49   CALCIUM mg/dL 9.1     Results from last 7  days   Lab Units 02/06/25  0006   GLUCOSE RANDOM mg/dL 170*       Network Utilization Review Department  ATTENTION: Please call with any questions or concerns to [phone] and carefully listen to the prompts so that you are directed to the right person. All voicemails are confidential.   For Discharge needs, contact Care Management DC Support Team at [phone] opt. 2  Send all requests for admission clinical reviews, approved or denied determinations and any other requests to dedicated fax number below belonging to the campus where the patient is receiving treatment. List of dedicated fax numbers for the Facilities:  FACILITY NAME UR FAX NUMBER   ADMISSION DENIALS (Administrative/Medical Necessity) [phone]   DISCHARGE SUPPORT TEAM (NETWORK) [phone]   PARENT CHILD HEALTH (Maternity/NICU/Pediatrics) [phone]   [facility] [phone]   [facility] [phone]   [facility] [phone]   [facility] [phone]   [facility] [phone]   [facility] [phone]   [facility] [phone]   [facility] [phone]   [facility] [phone]   [facility] [phone]   [facility] [phone]   [facility] [phone]

## 2025-02-06 NOTE — ANESTHESIA POSTPROCEDURE EVALUATION
Post-Op Assessment Note    CV Status:  Stable    Pain management: adequate       Mental Status:  Alert and awake   Hydration Status:  Euvolemic   PONV Controlled:  Controlled   Airway Patency:  Patent     Post Op Vitals Reviewed: Yes    No anethesia notable event occurred.    Staff: Anesthesiologist           Last Filed PACU Vitals:  Vitals Value Taken Time   Temp 97.8 °F (36.6 °C) 02/05/25 1448   Pulse 66 02/05/25 1617   /79 02/05/25 1615   Resp 18 02/05/25 1617   SpO2 95 % 02/05/25 1617   Vitals shown include unfiled device data.    Modified Imtiaz:     Vitals Value Taken Time   Activity 2 02/05/25 1530   Respiration 2 02/05/25 1530   Circulation 2 02/05/25 1530   Consciousness 2 02/05/25 1530   Oxygen Saturation 2 02/05/25 1530     Modified Imtiaz Score: 10

## 2025-02-07 VITALS
DIASTOLIC BLOOD PRESSURE: 86 MMHG | TEMPERATURE: 97.5 F | WEIGHT: 215.4 LBS | HEART RATE: 42 BPM | RESPIRATION RATE: 18 BRPM | SYSTOLIC BLOOD PRESSURE: 135 MMHG | HEIGHT: 71 IN | BODY MASS INDEX: 30.15 KG/M2 | OXYGEN SATURATION: 95 %

## 2025-02-07 LAB
ANION GAP SERPL CALCULATED.3IONS-SCNC: 6 MMOL/L (ref 4–13)
BASOPHILS # BLD AUTO: 0.02 THOUSANDS/ΜL (ref 0–0.1)
BASOPHILS NFR BLD AUTO: 0 % (ref 0–1)
BUN SERPL-MCNC: 22 MG/DL (ref 5–25)
CALCIUM SERPL-MCNC: 9.6 MG/DL (ref 8.4–10.2)
CHLORIDE SERPL-SCNC: 108 MMOL/L (ref 96–108)
CO2 SERPL-SCNC: 25 MMOL/L (ref 21–32)
CREAT SERPL-MCNC: 1.25 MG/DL (ref 0.6–1.3)
EOSINOPHIL # BLD AUTO: 0.03 THOUSAND/ΜL (ref 0–0.61)
EOSINOPHIL NFR BLD AUTO: 0 % (ref 0–6)
ERYTHROCYTE [DISTWIDTH] IN BLOOD BY AUTOMATED COUNT: 11.9 % (ref 11.6–15.1)
GFR SERPL CREATININE-BSD FRML MDRD: 59 ML/MIN/1.73SQ M
GLUCOSE SERPL-MCNC: 107 MG/DL (ref 65–140)
GLUCOSE SERPL-MCNC: 92 MG/DL (ref 65–140)
HCT VFR BLD AUTO: 40.7 % (ref 36.5–49.3)
HGB BLD-MCNC: 13.8 G/DL (ref 12–17)
IMM GRANULOCYTES # BLD AUTO: 0.08 THOUSAND/UL (ref 0–0.2)
IMM GRANULOCYTES NFR BLD AUTO: 1 % (ref 0–2)
LYMPHOCYTES # BLD AUTO: 1.93 THOUSANDS/ΜL (ref 0.6–4.47)
LYMPHOCYTES NFR BLD AUTO: 17 % (ref 14–44)
MAGNESIUM SERPL-MCNC: 1.9 MG/DL (ref 1.9–2.7)
MCH RBC QN AUTO: 30.6 PG (ref 26.8–34.3)
MCHC RBC AUTO-ENTMCNC: 33.9 G/DL (ref 31.4–37.4)
MCV RBC AUTO: 90 FL (ref 82–98)
MONOCYTES # BLD AUTO: 1.1 THOUSAND/ΜL (ref 0.17–1.22)
MONOCYTES NFR BLD AUTO: 10 % (ref 4–12)
NEUTROPHILS # BLD AUTO: 8.2 THOUSANDS/ΜL (ref 1.85–7.62)
NEUTS SEG NFR BLD AUTO: 72 % (ref 43–75)
NRBC BLD AUTO-RTO: 0 /100 WBCS
PLATELET # BLD AUTO: 182 THOUSANDS/UL (ref 149–390)
PMV BLD AUTO: 10.1 FL (ref 8.9–12.7)
POTASSIUM SERPL-SCNC: 4.5 MMOL/L (ref 3.5–5.3)
RBC # BLD AUTO: 4.51 MILLION/UL (ref 3.88–5.62)
SODIUM SERPL-SCNC: 139 MMOL/L (ref 135–147)
WBC # BLD AUTO: 11.36 THOUSAND/UL (ref 4.31–10.16)

## 2025-02-07 PROCEDURE — NC001 PR NO CHARGE: Performed by: STUDENT IN AN ORGANIZED HEALTH CARE EDUCATION/TRAINING PROGRAM

## 2025-02-07 PROCEDURE — 99024 POSTOP FOLLOW-UP VISIT: CPT | Performed by: STUDENT IN AN ORGANIZED HEALTH CARE EDUCATION/TRAINING PROGRAM

## 2025-02-07 PROCEDURE — 82948 REAGENT STRIP/BLOOD GLUCOSE: CPT

## 2025-02-07 PROCEDURE — 83735 ASSAY OF MAGNESIUM: CPT | Performed by: PHYSICIAN ASSISTANT

## 2025-02-07 PROCEDURE — 85025 COMPLETE CBC W/AUTO DIFF WBC: CPT | Performed by: PHYSICIAN ASSISTANT

## 2025-02-07 PROCEDURE — 80048 BASIC METABOLIC PNL TOTAL CA: CPT | Performed by: PHYSICIAN ASSISTANT

## 2025-02-07 RX ORDER — OXYCODONE HYDROCHLORIDE 5 MG/1
5 TABLET ORAL EVERY 4 HOURS PRN
Qty: 15 TABLET | Refills: 0 | Status: SHIPPED | OUTPATIENT
Start: 2025-02-07 | End: 2025-02-10

## 2025-02-07 RX ORDER — ACETAMINOPHEN 500 MG
500 TABLET ORAL EVERY 6 HOURS PRN
COMMUNITY
Start: 2025-02-07

## 2025-02-07 RX ORDER — CEPHALEXIN 500 MG/1
500 CAPSULE ORAL EVERY 6 HOURS SCHEDULED
Qty: 45 CAPSULE | Refills: 0 | Status: SHIPPED | OUTPATIENT
Start: 2025-02-07 | End: 2025-02-19

## 2025-02-07 RX ORDER — POLYETHYLENE GLYCOL 3350 17 G/17G
17 POWDER, FOR SOLUTION ORAL DAILY
COMMUNITY
Start: 2025-02-08

## 2025-02-07 RX ORDER — BISACODYL 5 MG/1
5 TABLET, DELAYED RELEASE ORAL DAILY PRN
Qty: 30 TABLET | Refills: 0 | Status: SHIPPED | OUTPATIENT
Start: 2025-02-07

## 2025-02-07 RX ORDER — METHOCARBAMOL 750 MG/1
750 TABLET, FILM COATED ORAL EVERY 8 HOURS PRN
Qty: 15 TABLET | Refills: 0 | Status: SHIPPED | OUTPATIENT
Start: 2025-02-07 | End: 2025-02-12

## 2025-02-07 RX ADMIN — HEPARIN SODIUM 5000 UNITS: 5000 INJECTION, SOLUTION INTRAVENOUS; SUBCUTANEOUS at 05:01

## 2025-02-07 RX ADMIN — CEPHALEXIN 500 MG: 250 CAPSULE ORAL at 05:01

## 2025-02-07 RX ADMIN — POLYETHYLENE GLYCOL 3350 17 G: 17 POWDER, FOR SOLUTION ORAL at 07:53

## 2025-02-07 RX ADMIN — LISINOPRIL 10 MG: 10 TABLET ORAL at 07:53

## 2025-02-07 RX ADMIN — DOCUSATE SODIUM 100 MG: 100 CAPSULE, LIQUID FILLED ORAL at 07:54

## 2025-02-07 RX ADMIN — METHOCARBAMOL 750 MG: 500 TABLET ORAL at 05:01

## 2025-02-07 RX ADMIN — METHOCARBAMOL 750 MG: 500 TABLET ORAL at 10:04

## 2025-02-07 RX ADMIN — CEPHALEXIN 500 MG: 250 CAPSULE ORAL at 11:14

## 2025-02-07 RX ADMIN — ACETAMINOPHEN 975 MG: 325 TABLET, FILM COATED ORAL at 07:54

## 2025-02-07 RX ADMIN — HEPARIN SODIUM 5000 UNITS: 5000 INJECTION, SOLUTION INTRAVENOUS; SUBCUTANEOUS at 13:05

## 2025-02-07 RX ADMIN — PRAVASTATIN SODIUM 40 MG: 40 TABLET ORAL at 07:54

## 2025-02-07 NOTE — QUICK NOTE
Drain dc'ed  Pt feeling well  No leg pain  Ambulating well  Wants to be discharged  Discharge instructions reviewed    Sweta Lincoln

## 2025-02-07 NOTE — ASSESSMENT & PLAN NOTE
POD 2 s/p L5-S1 TLIF, pedicle screws with PSO    Drain output 180 in last 24 hours  Wbc  11.36  Hgb  13.8  Cr 1.25    Pain controlled  Xrays look satisfactory  Cont Keflex  Cont drain, may remove later tonight if patient wants to be discharged  Having constipation, miralax added, Mg pending this am

## 2025-02-07 NOTE — PLAN OF CARE
Problem: PAIN - ADULT  Goal: Verbalizes/displays adequate comfort level or baseline comfort level  Description: Interventions:  - Encourage patient to monitor pain and request assistance  - Assess pain using appropriate pain scale  - Administer analgesics based on type and severity of pain and evaluate response  - Implement non-pharmacological measures as appropriate and evaluate response  - Consider cultural and social influences on pain and pain management  - Notify physician/advanced practitioner if interventions unsuccessful or patient reports new pain  Outcome: Progressing     Problem: INFECTION - ADULT  Goal: Absence or prevention of progression during hospitalization  Description: INTERVENTIONS:  - Assess and monitor for signs and symptoms of infection  - Monitor lab/diagnostic results  - Monitor all insertion sites, i.e. indwelling lines, tubes, and drains  - Monitor endotracheal if appropriate and nasal secretions for changes in amount and color  - [place] appropriate cooling/warming therapies per order  - Administer medications as ordered  - Instruct and encourage patient and family to use good hand hygiene technique  - Identify and instruct in appropriate isolation precautions for identified infection/condition  Outcome: Progressing  Goal: Absence of fever/infection during neutropenic period  Description: INTERVENTIONS:  - Monitor WBC    Outcome: Progressing     Problem: SAFETY ADULT  Goal: Patient will remain free of falls  Description: INTERVENTIONS:  - Educate patient/family on patient safety including physical limitations  - Instruct patient to call for assistance with activity   - Consult OT/PT to assist with strengthening/mobility   - Keep Call bell within reach  - Keep bed low and locked with side rails adjusted as appropriate  - Keep care items and personal belongings within reach  - Initiate and maintain comfort rounds  - Make Fall Risk Sign visible to staff  - Apply yellow socks and bracelet  for high fall risk patients  - Consider moving patient to room near nurses station  Outcome: Progressing  Goal: Maintain or return to baseline ADL function  Description: INTERVENTIONS:  -  Assess patient's ability to carry out ADLs; assess patient's baseline for ADL function and identify physical deficits which impact ability to perform ADLs (bathing, care of mouth/teeth, toileting, grooming, dressing, etc.)  - Assess/evaluate cause of self-care deficits   - Assess range of motion  - Assess patient's mobility; develop plan if impaired  - Assess patient's need for assistive devices and provide as appropriate  - Encourage maximum independence but intervene and supervise when necessary  - Involve family in performance of ADLs  - Assess for home care needs following discharge   - Consider OT consult to assist with ADL evaluation and planning for discharge  - Provide patient education as appropriate  Outcome: Progressing  Goal: Maintains/Returns to pre admission functional level  Description: INTERVENTIONS:  - Perform AM-PAC 6 Click Basic Mobility/ Daily Activity assessment daily.  - Set and communicate daily mobility goal to care team and patient/family/caregiver.   - Collaborate with rehabilitation services on mobility goals if consulted  - Perform Range of Motion 2 times a day.  - Reposition patient every 2 hours.  - Dangle patient 2 times a day  - Stand patient 2 times a day  - Ambulate patient 2 times a day  - Out of bed to chair 2 times a day   - Out of bed for meals 2 times a day  - Out of bed for toileting  - Record patient progress and toleration of activity level   Outcome: Progressing     Problem: DISCHARGE PLANNING  Goal: Discharge to home or other facility with appropriate resources  Description: INTERVENTIONS:  - Identify barriers to discharge w/patient and caregiver  - Arrange for needed discharge resources and transportation as appropriate  - Identify discharge learning needs (meds, wound care, etc.)  -  Arrange for interpretive services to assist at discharge as needed  - Refer to Case Management Department for coordinating discharge planning if the patient needs post-hospital services based on physician/advanced practitioner order or complex needs related to functional status, cognitive ability, or social support system  Outcome: Progressing     Problem: Knowledge Deficit  Goal: Patient/family/caregiver demonstrates understanding of disease process, treatment plan, medications, and discharge instructions  Description: Complete learning assessment and assess knowledge base.  Interventions:  - Provide teaching at level of understanding  - Provide teaching via preferred learning methods  Outcome: Progressing

## 2025-02-07 NOTE — PROGRESS NOTES
Progress Note - Neurosurgery   Name: Jordi Dias 64 y.o. male I MRN: [id number]  Unit/Bed#: -01 I Date of Admission: 2/5/2025   Date of Service: 2/7/2025 I Hospital Day: 2    Assessment & Plan  S/P lumbar fusion  POD 2 s/p L5-S1 TLIF, pedicle screws with PSO    Drain output 180 in last 24 hours  Wbc  11.36  Hgb  13.8  Cr 1.25    Pain controlled  Xrays look satisfactory  Cont Keflex  Cont drain, may remove later tonight if patient wants to be discharged  Having constipation, miralax added, Mg pending this am    Other spondylosis with radiculopathy, lumbar region  -see above  CKD (chronic kidney disease)  Lab Results   Component Value Date    EGFR 59 02/07/2025    EGFR 49 02/06/2025    EGFR 51 01/15/2025    CREATININE 1.25 02/07/2025    CREATININE 1.46 (H) 02/06/2025    CREATININE 1.42 (H) 01/15/2025   -Cr improving  -pt tolerating PO  -retstart lisinopril  -[facility]   Pre Diabetes  Lab Results   Component Value Date    HGBA1C 5.8 (H) 01/15/2025       Recent Labs     02/06/25  1053 02/06/25  1619 02/07/25  0710   POCGLU 89 110 92       Blood Sugar Average: Last 72 hrs:  (P) 97  -controlled on diet  HTN (hypertension)  -well controlled  -ok to restart lisinopril      Subjective   Doing well this am  Ambulating  C/o feeling constipated  Legs feel better than before    Objective :  Temp:  [97.5 °F (36.4 °C)-98.1 °F (36.7 °C)] 97.5 °F (36.4 °C)  HR:  [42-63] 42  BP: (124-135)/(69-86) 135/86  Resp:  [16-18] 18  SpO2:  [94 %-97 %] 95 %  O2 Device: None (Room air)    I/O         02/05 0701  02/06 0700 02/06 0701  02/07 0700 02/07 0701  02/08 0700    P.O. 1500 1800     I.V. (mL/kg) 3510.4 (35.5)      Total Intake(mL/kg) 5010.4 (50.7) 1800 (18.4)     Urine (mL/kg/hr) 3550 3525 (1.5)     Emesis/NG output 100      Drains 250 180 25    Total Output 3900 3705 25    Net +1110.4 -1905 -25           Unmeasured Urine Occurrence  1 x           Lines/Drains/Airways       Active Status        Name Placement date Placement time Site Days    Closed/Suction Drain Inferior;Left;Medial Back Accordion 10 Fr. 02/05/25  1355  Back  1                  Physical Exam  General Appearance: NAD, cooperative, alert  Eyes: Anicteric, conjunctiva clear  HENT:  Normocephalic, atraumatic, normal mucosa.  external ears normal, external nose normal, no drainage  Neck:    Supple, symmetrical, trachea midline  Resp:  Clear to auscultation bilaterally; no rales, rhonchi or wheezing; respirations unlabored   CV:  S1 S2, Regular rate and rhythm; no murmur, rub, or gallop.  GI:  Soft, non-tender, non-distended; normal bowel sounds; no masses, no organomegaly   Musculoskeletal: No cyanosis, clubbing or edema. Normal ROM.  Skin:   No jaundice, rashes, or lesions   Psych: Normal affect, good eye contact  Neuro: No gross deficits, AAOx3, speech nl, hoyos  Strength in LE is 5/5 bilaterally  Sensation intact to LT  Drain output is ss      Lab Results: I have reviewed the following results:  Recent Labs     02/07/25  0454   WBC 11.36*   HGB 13.8   HCT 40.7      SODIUM 139   K 4.5      CO2 25   BUN 22   CREATININE 1.25   GLUC 107       Imaging Results Review: I reviewed radiology reports from this admission including: xray(s).  Other Study Results Review: No additional pertinent studies reviewed.    VTE Pharmacologic Prophylaxis: VTE covered by:  heparin (porcine), Subcutaneous, 5,000 Units at 02/07/25 0501     VTE Mechanical Prophylaxis: sequential compression device

## 2025-02-07 NOTE — ASSESSMENT & PLAN NOTE
Lab Results   Component Value Date    HGBA1C 5.8 (H) 01/15/2025       Recent Labs     02/06/25  1053 02/06/25  1619 02/07/25  0710   POCGLU 89 110 92       Blood Sugar Average: Last 72 hrs:  (P) 97  -controlled on diet

## 2025-02-07 NOTE — ASSESSMENT & PLAN NOTE
Lab Results   Component Value Date    EGFR 59 02/07/2025    EGFR 49 02/06/2025    EGFR 51 01/15/2025    CREATININE 1.25 02/07/2025    CREATININE 1.46 (H) 02/06/2025    CREATININE 1.42 (H) 01/15/2025   -Cr improving  -pt tolerating PO  -retstart lisinopril  -[facility]

## 2025-02-07 NOTE — DISCHARGE INSTR - AVS FIRST PAGE
Discharge Instructions  Lumbar decompression and fusion  (laminectomy/laminotomy/foraminotomy/discectomy)      Surgical incisional care:  Maintain dressing in place for 3 days. On postoperative day 3, dressing may be removed and incision may be left open to air.  Keep incision clean and dry. Avoid applying creams, lotion or antiseptic to incision area.  Check the wound daily. If the incision becomes red, swollen, tender, warm, or has increased drainage please notify physician immediately.  If steri-strips are in place, allow them to fall off. If still in place at two week postoperative visit, we will remove them.  May shower 3 days after surgery, but do not soak in a tub and no swimming.  Incision may be cleaned with water and a mild antimicrobial soap using a clean washcloth. Incision is to be gently patted dry with a clean towel.   Continue to change bed linens and pajamas more frequently. Wear clean clothes daily.     Activity Restrictions:  No heavy lifting greater than 10lbs and no strenuous activities until cleared.   No bending or twisting. No BLTs (bending, lifting, twisting). Avoid pushing/pulling movements.  May walk as tolerated. Encourage at least 4 short walks per day.   No driving for at least 2 weeks or until cleared by physician.    Postoperative medication:  Take pain medications to relieve incision pain, and muscle relaxants to prevent spasms as directed. Please see after visit summary (AVS) for details.   Do not operate heavy machinery or vehicles while taking sedating medications.  Use a bowel regimen while on opioids as they induce constipation. Ie. Senokot-S, Miralax, Colace, etc. Increase fiber and water intake.   Do not take ibuprofen, Naproxen/Aleve or any NSAID until cleared by surgeon. May take Tylenol instead.  If taking Coumadin, Aspirin, or Plavix, you may resume these medications when cleared by Neurosurgery.    Follow-up as scheduled for a 2 week incision check. Follow-up as scheduled  for 6 week postoperative visit.     **Please notify MD if you experience a fever 101F, chills or have increased pain, numbness, tingling, or weakness in your legs and/or bowel/bladder dysfunction/incontinence**

## 2025-02-07 NOTE — DISCHARGE SUMMARY
Discharge Summary - Surgery-General   Name: Jordi Dias 64 y.o. male I MRN: [id number]  Unit/Bed#: -01 I Date of Admission: 2/5/2025   Date of Service: 2/7/2025 I Hospital Day: 2    Admission Date: 2/5/2025 0843  Discharge Date: 02/07/25  Admitting Diagnosis: Other spondylosis with radiculopathy, lumbar region [M47.26]  Chronic right-sided low back pain with right-sided sciatica [M54.41, G89.29]  Discharge Diagnosis:   Medical Problems       Resolved Problems  Date Reviewed: 9/20/2024   None         HPI: Patient with chronic low back pain. May 2022 began to have severe pain in the left low back and left lower extremity. Had conservative treatment with injections on the left. After the injection the left improved a little bit however the right was causing him issues. He saw Dr. Hussein, who wanted to address the left side first. He underwent a left sided far lateral L5-S1 microdiscectomy. The left sided symptoms completely resolved. Currently pain goes from the right lower back to the buttock to the lateral thigh, leg to the latera aspect of his foot to the toes.     Procedures Performed:   1) L5-S1 Kevin-Myles Osteotomies and transforaminal interbody fusion  2) Bilateral pedicle screws placements L5 and S1  3) Arthrodesis L5-S1 with autograft and Cyn  4) Use of neuromonitoring  5) Use of neuronavigation (Safer Minicabs with O-arm)  6) Use of fluoroscopy <1hr         Summary of Hospital Course: Patient did well intra-op. Reyes was discontinued POD 1. Xrays were done and patient was doing well. He was ambulating well and he was started on Keflex. Patient did have hx of CKD and so lisinopril was held. IVF was continued for the morning. Patient was doing very well. POD 2, the patient felt comfortable and leg pain was improved. Xrays looked well. Drain was discontinued and patient was discharged with abx and pain meds.    Significant Findings, Care, Treatment and Services Provided: PT/OT    Complications:  none    Condition at Discharge: good       Discharge instructions/Information to patient and family:   See After Visit Summary (AVS) for information provided to patient and family.      Provisions for Follow-Up Care:  See after visit summary for information related to follow-up care and any pertinent home health orders.      PCP: Linnea Godwin MD    Disposition: Home    Planned Readmission: No     Discharge Medications:  See after visit summary for reconciled discharge medications provided to patient and family.      Discharge Statement:  I have spent a total time of 15 minutes in caring for this patient on the day of the visit/encounter. .

## 2025-02-07 NOTE — PLAN OF CARE
Problem: PAIN - ADULT  Goal: Verbalizes/displays adequate comfort level or baseline comfort level  Description: Interventions:  - Encourage patient to monitor pain and request assistance  - Assess pain using appropriate pain scale  - Administer analgesics based on type and severity of pain and evaluate response  - Implement non-pharmacological measures as appropriate and evaluate response  - Consider cultural and social influences on pain and pain management  - Notify physician/advanced practitioner if interventions unsuccessful or patient reports new pain  Outcome: Progressing     Problem: INFECTION - ADULT  Goal: Absence or prevention of progression during hospitalization  Description: INTERVENTIONS:  - Assess and monitor for signs and symptoms of infection  - Monitor lab/diagnostic results  - Monitor all insertion sites, i.e. indwelling lines, tubes, and drains  - Monitor endotracheal if appropriate and nasal secretions for changes in amount and color  - [place] appropriate cooling/warming therapies per order  - Administer medications as ordered  - Instruct and encourage patient and family to use good hand hygiene technique  - Identify and instruct in appropriate isolation precautions for identified infection/condition  Outcome: Progressing  Goal: Absence of fever/infection during neutropenic period  Description: INTERVENTIONS:  - Monitor WBC    Outcome: Progressing     Problem: SAFETY ADULT  Goal: Patient will remain free of falls  Description: INTERVENTIONS:  - Educate patient/family on patient safety including physical limitations  - Instruct patient to call for assistance with activity   - Consult OT/PT to assist with strengthening/mobility   - Keep Call bell within reach  - Keep bed low and locked with side rails adjusted as appropriate  - Keep care items and personal belongings within reach  - Initiate and maintain comfort rounds  - Make Fall Risk Sign visible to staff  - Offer Toileting every 2 Hours,  in advance of need  - Initiate/Maintain bed/chair alarm  - Obtain necessary fall risk management equipment: yellow bracelet and yellow socks   - Apply yellow socks and bracelet for high fall risk patients  - Consider moving patient to room near nurses station  Outcome: Progressing  Goal: Maintain or return to baseline ADL function  Description: INTERVENTIONS:  -  Assess patient's ability to carry out ADLs; assess patient's baseline for ADL function and identify physical deficits which impact ability to perform ADLs (bathing, care of mouth/teeth, toileting, grooming, dressing, etc.)  - Assess/evaluate cause of self-care deficits   - Assess range of motion  - Assess patient's mobility; develop plan if impaired  - Assess patient's need for assistive devices and provide as appropriate  - Encourage maximum independence but intervene and supervise when necessary  - Involve family in performance of ADLs  - Assess for home care needs following discharge   - Consider OT consult to assist with ADL evaluation and planning for discharge  - Provide patient education as appropriate  Outcome: Progressing  Goal: Maintains/Returns to pre admission functional level  Description: INTERVENTIONS:  - Perform AM-PAC 6 Click Basic Mobility/ Daily Activity assessment daily.  - Set and communicate daily mobility goal to care team and patient/family/caregiver.   - Collaborate with rehabilitation services on mobility goals if consulted  - Perform Range of Motion 3 times a day.  - Reposition patient every 2 hours.  - Dangle patient 3 times a day  - Stand patient 3 times a day  - Ambulate patient 3 times a day  - Out of bed to chair 3 times a day   - Out of bed for meals 3 times a day  - Out of bed for toileting  - Record patient progress and toleration of activity level   Outcome: Progressing     Problem: DISCHARGE PLANNING  Goal: Discharge to home or other facility with appropriate resources  Description: INTERVENTIONS:  - Identify barriers to  discharge w/patient and caregiver  - Arrange for needed discharge resources and transportation as appropriate  - Identify discharge learning needs (meds, wound care, etc.)  - Arrange for interpretive services to assist at discharge as needed  - Refer to Case Management Department for coordinating discharge planning if the patient needs post-hospital services based on physician/advanced practitioner order or complex needs related to functional status, cognitive ability, or social support system  Outcome: Progressing     Problem: Knowledge Deficit  Goal: Patient/family/caregiver demonstrates understanding of disease process, treatment plan, medications, and discharge instructions  Description: Complete learning assessment and assess knowledge base.  Interventions:  - Provide teaching at level of understanding  - Provide teaching via preferred learning methods  Outcome: Progressing

## 2025-02-11 ENCOUNTER — TELEPHONE (OUTPATIENT)
Dept: NEUROSURGERY | Facility: CLINIC | Age: 65
End: 2025-02-11

## 2025-02-19 ENCOUNTER — CLINICAL SUPPORT (OUTPATIENT)
Dept: NEUROSURGERY | Facility: CLINIC | Age: 65
End: 2025-02-19

## 2025-02-19 ENCOUNTER — TELEPHONE (OUTPATIENT)
Dept: NEUROSURGERY | Facility: CLINIC | Age: 65
End: 2025-02-19

## 2025-02-19 VITALS
OXYGEN SATURATION: 98 % | SYSTOLIC BLOOD PRESSURE: 132 MMHG | TEMPERATURE: 97.3 F | HEART RATE: 44 BPM | DIASTOLIC BLOOD PRESSURE: 82 MMHG

## 2025-02-19 DIAGNOSIS — Z98.890 STATUS POST SURGERY: Primary | ICD-10-CM

## 2025-02-19 PROCEDURE — 99024 POSTOP FOLLOW-UP VISIT: CPT | Performed by: STUDENT IN AN ORGANIZED HEALTH CARE EDUCATION/TRAINING PROGRAM

## 2025-02-19 NOTE — TELEPHONE ENCOUNTER
I received a message from our nurses staff stating during patients 2 week post op appointment patient stated he received a letter that his pre admission testing studies were not covered by his insurance. He also stated all his stuff went through a [facility] claim. Asked if someone can review/take a look into and get back to him.    I called and left a detailed message making the patient aware his referral/approval states labs are covered and he should not be receiving such letter. I was advised by management to advise him to either reach out to or take the letter to the [facility] directly so that they may assist him further as he again should not be getting a bill for such labs. I also provided him with my direct line if he would need to call me back with any questions or concerns as well as to provided him with our billing departments phone number if needed.

## 2025-02-19 NOTE — PROGRESS NOTES
Post-Op Visit- Neurosurgery    Jordi Dias 64 y.o. male MRN: [id number]    Chief Complaint:  Patient presents post: L5-S1 transforaminal lumbar interbody fusion, possible additional levels, with neuromonitoring and neuronavigation    History of Present Illness:  Patient presents for 2 week POV for incision check accompanied by spouse and ambulating without an assistive device.  Patient reports he is doing well overall and denies any incisional issues or fevers.  He denies any new weakness, numbness or tingling since the surgery.  Patient has been compliant with taking the post-operative antibiotic as prescribed.  Patient admits to surgical pain at this time and rates their pain as a Pain Score:   3/10.  Patient is currently taking tylenol with some relief of pain symptoms.      Assessment:   Vitals:    02/19/25 1059   BP: 132/82   Pulse: (!) 44   Temp: (!) 97.3 °F (36.3 °C)   SpO2: 98%       Wound Exam: Incision well approximated.  No erythema, edema or drainage present.  Location:  Lumbar Spine    Procedure:  Suture removal.   Procedure Note: _ Incision cleansed with sterile NSS, sutures were removed. Cleansed area with NSS.  Patient Status: the patient tolerated the procedure well.  Complications: None.    Incision Open to Air     Discussion/Summary:  Doing well postoperatively. Reviewed incision care with patient including daily observation for s/s infection including: increased erythema, edema, drainage, dehiscence of incision or fever >101.  Should these be observed, he understands that he is to call and/or return immediately for reassessment.  Advised patient to continue cleansing area with mild soap and water and pat dry. Not to apply any lotions, creams, or ointments, & not to submerge in any water for 4 more weeks.     He is to maintain activity restrictions until cleared by the surgeon. Activity levels were also reviewed with the patient in detail, he is to lift no greater than 10 pounds, NO BLT's.   Ambulation is encouraged as tolerated.     Verified date/time/location of upcoming POV and reminded him to complete x-rays prior to his next appointment. He is to call the office with any further questions or concerns, or if any incisional issues or fevers would arise.

## 2025-03-17 ENCOUNTER — HOSPITAL ENCOUNTER (OUTPATIENT)
Dept: RADIOLOGY | Facility: HOSPITAL | Age: 65
Discharge: HOME | End: 2025-03-17
Payer: COMMERCIAL

## 2025-03-17 DIAGNOSIS — Z98.890 STATUS POST SURGERY: ICD-10-CM

## 2025-03-17 PROCEDURE — 72100 X-RAY EXAM L-S SPINE 2/3 VWS: CPT

## 2025-03-20 ENCOUNTER — OFFICE VISIT (OUTPATIENT)
Dept: NEUROSURGERY | Facility: CLINIC | Age: 65
End: 2025-03-20

## 2025-03-20 VITALS
HEIGHT: 72 IN | SYSTOLIC BLOOD PRESSURE: 128 MMHG | HEART RATE: 70 BPM | TEMPERATURE: 98.4 F | BODY MASS INDEX: 27.77 KG/M2 | OXYGEN SATURATION: 97 % | WEIGHT: 205 LBS | DIASTOLIC BLOOD PRESSURE: 72 MMHG

## 2025-03-20 DIAGNOSIS — Z98.890 STATUS POST SURGERY: ICD-10-CM

## 2025-03-20 DIAGNOSIS — Z98.1 S/P LUMBAR SPINAL FUSION: Primary | ICD-10-CM

## 2025-03-20 PROCEDURE — 99024 POSTOP FOLLOW-UP VISIT: CPT | Performed by: STUDENT IN AN ORGANIZED HEALTH CARE EDUCATION/TRAINING PROGRAM

## 2025-03-20 NOTE — PROGRESS NOTES
Name: Jordi Dias      : 1960      MRN: [id number]  Encounter Provider: Bunny Wilson MD  Encounter Date: 3/20/2025   [facility]  :  Assessment & Plan  Status post surgery    Orders:    X-ray lumbar spine 2 or 3 views; Future    S/P lumbar spinal fusion    Orders:    X-ray lumbar spine 2 or 3 views; Future    This is a 65-year-old male status post L5-S1 TLIF presenting for his 6-week postsurgical follow-up visit.  X-rays of his lumbar spine demonstrate hardware in place without any evidence of hardware failure.  The patient is doing very well and all of his preoperative symptoms have resolved.  He is very happy with the surgical results.  I will bring him back in 3 months for follow-up visit with x-rays of his lumbar spine.    History of Present Illness     Jordi Dias is a 65 y.o. male who presents for his 6-week postsurgical follow-up visit.    HPI   The patient states he is doing well.  Immediately after surgery, he reports complete cessation of the pain in his lower back and lower extremity.  He is back to a lot of his usual activities without any issues.  He does not have any complaints.  Review of Systems   Musculoskeletal:  Negative for back pain (center to right LBP down right buttock into hip and side of leg to foot), gait problem and myalgias.        6wk pov w/ repeat img: xr Lspine 3/17    Pre-op sx: center to right LBP pain into right buttock/hip/leg w/ right sided W/N  Post-op: symptoms have subsided, patient reports feeling chest congestion since beginning of March    Procedure(s):  24 Left L5-S1 far lateral MIS microdiscectomy- JULIÁN  25 - fusion x JA     Neurological:  Negative for weakness (right leg) and numbness (rigth leg & foot).     I have personally reviewed the MA's review of systems and made changes as necessary.    Past Medical History   Past Medical History:   Diagnosis Date    Anxiety     Panic attacks     Arthritis     Chronic pain disorder     Colon polyp     CPAP (continuous positive airway pressure) dependence     Hypercholesteremia     Hypertension     Kidney disease     Sleep apnea      Past Surgical History:   Procedure Laterality Date    COLONOSCOPY      EAR SURGERY Left     perforated ear drum    ELBOW SURGERY Left     ligament repair    EPIDURAL BLOCK INJECTION N/A 07/27/2023    Procedure: L5-S1 ILESI;  Surgeon: Melquiades Loving MD;  Location: [facility];  Service: Pain Management     EPIDURAL BLOCK INJECTION Left 08/22/2023    Procedure: LT L5 & S1 TFESI;  Surgeon: Melquiades Loving MD;  [facility]: [facility];  Service: Pain Management     EPIDURAL BLOCK INJECTION Left 11/07/2023    Procedure: LT L5 & S1 TFESI;  Surgeon: Melquiades Loving MD;  [facility]: [facility];  Service: Pain Management     IR SPINE AND PAIN PROCEDURE  03/12/2024    IR SPINE AND PAIN PROCEDURE  03/27/2024    IR SPINE AND PAIN PROCEDURE  7/9/2024    IR SPINE AND PAIN PROCEDURE  9/4/2024    NERVE BLOCK Bilateral 12/05/2023    Procedure: BLOCK MEDIAL BRANCH L3, L4, L5 #1;  Surgeon: Melquiades Loving MD;  Location: [facility];  Service: Pain Management     NERVE BLOCK Left 12/28/2023    Procedure: left L5 and S1 transforaminal LUIS;  Surgeon: Melquiades Loving MD;  [facility]: [facility];  Service: Pain Management     NERVE BLOCK Bilateral 02/21/2024    Procedure: B/L L3-L5 MBB #2;  Surgeon: Melquiades Loving MD;  [facility]: [facility];  Service: Pain Management     POSTERIOR LAMINECTOMY THORACIC AND LUMBAR SPINE Left 5/28/2024    Procedure: LEFT L5-S1 FAR LATERAL MICRODISCECTOMY;  Surgeon: James Hussein MD;  Location: [facility];  Service: Neurosurgery    DC ARTHRODESIS COMBINED TQ 1NTRSPC LUMBAR N/A 2/5/2025    Procedure: L5-S1 transforaminal lumbar interbody fusion, possible additional levels, with neuromonitoring and neuronavigation;  Surgeon: Bunny Wilson MD;  Location: [facility];  Service: Neurosurgery    REPLACEMENT TOTAL KNEE Bilateral   "    History reviewed. No pertinent family history.  he reports that he has never smoked. He has never used smokeless tobacco. He reports current alcohol use. He reports that he does not use drugs.  Current Outpatient Medications   Medication Instructions    acetaminophen (TYLENOL) 500 mg, Oral, Every 6 hours PRN    Cholecalciferol 25 MCG (1000 UT) tablet 1 tablet, Every other day    lidocaine (LIDODERM) 5 % 1 patch, Daily PRN    lisinopril (ZESTRIL) 10 mg, Every morning    Polyvinyl Alcohol-Povidone PF 1.4-0.6 % SOLN INSTILL 1 DROP OF REFRESH TEARS IN EACH EYE 5 TIMES A DAY AS NEEDED FOR CHRONIC DRY EYE/BLEPHARITIS    pravastatin (PRAVACHOL) 40 mg, Daily    sildenafil (VIAGRA) 50 mg    venlafaxine (EFFEXOR-XR) 37.5 mg, Daily at bedtime     Allergies   Allergen Reactions    Ibuprofen Other (See Comments)     Kidney issues      Objective   /72 (BP Location: Left arm, Patient Position: Sitting, Cuff Size: Adult)   Pulse 70   Temp 98.4 °F (36.9 °C) (Temporal)   Ht 5' 11.5\" (1.816 m)   Wt 93 kg (205 lb)   SpO2 97%   BMI 28.19 kg/m²     Physical Exam  Neurological Exam  General:  Normal, well developed, not in distress/pain     Skin:   No issues, no rashes noted     Musculoskeletal:   5/5 strength throughout all muscle groups  No tenderness to palpation of the spine       Neurologic Exam:  Awake and alert  Oriented x3  Speech clear and fluent  MCDOWELL   Sensation to light touch and pin prick intact throughout  No anders's  No clonus  2+ patellar reflexes     Gait:   normal gait, normal posture        Administrative Statements   I have spent a total time of 10 minutes in caring for this patient on the day of the visit/encounter including Diagnostic results, Prognosis, Instructions for management, Patient and family education, Impressions, Counseling / Coordination of care, Documenting in the medical record, Reviewing/placing orders in the medical record (including tests, medications, and/or procedures), and " Obtaining or reviewing history  .

## 2025-06-23 ENCOUNTER — OFFICE VISIT (OUTPATIENT)
Dept: NEUROSURGERY | Facility: CLINIC | Age: 65
End: 2025-06-23
Payer: COMMERCIAL

## 2025-06-23 VITALS
WEIGHT: 205 LBS | SYSTOLIC BLOOD PRESSURE: 130 MMHG | RESPIRATION RATE: 16 BRPM | DIASTOLIC BLOOD PRESSURE: 82 MMHG | HEART RATE: 70 BPM | HEIGHT: 71 IN | BODY MASS INDEX: 28.7 KG/M2 | TEMPERATURE: 97.6 F | OXYGEN SATURATION: 96 %

## 2025-06-23 DIAGNOSIS — Z98.890 STATUS POST SURGERY: ICD-10-CM

## 2025-06-23 DIAGNOSIS — Z98.1 S/P LUMBAR SPINAL FUSION: Primary | ICD-10-CM

## 2025-06-23 DIAGNOSIS — Z09 FOLLOW-UP EXAMINATION, FOLLOWING OTHER SURGERY: ICD-10-CM

## 2025-06-23 PROCEDURE — 99212 OFFICE O/P EST SF 10 MIN: CPT | Performed by: STUDENT IN AN ORGANIZED HEALTH CARE EDUCATION/TRAINING PROGRAM

## 2025-06-23 NOTE — PROGRESS NOTES
Name: Jordi Dias      : 1960      MRN: [id number]  Encounter Provider: Bunny Wilson MD  Encounter Date: 2025   Encounter [facility]  :  Assessment & Plan  Status post surgery    Orders:    X-ray lumbar spine 2 or 3 views; Future    S/P lumbar spinal fusion    Orders:    X-ray lumbar spine 2 or 3 views; Future    Follow-up examination, following other surgery    Orders:    X-ray lumbar spine 2 or 3 views; Future    This is a 65-year-old male status post L5-S1 TLIF presenting for his 4 month postsurgical follow-up visit. X-rays of his lumbar spine demonstrate hardware in place without any evidence of hardware failure. The patient is doing very well and all of his preoperative symptoms have resolved. He is very happy with the surgical results. I will bring him back in 6 months for follow-up visit with x-rays of his lumbar spine.     History of Present Illness     Jordi Dias is a 65 y.o. male who presents for his 4-month postop visit.    HPI   The patient states he is doing well.  Pain is no longer an issue for him.  He is back to all of his usual activities without any issues.  He does not have any complaints.    Review of Systems   Musculoskeletal:  Positive for myalgias (left calf).     I have personally reviewed the MA's review of systems and made changes as necessary.    Past Medical History   Past Medical History[1]  Past Surgical History[2]  Family History[3]  he reports that he has never smoked. He has never used smokeless tobacco. He reports current alcohol use. He reports that he does not use drugs.  Current Outpatient Medications   Medication Instructions    acetaminophen (TYLENOL) 500 mg, Oral, Every 6 hours PRN    Cholecalciferol 25 MCG (1000 UT) tablet 1 tablet, Every other day    lidocaine (LIDODERM) 5 % 1 patch, Daily PRN    lisinopril (ZESTRIL) 10 mg, Every morning    Polyvinyl Alcohol-Povidone PF 1.4-0.6 % SOLN INSTILL 1 DROP  "OF REFRESH TEARS IN EACH EYE 5 TIMES A DAY AS NEEDED FOR CHRONIC DRY EYE/BLEPHARITIS    pravastatin (PRAVACHOL) 40 mg, Daily    sildenafil (VIAGRA) 50 mg    venlafaxine (EFFEXOR-XR) 37.5 mg, Daily at bedtime   Allergies[4]   Objective   /82 (BP Location: Left arm, Patient Position: Sitting, Cuff Size: Large)   Pulse 70   Temp 97.6 °F (36.4 °C) (Temporal)   Resp 16   Ht 5' 11\" (1.803 m)   Wt 93 kg (205 lb)   SpO2 96%   BMI 28.59 kg/m²     Physical Exam  Neurological Exam  General:  Normal, well developed, not in distress/pain     Skin:   No issues, no rashes noted     Musculoskeletal:   5/5 strength throughout all muscle groups  No tenderness to palpation of the spine       Neurologic Exam:  Awake and alert  Oriented x3  Speech clear and fluent  MCD[place]LL   Sensation to light touch and pin prick intact throughout  No anders's  No clonus  2+ patellar reflexes     Gait:   normal gait, normal posture        Administrative Statements   I have spent a total time of 10 minutes in caring for this patient on the day of the visit/encounter including Diagnostic results, Prognosis, Instructions for management, Patient and family education, Impressions, Counseling / Coordination of care, Documenting in the medical record, Reviewing/placing orders in the medical record (including tests, medications, and/or procedures), and Obtaining or reviewing history  .           [1]   Past Medical History:  Diagnosis Date    Anxiety     Panic attacks    Arthritis     Chronic pain disorder     Colon polyp     CPAP (continuous positive airway pressure) dependence     Hypercholesteremia     Hypertension     Kidney disease     Sleep apnea    [2]   Past Surgical History:  Procedure Laterality Date    COLONOSCOPY      EAR SURGERY Left     perforated ear drum    ELBOW SURGERY Left     ligament repair    EPIDURAL BLOCK INJECTION N/A 07/27/2023    Procedure: L5-S1 ILESI;  Surgeon: Melquiades Loving MD;  Location:  [facility];  Service: Pain " Management     EPIDURAL BLOCK INJECTION Left 08/22/2023    Procedure: LT L5 & S1 TFESI;  Surgeon: Melquiades Loving MD;  Location: [facility];  Service: Pain Management     EPIDURAL BLOCK INJECTION Left 11/07/2023    Procedure: LT L5 & S1 TFESI;  Surgeon: Melquiades Loving MD;  [facility]: [facility];  Service: Pain Management     IR SPINE AND PAIN PROCEDURE  03/12/2024    IR SPINE AND PAIN PROCEDURE  03/27/2024    IR SPINE AND PAIN PROCEDURE  7/9/2024    IR SPINE AND PAIN PROCEDURE  9/4/2024    NERVE BLOCK Bilateral 12/05/2023    Procedure: BLOCK MEDIAL BRANCH L3, L4, L5 #1;  Surgeon: Melquiades Loving MD;  Location: [facility];  Service: Pain Management     NERVE BLOCK Left 12/28/2023    Procedure: left L5 and S1 transforaminal LUIS;  Surgeon: Melquiades Loving MD;  [facility]: [facility];  Service: Pain Management     NERVE BLOCK Bilateral 02/21/2024    Procedure: B/L L3-L5 MBB #2;  Surgeon: Melquiades Loving MD;  [facility]: [facility];  Service: Pain Management     POSTERIOR LAMINECTOMY THORACIC AND LUMBAR SPINE Left 5/28/2024    Procedure: LEFT L5-S1 FAR LATERAL MICRODISCECTOMY;  Surgeon: James Hussein MD;  Location: [facility];  Service: Neurosurgery    AK ARTHRODESIS COMBINED TQ [id number] LUMBAR N/A 2/5/2025    Procedure: L5-S1 transforaminal lumbar interbody fusion, possible additional levels, with neuromonitoring and neuronavigation;  Surgeon: Bunny Wilson MD;  Location: [facility];  Service: Neurosurgery    REPLACEMENT TOTAL KNEE Bilateral    [3] No family history on file.  [4]   Allergies  Allergen Reactions    Ibuprofen Other (See Comments)     Kidney issues

## 2025-08-14 ENCOUNTER — TELEPHONE (OUTPATIENT)
Age: 65
End: 2025-08-14

## 2025-08-14 ENCOUNTER — TELEPHONE (OUTPATIENT)
Dept: NEUROSURGERY | Facility: CLINIC | Age: 65
End: 2025-08-14

## (undated) DEVICE — GAUZE SPONGES,USP TYPE VII GAUZE, 12 PLY: Brand: CURITY

## (undated) DEVICE — TRAY EPID CONT PERIFIX 18G X 3.5IN 10ML CLSD TIP

## (undated) DEVICE — PENCIL ELECTROSURG E-Z CLEAN -0035H

## (undated) DEVICE — DRAPE TOWEL: Brand: CONVERTORS

## (undated) DEVICE — BOWL ASSY BM210 DUAL BLADE DISPOSABLE: Brand: MIDAS REX™

## (undated) DEVICE — UTILITY MARKER,BLACK WITH LABELS: Brand: DEVON

## (undated) DEVICE — NEEDLE 18 G X 1 1/2 SAFETY

## (undated) DEVICE — INTENDED FOR TISSUE SEPARATION, AND OTHER PROCEDURES THAT REQUIRE A SHARP SURGICAL BLADE TO PUNCTURE OR CUT.: Brand: BARD-PARKER SAFETY BLADES SIZE 10, STERILE

## (undated) DEVICE — TOOL MR8-SP14MH30T MR8 14CM SP MH 3F 3MM: Brand: MIDAS REX MR8

## (undated) DEVICE — SKIN MARKER DUAL TIP WITH RULER CAP, FLEXIBLE RULER AND LABELS: Brand: DEVON

## (undated) DEVICE — SYRINGE 10ML LL

## (undated) DEVICE — SYRINGE 5ML LL

## (undated) DEVICE — EXOFIN PRECISION PEN HIGH VISCOSITY TOPICAL SKIN ADHESIVE: Brand: EXOFIN PRECISION PEN, 1G

## (undated) DEVICE — ELECTRODE BLADE MOD  E-Z CLEAN 6.5IN -0014M

## (undated) DEVICE — DRAPE SHEET THREE QUARTER

## (undated) DEVICE — BETHLEHEM UNIVERSAL SPINE, KIT: Brand: CARDINAL HEALTH

## (undated) DEVICE — INTENDED FOR TISSUE SEPARATION, AND OTHER PROCEDURES THAT REQUIRE A SHARP SURGICAL BLADE TO PUNCTURE OR CUT.: Brand: BARD-PARKER ® CARBON RIB-BACK BLADES

## (undated) DEVICE — NEURO PATTIES 1/2 X 1 1/2

## (undated) DEVICE — FLEXIBLE ADHESIVE BANDAGE,X-LARGE: Brand: CURITY

## (undated) DEVICE — CHLORAPREP HI-LITE 10.5ML ORANGE

## (undated) DEVICE — SYRINGE LOR 8ML PLASTIC LL

## (undated) DEVICE — GLOVE SRG BIOGEL 7.5

## (undated) DEVICE — ADHESIVE SKIN HIGH VISCOSITY EXOFIN 1ML

## (undated) DEVICE — SUT STRATAFIX SPIRAL MONOCRYL PLUS 4-0 PS-2 45CM SXMP1B118

## (undated) DEVICE — STERILE LATEX POWDER-FREE SURGICAL GLOVESWITH NITRILE COATING: Brand: PROTEXIS

## (undated) DEVICE — NEEDLE 25G X 1 1/2

## (undated) DEVICE — ANTIBACTERIAL VIOLET BRAIDED (POLYGLACTIN 910), SYNTHETIC ABSORBABLE SUTURE: Brand: COATED VICRYL

## (undated) DEVICE — ELECTRODE BLADE MOD E-Z CLEAN 4IN -0014AM

## (undated) DEVICE — DISPOSABLE EQUIPMENT COVER: Brand: SMALL TOWEL DRAPE

## (undated) DEVICE — NEEDLE SPINAL 22G X 3.5IN  QUINCKE

## (undated) DEVICE — SNAP KOVER: Brand: UNBRANDED

## (undated) DEVICE — CHLORAPREP HI-LITE 26ML ORANGE

## (undated) DEVICE — HEMOSTATIC MATRIX SURGIFLO 8ML W/THROMBIN

## (undated) DEVICE — GLOVE SRG LF STRL BGL SKNSNS 7.5 PF

## (undated) DEVICE — IV EXTENSION TUBING SMALL BORE

## (undated) DEVICE — 3M™ IOBAN™ 2 ANTIMICROBIAL INCISE DRAPE 6650EZ: Brand: IOBAN™ 2

## (undated) DEVICE — Device

## (undated) DEVICE — NEURO PATTIES 1/2 X 3

## (undated) DEVICE — POV-IOD SOLUTION 4OZ BT

## (undated) DEVICE — SUT STRATAFIX SPIRAL PDS PLUS 1 CTX 18 IN SXPP1A400

## (undated) DEVICE — MARKER REFLECTIVE RADIOPAQUE SPHERE

## (undated) DEVICE — NEPTUNE E-SEP SMOKE EVACUATION PENCIL, COATED, 70MM BLADE, PUSH BUTTON SWITCH: Brand: NEPTUNE E-SEP

## (undated) DEVICE — PREMIUM DRY TRAY LF: Brand: MEDLINE INDUSTRIES, INC.

## (undated) DEVICE — GAUZE SPONGES,16 PLY: Brand: CURITY

## (undated) DEVICE — SUT PROLENE 2-0 FS 18 IN 8685H

## (undated) DEVICE — NEEDLE SPINAL18G X 3.5 IN QUINCKE

## (undated) DEVICE — PLASTIC ADHESIVE BANDAGE: Brand: CURITY

## (undated) DEVICE — DRESSING MEPILEX AG BORDER 4 X 4 IN

## (undated) DEVICE — JACKSON TABLE FOAM POSITIONING KIT: Brand: CARDINAL HEALTH

## (undated) DEVICE — LAPAROTOMY DRAPE WITH POUCHES: Brand: CONVERTORS

## (undated) DEVICE — DRAPE C-ARMOUR

## (undated) DEVICE — GLOVE INDICATOR PI UNDERGLOVE SZ 7.5 BLUE

## (undated) DEVICE — DRESSING MEPILEX AG BORDER POST-OP 4 X 6 IN

## (undated) DEVICE — JP 3-SPRING RES W/10FR PVC DRAIN/TR: Brand: CARDINAL HEALTH

## (undated) DEVICE — DRESSING MEPILEX AG BORDER POST-OP 4 X 8 IN

## (undated) DEVICE — DRAPE MICROSCOPE OPMI PENTERO

## (undated) DEVICE — TRAY FOLEY 16FR URIMETER SURESTEP

## (undated) DEVICE — TIBURON SPLIT SHEET: Brand: CONVERTORS

## (undated) DEVICE — SPONGE SCRUB 4 PCT CHLORHEXIDINE

## (undated) DEVICE — TOOL 14MH30 LEGEND 14CM 3MM: Brand: MIDAS REX ™